# Patient Record
Sex: FEMALE | Race: WHITE | NOT HISPANIC OR LATINO | Employment: OTHER | URBAN - METROPOLITAN AREA
[De-identification: names, ages, dates, MRNs, and addresses within clinical notes are randomized per-mention and may not be internally consistent; named-entity substitution may affect disease eponyms.]

---

## 2017-01-11 ENCOUNTER — PATIENT MESSAGE (OUTPATIENT)
Dept: MEDICAL GROUP | Facility: CLINIC | Age: 62
End: 2017-01-11

## 2017-01-12 ENCOUNTER — PATIENT MESSAGE (OUTPATIENT)
Dept: MEDICAL GROUP | Facility: CLINIC | Age: 62
End: 2017-01-12

## 2017-01-12 DIAGNOSIS — R79.89 ELEVATED TSH: ICD-10-CM

## 2017-01-12 DIAGNOSIS — E78.5 DYSLIPIDEMIA: ICD-10-CM

## 2017-01-12 RX ORDER — LEVOTHYROXINE SODIUM 0.05 MG/1
50 TABLET ORAL
Qty: 90 TAB | Refills: 3 | Status: SHIPPED | OUTPATIENT
Start: 2017-01-12 | End: 2018-03-05 | Stop reason: SDUPTHER

## 2017-01-12 RX ORDER — ATORVASTATIN CALCIUM 20 MG/1
20 TABLET, FILM COATED ORAL
Qty: 90 TAB | Refills: 3 | Status: SHIPPED | OUTPATIENT
Start: 2017-01-12 | End: 2017-12-18 | Stop reason: SDUPTHER

## 2017-01-12 NOTE — TELEPHONE ENCOUNTER
From: Gerri Castelan  To: Natacha Murillo M.D.  Sent: 1/11/2017 6:53 PM PST  Subject: Prescription Question    Hi. I sent a request for a prescription change and refill on October 4th, but haven't heard anything back yet. [facility] and [facility] are requesting that I order refills online instead of picking up at local pharmacies. I have plenty of Levothyroxine, but am running low on atorvastatin. Also, I need an authorization from Dr. Murillo on the atorvastatin.    Thank you.

## 2017-01-13 NOTE — TELEPHONE ENCOUNTER
From: Gerri Castelan  To: Natacha Murillo M.D.  Sent: 1/12/2017 4:50 PM PST  Subject: RE:prescription request    Hi. It is [facility]. They prefer that I use mail-order. Their phone number is [phone]. The contract ID is [id number]. RXBIN: [id number], RXPCN: NetCard, RXGrp: EMELINA.     Would you change my local pharmacy to [facility] [address] because [facility] pharmacy is now [facility] and [facility] uses [facility].     Yes, I need atorvastatin and levothyroxine. I'm buying an over the counter drug (Zantac) for the gerd.     Thanks so much. Both of our prescription plans changed this month.          Gerri  ----- Message -----  From: Natacha Murillo M.D.  Sent: 1/12/2017 8:48 AM PST  To: Gerri Castelan  Subject: prescription request    I did not receive that request. The last one I have for atorvastatin was back in September. I do see in October 14 renewal at [facility] locally. The only pharmacy I have on file for you is local. Wet pharmacy do you wish to use? And I gather I need to send in a prescription on both the atorvastatin and the levothyroxine. Do I also need to send in the ranitidine?

## 2017-01-19 ENCOUNTER — OFFICE VISIT (OUTPATIENT)
Dept: OTHER | Facility: IMAGING CENTER | Age: 62
End: 2017-01-19
Payer: COMMERCIAL

## 2017-01-19 DIAGNOSIS — G43.709 CHRONIC MIGRAINE WITHOUT AURA WITHOUT STATUS MIGRAINOSUS, NOT INTRACTABLE: ICD-10-CM

## 2017-01-19 PROCEDURE — 97813 ACUP 1/> W/ESTIM 1ST 15 MIN: CPT | Performed by: FAMILY MEDICINE

## 2017-01-19 PROCEDURE — 97811 ACUP 1/> W/O ESTIM EA ADD 15: CPT | Performed by: FAMILY MEDICINE

## 2017-01-19 PROCEDURE — 99213 OFFICE O/P EST LOW 20 MIN: CPT | Mod: 25 | Performed by: FAMILY MEDICINE

## 2017-01-19 NOTE — PROGRESS NOTES
[facility]upuncture Progress Note  [address] [facility] [address]  Dept: [phone]      Patient Name: Gerri Castelan   MRN: [id number]  YOB: 1955  PCP: Natacha Murillo M.D.  Date of Service: 1/19/2017  1:29 PM    CC Right shoulder pain, Loose BM with urgency   HPI Patient is a 59 yo  female with chronic loss stool after her GB removal.  She has been also having right shoulder tightness after her procedure of melanoma removal.  She would like her GERD treated as well.  Since last tx she has been feeling no right shoulder tightness.  Her left periorbital headache still occurs a few times with lesser intensity after about 3 weeks of no headache at all.  Her frontal sinus cruz shave some pressure since last night and got a little headache this morning.  Her hypothyroidism is stable but she is still mildly tired.   ROS Birthplace: Not asked.  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   • Dyslipidemia 10/2010   • Carcinoma of breast (CMS-HCC) 1/2011     diagnosed at 55, no chemo required due to oncotype   • gallbladder polyp 1/2011   • S/P bilateral mastectomy 3/2011   • Family history of early CAD 10/17/2016     Past Surgical History   Procedure Laterality Date   • Tubal ligation  1987   • Colonoscopy  2/2005     normal, due 2015   • Other orthopedic surgery  1997     knee surgery ACL repair   • Other  1983     malignant melanoma skin cancer upper back   • Lumpectomy  1/19/2011     Performed by HANNAH LEVIN at SURGERY [facility] DAY [facility] ORS   • Node biopsy sentinel  1/19/2011     Performed by [facility]   • Breast biopsy  2/24/2011     Performed by HANNAH LEVIN at SURGERY [facility] DAY [facility] ORS   • Mastectomy  3/29/2011     Performed by HANNAH LEVIN at SUR[facility]ERY [facility]   • Breast reconstruction  3/29/2011     Performed by PATEL DALTON at SURGERY [facility]   • Tissue expander place/remove  3/29/2011     Performed by PATEL DALTON at SURGERY [facility]   • Breast reconstruction  7/15/2011     Performed by PATEL DALTON at SURGERY [facility]   • Tissue expander place/remove  7/15/2011     Performed by PATEL DALTON at SURGERY [facility]   • Breast implant revision  7/15/2011     Performed by PATEL DALTON at SURGERY [facility]   • Capsulectomy  7/15/2011     Performed by PATEL DALTON at SURGERY [facility]   • Tissue transfer/rearrange  7/15/2011     Performed by PATEL DALTON at [facility]   • Nipple reconstruction  12/1/2011     Performed by PATEL DALTON at [facility]   • Flap graft  12/1/2011     Performed by PATEL DALTON at [facility]   • Tonya by laparoscopy  12/6/2012     Performed by Yessica Ocampo M.D. at SURGERY [facility]      SH Social History     Social History   • Marital Status:      Spouse Name: N/A   • Number of Children: N/A   • Years of Education: N/A     Social History Main Topics   • Smoking status: Never Smoker    • Smokeless tobacco: Never Used   • Alcohol Use: 0.6 oz/week     1 Standard drinks or equivalent per week      Comment: rarely   • Drug Use: No   • Sexual Activity:     Partners: Male     Other Topics Concern   • Not on file     Social History Narrative      MEDS Current Outpatient Prescriptions on File Prior to Visit   Medication Sig Dispense Refill   • levothyroxine (SYNTHROID) 50 MCG Tab Take 1 Tab by mouth every morning before breakfast. 90 Tab 3   • atorvastatin (LIPITOR) 20 MG Tab Take 1 Tab by mouth every bedtime. 90 Tab 3   • ranitidine (ZANTAC) 150 MG Tab Take 150 mg by mouth 2 times a day.     • aspirin EC (ECOTRIN) 81 MG Tablet Delayed Response Take 1 Tab by mouth every day. 30 Tab 0   • Probiotic Product  (PROBIOTIC DAILY PO) Take 1 Cap by mouth every day.       No current facility-administered medications on file prior to visit.      ALLERGIES Allergies   Allergen Reactions   • Arimidex [Anastrozole] Swelling      PE Marguerite Exam: Stomach Qi: (+) pecking radial pulse  (+) Oketsu, (+) Immune,   (L) Adrenal - B/LKid16 St9 DaiMai ASIS Kid2         Assessment Eastern Liver/blood stagnation, Stomach qi imbalance, Immune imbalance, Adrenal exhaustion.    Western Encounter Diagnoses   Name Primary?   • Chronic migraine without aura without status migrainosus, not intractable                   Plan Set 1: Left (Lv4, Lu5), B/L LI10-11 area  Set 2: R Kd3, 7, 9, 10, 27  Set 3: L (LI 4 --> LI 10 TW 5 --> TW 9, SI 7 --> SI 9, SP 6 +++> SP 9, LR 3.3 +++> LR8), R (BOSSMAN 7--> BOSSMAN 5, PC 5--> PC 9, ST 39 --> ST 36, GB 34, Ht 5)  Set 4: R Er pung 1 syd, L Er pung 2 syd, Upper feeling area 2/5     Chema Hopkins D.O.    >More than 15 minutes were spent discussing with the patient about her condition which did not include procedure time.

## 2017-01-19 NOTE — MR AVS SNAPSHOT
Gerri Castelan   2017 1:30 PM   Office Visit   MRN: [id number]    Department:  Acupuncture Med   Dept Phone:  [phone]    Description:  Female : 1955   Provider:  Chema Hopkins D.O.           Allergies as of 2017     Allergen Noted Reactions    Arimidex [Anastrozole] 2013   Swelling      You were diagnosed with     Chronic migraine without aura without status migrainosus, not intractable   [[id number]]         Vital Signs     Last Menstrual Period Smoking Status                10/30/2004 Never Smoker           Basic Information     Date Of Birth Sex Race Ethnicity Preferred Language    1955 Female White Non- English      Problem List              ICD-10-CM Priority Class Noted - Resolved    GERD (gastroesophageal reflux disease) K21.9   2009 - Present    Family history of breast cancer in mother Z80.3   2010 - Present    Personal history of breast cancer Z85.3   Unknown - Present    Melanoma (CMS-HCC) C43.9   2011 - Present    Chronic migraine without aura without status migrainosus, not intractable G43.709   Unknown - Present    Elevated TSH R94.6   3/22/2016 - Present    Chronic right shoulder pain M25.511, G89.29   2016 - Present    Frequent loose stools R19.7   2016 - Present    Dyslipidemia E78.5   2016 - Present    Hypothyroid E03.9   2016 - Present    History of TIA (transient ischemic attack) Z86.73   10/3/2016 - Present    Family history of early CAD Z82.49   10/17/2016 - Present    Abnormal echocardiogram R93.1   2016 - Present    Elevated fasting glucose R73.01   2016 - Present      Health Maintenance        Date Due Completion Dates    PAP SMEAR 3/22/2017 3/22/2016, 2015, 2013, 2012 (Prv Comp), 2012, 2011, 2010    Override on 2012: Previously completed    IMM DTaP/Tdap/Td Vaccine (2 - Td) 2021    COLONOSCOPY 2025            Current Immunizations     Influenza TIV (IM) 11/5/2013    Influenza Vaccine Quad Inj (Pf) 10/15/2015    Influenza Vaccine Quad Inj (Preserved) 10/20/2016    SHINGLES VACCINE 10/15/2015    Tdap Vaccine 8/2/2011      Below and/or attached are the medications your provider expects you to take. Review all of your home medications and newly ordered medications with your provider and/or pharmacist. Follow medication instructions as directed by your provider and/or pharmacist. Please keep your medication list with you and share with your provider. Update the information when medications are discontinued, doses are changed, or new medications (including over-the-counter products) are added; and carry medication information at all times in the event of emergency situations     Allergies:  ARIMIDEX - Swelling               Medications  Valid as of: January 19, 2017 -  2:35 PM    Generic Name Brand Name Tablet Size Instructions for use    Aspirin (Tablet Delayed Response) ECOTRIN 81 MG Take 1 Tab by mouth every day.        Atorvastatin Calcium (Tab) LIPITOR 20 MG Take 1 Tab by mouth every bedtime.        Levothyroxine Sodium (Tab) SYNTHROID 50 MCG Take 1 Tab by mouth every morning before breakfast.        Probiotic Product   Take 1 Cap by mouth every day.        RaNITidine HCl (Tab) ZANTAC 150 MG Take 150 mg by mouth 2 times a day.        .                 Medicines prescribed today were sent to:     [id number] - [place] - [address]    [address] [address]    Phone: [phone] Fax: [phone]    Open 24 Hours?: No    [facility] - [place], [place] - [address]    [address] [address]    Phone: [phone] Fax: [phone]    Open 24 Hours?: No    [id number] - [place] - [address] [facility] [address]    [address] [address]    Phone: [phone] Fax: [phone]    Open 24 Hours?: No      Medication refill instructions:        If your prescription bottle indicates you have medication refills left, it is not necessary to call your provider’s office. Please contact your pharmacy and they will refill your medication.    If your prescription bottle indicates you do not have any refills left, you may request refills at any time through one of the following ways: The online [employer] system (except Urgent Care), by calling your provider’s office, or by asking your pharmacy to contact your provider’s office with a refill request. Medication refills are processed only during regular business hours and may not be available until the next business day. Your provider may request additional information or to have a follow-up visit with you prior to refilling your medication.   *Please Note: Medication refills are assigned a new Rx number when refilled electronically. Your pharmacy may indicate that no refills were authorized even though a new prescription for the same medication is available at the pharmacy. Please request the medicine by name with the pharmacy before contacting your provider for a refill.        Instructions    Have encouraged the patient to rest after the acupuncture session today - taking naps or going to sleep early as necessary.  Increase intake of water and refrain from strenuous activities.  Patient may expect to feel transient worsening of symptoms, but this should resolve to benefit in the next day or two after treatment.    The side effects of Acupuncture needle insertion include: minor bruising, bleeding, or pain at the site of needle insertion.  If more worrisome symptoms, such as continued bleeding, severe bruising, or continue pain or altered sensation persist, please contact [facility] office @ [phone]            [employer] Access Code: Activation code not generated  Current [employer] Status: Active

## 2017-01-19 NOTE — PATIENT INSTRUCTIONS
Have encouraged the patient to rest after the acupuncture session today - taking naps or going to sleep early as necessary.  Increase intake of water and refrain from strenuous activities.  Patient may expect to feel transient worsening of symptoms, but this should resolve to benefit in the next day or two after treatment.    The side effects of Acupuncture needle insertion include: minor bruising, bleeding, or pain at the site of needle insertion.  If more worrisome symptoms, such as continued bleeding, severe bruising, or continue pain or altered sensation persist, please contact [facility] office @ [phone]

## 2017-02-14 ENCOUNTER — OFFICE VISIT (OUTPATIENT)
Dept: OTHER | Facility: IMAGING CENTER | Age: 62
End: 2017-02-14
Payer: COMMERCIAL

## 2017-02-14 DIAGNOSIS — G89.29 CHRONIC RIGHT SHOULDER PAIN: ICD-10-CM

## 2017-02-14 DIAGNOSIS — M25.511 CHRONIC RIGHT SHOULDER PAIN: ICD-10-CM

## 2017-02-14 DIAGNOSIS — G43.709 CHRONIC MIGRAINE WITHOUT AURA WITHOUT STATUS MIGRAINOSUS, NOT INTRACTABLE: ICD-10-CM

## 2017-02-14 PROCEDURE — 99213 OFFICE O/P EST LOW 20 MIN: CPT | Mod: 25 | Performed by: FAMILY MEDICINE

## 2017-02-14 PROCEDURE — 97813 ACUP 1/> W/ESTIM 1ST 15 MIN: CPT | Performed by: FAMILY MEDICINE

## 2017-02-14 PROCEDURE — 97811 ACUP 1/> W/O ESTIM EA ADD 15: CPT | Performed by: FAMILY MEDICINE

## 2017-02-14 NOTE — PATIENT INSTRUCTIONS
Have encouraged the patient to rest after the acupuncture session today - taking naps or going to sleep early as necessary.  Increase intake of water and refrain from strenuous activities.  Patient may expect to feel transient worsening of symptoms, but this should resolve to benefit in the next day or two after treatment.    The side effects of Acupuncture needle insertion include: minor bruising, bleeding, or pain at the site of needle insertion.  If more worrisome symptoms, such as continued bleeding, severe bruising, or continue pain or altered sensation persist, please contact [facility] office @ [phone]

## 2017-02-14 NOTE — PROGRESS NOTES
[facility] Acupuncture Progress Note  [address] [facility] [address]  Dept: [phone]      Patient Name: Gerri Castelan   MRN: [id number]  YOB: 1955  PCP: Natacha Murillo M.D.  Date of Service: 2/14/2017  1:59 PM    CC Right shoulder pain, Loose BM with urgency   HPI Patient is a 61 yo  female with chronic loss stool after her GB removal.  She has been also having right shoulder tightness after her procedure of melanoma removal.  She would like her GERD treated as well.  Since last tx she has been feeling no right shoulder tightness.  Her left periorbital headache still occurs a few times with lesser intensity about 3 weeks ago.  Her frontal sinus no pressure is presented.  Her hypothyroidism is stable but she is still mildly tired.   ROS Birthplace: Not asked.  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   • Dyslipidemia 10/2010   • Carcinoma of breast (CMS-HCC) 1/2011     diagnosed at 55, no chemo required due to oncotype   • gallbladder polyp 1/2011   • S/P bilateral mastectomy 3/2011   • Family history of early CAD 10/17/2016     Past Surgical History   Procedure Laterality Date   • Tubal ligation  1987   • Colonoscopy  2/2005     normal, due 2015   • Other orthopedic surgery  1997     knee surgery ACL repair   • Other  1983     malignant melanoma skin cancer upper back   • Lumpectomy  1/19/2011     Performed by HANNAH LEVIN at SURGERY SAME DAY [facility] ORS   • Node biopsy sentinel  1/19/2011     Performed by HANNAH LEVIN at SURGERY SAME DAY [facility] OR[facility]   • Breast biopsy  2/24/2011     Performed by HANNAH LEVIN at SURGERY SAME DAY [facility] ORS   • Mastectomy  3/29/2011     Performed by HANNAH LEVIN at SURGERY [facility]   • Breast reconstruction  3/29/2011     Performed by PATEL DALTON  SINA at SURGERY [facility]   • Tissue expander place/remove  3/29/2011     Performed by PATEL DALTON at SURGERY [facility]   • Breast reconstruction  7/15/2011     Performed by PATEL DALTON at SURGERY [facility]   • Tissue expander place/remove  7/15/2011     Performed by PATEL DALTON at [facility]   • Breast implant revision  7/15/2011     Performed by PATEL DALTON at [facility]   • Capsulectomy  7/15/2011     Performed by PATEL DALTON at [facility]   • Tissue transfer/rearrange  7/15/2011     Performed by PATEL DALTON at [facility]   • Nipple reconstruction  12/1/2011     Performed by PATEL DALTON at [facility]   • Flap graft  12/1/2011     Performed by PATEL DALTON at [facility]   • Tonya by laparoscopy  12/6/2012     Performed by Yessica Ocampo M.D. at SURGERY [facility]      SH Social History     Social History   • Marital Status:      Spouse Name: N/A   • Number of Children: N/A   • Years of Education: N/A     Social History Main Topics   • Smoking status: Never Smoker    • Smokeless tobacco: Never Used   • Alcohol Use: 0.6 oz/week     1 Standard drinks or equivalent per week      Comment: rarely   • Drug Use: No   • Sexual Activity:     Partners: Male     Other Topics Concern   • Not on file     Social History Narrative      MEDS Current Outpatient Prescriptions on File Prior to Visit   Medication Sig Dispense Refill   • levothyroxine (SYNTHROID) 50 MCG Tab Take 1 Tab by mouth every morning before breakfast. 90 Tab 3   • atorvastatin (LIPITOR) 20 MG Tab Take 1 Tab by mouth every bedtime. 90 Tab 3   • ranitidine (ZANTAC) 150 MG Tab Take 150 mg by mouth 2 times a day.     • aspirin EC (ECOTRIN) 81 MG Tablet Delayed Response Take 1 Tab by mouth every day. 30 Tab 0   • Probiotic Product (PROBIOTIC DAILY PO) Take 1 Cap by mouth every day.       No current  facility-administered medications on file prior to visit.      ALLERGIES Allergies   Allergen Reactions   • Arimidex [Anastrozole] Swelling      PE Marguerite Exam: Stomach Qi: (+) pecking radial pulse  (+) Oketsu, (+) Immune,   (L) Adrenal - B/LKid16 St9 DaiMai ASIS Kid2         Assessment Eastern Liver/blood stagnation, Stomach qi imbalance, Immune imbalance, Adrenal exhaustion.    Western Encounter Diagnoses   Name Primary?   • Chronic migraine without aura without status migrainosus, not intractable    • Chronic right shoulder pain                   Plan Set 1: Left (Lv4, Lu5), B/L LI10-11 area  Set 2: R Kd3, 7, 9, 10, 27  Set 3: L (LI 4 --> LI 10 TW 5, SI 7 --> SI 9, SP 6 +++> SP 9, LR 3.3 +++> LR8), R (BOSSMAN 7--> BOSSMAN 5, PC 5, ST 39 --> ST 36, GB 34, Ht 5)  Set 4: R Er pung 1 syd, L Er pung 2 syd, Upper feeling area 2/5   3 times more and then graduate    Chema Hopkins D.O.    >More than 15 minutes were spent discussing with the patient about her condition which did not include procedure time.

## 2017-02-14 NOTE — MR AVS SNAPSHOT
Gerri Castelan   2017 2:00 PM   Office Visit   MRN: [id number]    Department:  Acupuncture Med   Dept Phone:  [phone]    Description:  Female : 1955   Provider:  Chema Hopkins D.O.           Allergies as of 2017     Allergen Noted Reactions    Arimidex [Anastrozole] 2013   Swelling      You were diagnosed with     Chronic migraine without aura without status migrainosus, not intractable   [014955]       Chronic right shoulder pain   [914820]         Vital Signs     Last Menstrual Period Smoking Status                10/30/2004 Never Smoker           Basic Information     Date Of Birth Sex Race Ethnicity Preferred Language    1955 Female White Non- English      Your appointments     Mar 14, 2017  1:30 PM   ACUPUNCTURE 30 with [facility] (--)    [address]  [employer] [place] [address]   [phone]            2017  2:00 PM   [facility] (--)    [address]  [email or web address] [address]   [phone]              Problem List              ICD-10-CM Priority Class Noted - Resolved    GERD (gastroesophageal reflux disease) K21.9   2009 - Present    Family history of breast cancer in mother Z80.3   2010 - Present    Personal history of breast cancer Z85.3   Unknown - Present    Melanoma (CMS-HCC) C43.9   2011 - Present    Chronic migraine without aura without status migrainosus, not intractable G43.709   Unknown - Present    Elevated TSH R94.6   3/22/2016 - Present    Chronic right shoulder pain M25.511, G89.29   2016 - Present    Frequent loose stools R19.7   2016 - Present    Dyslipidemia E78.5   2016 - Present    Hypothyroid E03.9   2016 - Present    History of TIA (transient ischemic attack) Z86.73   10/3/2016 - Present    Family history of early CAD Z82.49   10/17/2016 -  Present    Abnormal echocardiogram R93.1   11/17/2016 - Present    Elevated fasting glucose R73.01   12/30/2016 - Present      Health Maintenance        Date Due Completion Dates    PAP SMEAR 3/22/2017 3/22/2016, 1/12/2015, 11/5/2013, 9/20/2012 (Prv Comp), 9/20/2012, 8/30/2011, 8/2/2010    Override on 9/20/2012: Previously completed    IMM DTaP/Tdap/Td Vaccine (2 - Td) 8/2/2021 8/2/2011    COLONOSCOPY 2/24/2025 2/24/2015            Current Immunizations     Influenza TIV (IM) 11/5/2013    Influenza Vaccine Quad Inj (Pf) 10/15/2015    Influenza Vaccine Quad Inj (Preserved) 10/20/2016    SHINGLES VACCINE 10/15/2015    Tdap Vaccine 8/2/2011      Below and/or attached are the medications your provider expects you to take. Review all of your home medications and newly ordered medications with your provider and/or pharmacist. Follow medication instructions as directed by your provider and/or pharmacist. Please keep your medication list with you and share with your provider. Update the information when medications are discontinued, doses are changed, or new medications (including over-the-counter products) are added; and carry medication information at all times in the event of emergency situations     Allergies:  ARIMIDEX - Swelling               Medications  Valid as of: February 14, 2017 -  2:53 PM    Generic Name Brand Name Tablet Size Instructions for use    Aspirin (Tablet Delayed Response) ECOTRIN 81 MG Take 1 Tab by mouth every day.        Atorvastatin Calcium (Tab) LIPITOR 20 MG Take 1 Tab by mouth every bedtime.        Levothyroxine Sodium (Tab) SYNTHROID 50 MCG Take 1 Tab by mouth every morning before breakfast.        Probiotic Product   Take 1 Cap by mouth every day.        RaNITidine HCl (Tab) ZANTAC 150 MG Take 150 mg by mouth 2 times a day.        .                 Medicines prescribed today were sent to:     [facility]/PHARMACY #[id number] - [place], [place] - [address]    [address] [place] [address]    Phone:  [phone] Fax: [phone]    Open 24 Hours?: No    [facility] - [place], [place] - [address]    [address] [facility] [address]    Phone: [phone] Fax: [phone]    Open 24 Hours?: No    [id number] - [place], [place] - [address] AT [facility] [address]    [address] [place] [address]    Phone: [phone] Fax: [phone]    Open 24 Hours?: No      Medication refill instructions:       If your prescription bottle indicates you have medication refills left, it is not necessary to call your provider’s office. Please contact your pharmacy and they will refill your medication.    If your prescription bottle indicates you do not have any refills left, you may request refills at any time through one of the following ways: The online [employer] system (except Urgent Care), by calling your provider’s office, or by asking your pharmacy to contact your provider’s office with a refill request. Medication refills are processed only during regular business hours and may not be available until the next business day. Your provider may request additional information or to have a follow-up visit with you prior to refilling your medication.   *Please Note: Medication refills are assigned a new Rx number when refilled electronically. Your pharmacy may indicate that no refills were authorized even though a new prescription for the same medication is available at the pharmacy. Please request the medicine by name with the pharmacy before contacting your provider for a refill.        Instructions    Have encouraged the patient to rest after the acupuncture session today - taking naps or going to sleep early as necessary.  Increase intake of water and refrain from strenuous activities.  Patient may expect to feel transient worsening of symptoms, but this should resolve to benefit in the next day or two after treatment.    The side effects of Acupuncture  needle insertion include: minor bruising, bleeding, or pain at the site of needle insertion.  If more worrisome symptoms, such as continued bleeding, severe bruising, or continue pain or altered sensation persist, please contact [facility] office @ [phone]            [employer] Access Code: Activation code not generated  Current [employer] Status: Active

## 2017-03-14 ENCOUNTER — OFFICE VISIT (OUTPATIENT)
Dept: OTHER | Facility: IMAGING CENTER | Age: 62
End: 2017-03-14
Payer: COMMERCIAL

## 2017-03-14 DIAGNOSIS — G43.709 CHRONIC MIGRAINE WITHOUT AURA WITHOUT STATUS MIGRAINOSUS, NOT INTRACTABLE: ICD-10-CM

## 2017-03-14 DIAGNOSIS — G89.29 CHRONIC RIGHT SHOULDER PAIN: ICD-10-CM

## 2017-03-14 DIAGNOSIS — M25.511 CHRONIC RIGHT SHOULDER PAIN: ICD-10-CM

## 2017-03-14 PROCEDURE — 99213 OFFICE O/P EST LOW 20 MIN: CPT | Mod: 25 | Performed by: FAMILY MEDICINE

## 2017-03-14 PROCEDURE — 97811 ACUP 1/> W/O ESTIM EA ADD 15: CPT | Performed by: FAMILY MEDICINE

## 2017-03-14 PROCEDURE — 97813 ACUP 1/> W/ESTIM 1ST 15 MIN: CPT | Performed by: FAMILY MEDICINE

## 2017-03-14 NOTE — MR AVS SNAPSHOT
Gerri Castelan   3/14/2017 1:30 PM   Office Visit   MRN: [id number]    Department:  Acupuncture Med   Dept Phone:  [phone]    Description:  Female : 1955   Provider:  Chema Hopkins D.O.           Allergies as of 3/14/2017     Allergen Noted Reactions    Arimidex [Anastrozole] 2013   Swelling      You were diagnosed with     Chronic migraine without aura without status migrainosus, not intractable   [[id number]]         Vital Signs     Last Menstrual Period Smoking Status                10/30/2004 Never Smoker           Basic Information     Date Of Birth Sex Race Ethnicity Preferred Language    1955 Female White Non- English      Your appointments     2017  2:00 PM   ACUPUNCTURE 30 with [facility] (--)    [address]  [place] [address]   [phone]            May 09, 2017  2:00 PM   ACUPUNCTURE 30 with [facility] (--)    [address]  [place] [address]   [phone]              Problem List              ICD-10-CM Priority Class Noted - Resolved    GERD (gastroesophageal reflux disease) K21.9   2009 - Present    Family history of breast cancer in mother Z80.3   2010 - Present    Personal history of breast cancer Z85.3   Unknown - Present    Melanoma (CMS-HCC) C43.9   2011 - Present    Chronic migraine without aura without status migrainosus, not intractable G43.709   Unknown - Present    Elevated TSH R94.6   3/22/2016 - Present    Chronic right shoulder pain M25.511, G89.29   2016 - Present    Frequent loose stools R19.7   2016 - Present    Dyslipidemia E78.5   2016 - Present    Hypothyroid E03.9   2016 - Present    History of TIA (transient ischemic attack) Z86.73   10/3/2016 - Present    Family history of early CAD Z82.49   10/17/2016 - Present    Abnormal echocardiogram R93.1    11/17/2016 - Present    Elevated fasting glucose R73.01   12/30/2016 - Present      Health Maintenance        Date Due Completion Dates    PAP SMEAR 3/22/2017 3/22/2016, 1/12/2015, 11/5/2013, 9/20/2012 (Prv Comp), 9/20/2012, 8/30/2011, 8/2/2010    Override on 9/20/2012: Previously completed    IMM DTaP/Tdap/Td Vaccine (2 - Td) 8/2/2021 8/2/2011    COLONOSCOPY 2/24/2025 2/24/2015            Current Immunizations     Influenza TIV (IM) 11/5/2013    Influenza Vaccine Quad Inj (Pf) 10/15/2015    Influenza Vaccine Quad Inj (Preserved) 10/20/2016    SHINGLES VACCINE 10/15/2015    Tdap Vaccine 8/2/2011      Below and/or attached are the medications your provider expects you to take. Review all of your home medications and newly ordered medications with your provider and/or pharmacist. Follow medication instructions as directed by your provider and/or pharmacist. Please keep your medication list with you and share with your provider. Update the information when medications are discontinued, doses are changed, or new medications (including over-the-counter products) are added; and carry medication information at all times in the event of emergency situations     Allergies:  ARIMIDEX - Swelling               Medications  Valid as of: March 14, 2017 -  2:52 PM    Generic Name Brand Name Tablet Size Instructions for use    Aspirin (Tablet Delayed Response) ECOTRIN 81 MG Take 1 Tab by mouth every day.        Atorvastatin Calcium (Tab) LIPITOR 20 MG Take 1 Tab by mouth every bedtime.        Levothyroxine Sodium (Tab) SYNTHROID 50 MCG Take 1 Tab by mouth every morning before breakfast.        Probiotic Product   Take 1 Cap by mouth every day.        RaNITidine HCl (Tab) ZANTAC 150 MG Take 150 mg by mouth 2 times a day.        .                 Medicines prescribed today were sent to:     [facility]/PHARMACY #[id number] - [place] - [address]    [address] [place] [address]    Phone: [phone] Fax: [phone]    Open 24 Hours?:  No    [facility] - [place], [place] - [address]    [address] [facility] [address] [facility] [address]    Phone: [phone] Fax: [phone]    Open 24 Hours?: No    [facility] - [place], [place] - [address] [facility] [address] [facility] [address]    [address] [place] [address]    Phone: [phone] Fax: [phone]    Open 24 Hours?: No      Medication refill instructions:       If your prescription bottle indicates you have medication refills left, it is not necessary to call your provider’s office. Please contact your pharmacy and they will refill your medication.    If your prescription bottle indicates you do not have any refills left, you may request refills at any time through one of the following ways: The online [employer] system (except Urgent Care), by calling your provider’s office, or by asking your pharmacy to contact your provider’s office with a refill request. Medication refills are processed only during regular business hours and may not be available until the next business day. Your provider may request additional information or to have a follow-up visit with you prior to refilling your medication.   *Please Note: Medication refills are assigned a new Rx number when refilled electronically. Your pharmacy may indicate that no refills were authorized even though a new prescription for the same medication is available at the pharmacy. Please request the medicine by name with the pharmacy before contacting your provider for a refill.           [employer] Access Code: Activation code not generated  Current [employer] Status: Active

## 2017-03-14 NOTE — PROGRESS NOTES
[facility] Acupuncture Progress Note  [address] [facility] [address] [address]  Dept: [phone]      Patient Name: Gerri Castelan   MRN: [id number]  YOB: 1955  PCP: Natacha Murillo M.D.  Date of Service: 3/14/2017  1:46 PM    CC Right shoulder pain, Loose BM with urgency   HPI Patient is a 59 yo  female with chronic loss stool after her GB removal.  She has been also having right shoulder tightness after her procedure of melanoma removal.  She would like her GERD treated as well.  Since last tx she has been feeling no right shoulder tightness.  Her left periorbital headache still occurs a few times with lesser intensity about 2 weeks ago.  Her frontal sinus has a lot of pressure at this time.  Her hypothyroidism is stable but she is still mildly tired.   ROS Birthplace: Not asked.  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   • Dyslipidemia 10/2010   • Carcinoma of breast (CMS-HCC) 1/2011     diagnosed at 55, no chemo required due to oncotype   • gallbladder polyp 1/2011   • S/P bilateral mastectomy 3/2011   • Family history of early CAD 10/17/2016     Past Surgical History   Procedure Laterality Date   • Tubal ligation  1987   • Colonoscopy  2/2005     normal, due 2015   • Other orthopedic surgery  1997     knee surgery ACL repair   • Other  1983     malignant melanoma skin cancer upper back   • Lumpectomy  1/19/2011     Performed by HANNAH LEVIN at SURGERY SAME DAY [facility]   • Node biopsy sentinel  1/19/2011     Performed by HANNAH LEVIN at SURGERY SAME DAY [facility]   • Breast biopsy  2/24/2011     Performed by HANNAH LEVIN at SURGERY SAME DAY [facility]   • Mastectomy  3/29/2011     Performed by HANNAH LEVIN at SURGERY [facility] ORS   • Breast reconstruction  3/29/2011     Performed by  PATEL DALTON at SURGERY [facility]   • Tissue expander place/remove  3/29/2011     Performed by PATEL DALTON at SURGERY [facility]   • Breast reconstruction  7/15/2011     Performed by PATEL DALTON at [facility]   • Tissue expander place/remove  7/15/2011     Performed by PATEL DALTON at [facility]   • Breast implant revision  7/15/2011     Performed by PATEL DALTON at [facility]   • Capsulectomy  7/15/2011     Performed by PATEL DALTON at [facility]   • Tissue transfer/rearrange  7/15/2011     Performed by PATEL DALTON at [facility]   • Nipple reconstruction  12/1/2011     Performed by PATEL DALTON at [facility]   • Flap graft  12/1/2011     Performed by PATEL DALTON at [facility]   • Tonya by laparoscopy  12/6/2012     Performed by Yessica Ocampo M.D. at SURGERY [facility]      SH Social History     Social History   • Marital Status:      Spouse Name: N/A   • Number of Children: N/A   • Years of Education: N/A     Social History Main Topics   • Smoking status: Never Smoker    • Smokeless tobacco: Never Used   • Alcohol Use: 0.6 oz/week     1 Standard drinks or equivalent per week      Comment: rarely   • Drug Use: No   • Sexual Activity:     Partners: Male     Other Topics Concern   • Not on file     Social History Narrative      MEDS Current Outpatient Prescriptions on File Prior to Visit   Medication Sig Dispense Refill   • levothyroxine (SYNTHROID) 50 MCG Tab Take 1 Tab by mouth every morning before breakfast. 90 Tab 3   • atorvastatin (LIPITOR) 20 MG Tab Take 1 Tab by mouth every bedtime. 90 Tab 3   • ranitidine (ZANTAC) 150 MG Tab Take 150 mg by mouth 2 times a day.     • aspirin EC (ECOTRIN) 81 MG Tablet Delayed Response Take 1 Tab by mouth every day. 30 Tab 0   • Probiotic Product (PROBIOTIC DAILY PO) Take 1 Cap by mouth every day.       No  current facility-administered medications on file prior to visit.      ALLERGIES Allergies   Allergen Reactions   • Arimidex [Anastrozole] Swelling      PE Marguerite Exam: Stomach Qi: (+) pecking radial pulse  (+) Oketsu, (+) Immune,   (L) Adrenal - B/LKid16 St9 DaiMai ASIS Kid2         Assessment Eastern Liver/blood stagnation, Stomach qi imbalance, Immune imbalance, Adrenal exhaustion.    Western Encounter Diagnoses   Name Primary?   • Chronic migraine without aura without status migrainosus, not intractable    • Chronic right shoulder pain                   Plan Set 1: Left (Lv4, Lu5), B/L LI10-11 area  Set 2: R Kd3, 7, 9, 10, 27  Set 3: L (LI 4 --> LI 10 TW 5, SI 7 --> SI 9, SP 6 +++> SP 9, LR 3.3 +++> LR8), R (BOSSMAN 7--> BOSSMAN 5, PC 5, ST 39 --> ST 36, GB 34, Ht 5)  Set 4: R Er pung 1 syd, L Er pung 2 syd, Upper feeling area 2/5   2 times more and then graduate    Chema Hopkins D.O.    >More than 15 minutes were spent discussing with the patient about her condition which did not include procedure time.

## 2017-03-14 NOTE — PATIENT INSTRUCTIONS
Have encouraged the patient to rest after the acupuncture session today - taking naps or going to sleep early as necessary.  Increase intake of water and refrain from strenuous activities.  Patient may expect to feel transient worsening of symptoms, but this should resolve to benefit in the next day or two after treatment.    The side effects of Acupuncture needle insertion include: minor bruising, bleeding, or pain at the site of needle insertion.  If more worrisome symptoms, such as continued bleeding, severe bruising, or continue pain or altered sensation persist, please contact [facility] office @ [phone]

## 2017-04-03 ENCOUNTER — PATIENT MESSAGE (OUTPATIENT)
Dept: MEDICAL GROUP | Facility: CLINIC | Age: 62
End: 2017-04-03

## 2017-04-05 ENCOUNTER — HOSPITAL ENCOUNTER (OUTPATIENT)
Dept: LAB | Facility: MEDICAL CENTER | Age: 62
End: 2017-04-05
Attending: FAMILY MEDICINE
Payer: COMMERCIAL

## 2017-04-05 DIAGNOSIS — R73.01 ELEVATED FASTING GLUCOSE: ICD-10-CM

## 2017-04-05 LAB
ANION GAP SERPL CALC-SCNC: 8 MMOL/L (ref 0–11.9)
BUN SERPL-MCNC: 21 MG/DL (ref 8–22)
CALCIUM SERPL-MCNC: 9.4 MG/DL (ref 8.5–10.5)
CHLORIDE SERPL-SCNC: 108 MMOL/L (ref 96–112)
CO2 SERPL-SCNC: 23 MMOL/L (ref 20–33)
CREAT SERPL-MCNC: 0.76 MG/DL (ref 0.5–1.4)
EST. AVERAGE GLUCOSE BLD GHB EST-MCNC: 111 MG/DL
GFR SERPL CREATININE-BSD FRML MDRD: >60 ML/MIN/1.73 M 2
GLUCOSE SERPL-MCNC: 98 MG/DL (ref 65–99)
HBA1C MFR BLD: 5.5 % (ref 0–5.6)
POTASSIUM SERPL-SCNC: 4 MMOL/L (ref 3.6–5.5)
SODIUM SERPL-SCNC: 139 MMOL/L (ref 135–145)

## 2017-04-05 PROCEDURE — 36415 COLL VENOUS BLD VENIPUNCTURE: CPT

## 2017-04-05 PROCEDURE — 80048 BASIC METABOLIC PNL TOTAL CA: CPT

## 2017-04-05 PROCEDURE — 83036 HEMOGLOBIN GLYCOSYLATED A1C: CPT

## 2017-04-07 ENCOUNTER — TELEPHONE (OUTPATIENT)
Dept: MEDICAL GROUP | Facility: CLINIC | Age: 62
End: 2017-04-07

## 2017-04-07 NOTE — TELEPHONE ENCOUNTER
----- Message from Natacha Murillo M.D. sent at 4/6/2017  6:26 PM PDT -----  Your blood salts, blood sugar and kidney function are normal.. Your blood sugar control is normal.

## 2017-04-11 ENCOUNTER — OFFICE VISIT (OUTPATIENT)
Dept: OTHER | Facility: IMAGING CENTER | Age: 62
End: 2017-04-11
Payer: COMMERCIAL

## 2017-04-11 DIAGNOSIS — M25.511 CHRONIC RIGHT SHOULDER PAIN: ICD-10-CM

## 2017-04-11 DIAGNOSIS — G89.29 CHRONIC RIGHT SHOULDER PAIN: ICD-10-CM

## 2017-04-11 DIAGNOSIS — G43.709 CHRONIC MIGRAINE WITHOUT AURA WITHOUT STATUS MIGRAINOSUS, NOT INTRACTABLE: ICD-10-CM

## 2017-04-11 PROCEDURE — 97813 ACUP 1/> W/ESTIM 1ST 15 MIN: CPT | Performed by: FAMILY MEDICINE

## 2017-04-11 PROCEDURE — 99213 OFFICE O/P EST LOW 20 MIN: CPT | Mod: 25 | Performed by: FAMILY MEDICINE

## 2017-04-11 PROCEDURE — 97811 ACUP 1/> W/O ESTIM EA ADD 15: CPT | Performed by: FAMILY MEDICINE

## 2017-04-11 NOTE — PATIENT INSTRUCTIONS
Have encouraged the patient to rest after the acupuncture session today - taking naps or going to sleep early as necessary.  Increase intake of water and refrain from strenuous activities.  Patient may expect to feel transient worsening of symptoms, but this should resolve to benefit in the next day or two after treatment.    The side effects of Acupuncture needle insertion include: minor bruising, bleeding, or pain at the site of needle insertion.  If more worrisome symptoms, such as continued bleeding, severe bruising, or continue pain or altered sensation persist, please contact [facility] office @ [phone]

## 2017-04-11 NOTE — MR AVS SNAPSHOT
Gerri Castelan   2017 2:00 PM   Office Visit   MRN: [id number]    Department:  Acupuncture Med   Dept Phone:  [phone]    Description:  Female : 1955   Provider:  Chema [place] D.O.           Allergies as of 2017     Allergen Noted Reactions    Arimidex [Anastrozole] 2013   Swelling      You were diagnosed with     Chronic migraine without aura without status migrainosus, not intractable   [544789]       Chronic right shoulder pain   [466300]         Vital Signs     Last Menstrual Period Smoking Status                10/30/2004 Never Smoker           Basic Information     Date Of Birth Sex Race Ethnicity Preferred Language    1955 Female White Non- English      Your appointments     2017 11:00 AM   ANNUAL EXAM PREVENTATIVE with Natacha Murillo M.D.   [address]  [place] [address]   [phone]            May 09, 2017  2:00 PM   [facility] (--[facility]    [address]  [place] [address]   [phone]            2017  2:00 PM   ACUPUNCTURE 30 with Chema [place] D.O.   [facility]uncture [facility] (--)    [address]  [place] [address]   [phone]              Problem List              ICD-10-CM Priority Class Noted - Resolved    GERD (gastroesophageal reflux disease) K21.9   2009 - Present    Family history of breast cancer in mother Z80.3   2010 - Present    Personal history of breast cancer Z85.3   Unknown - Present    Melanoma (CMS-HCC) C43.9   2011 - Present    Chronic migraine without aura without status migrainosus, not intractable G43.709   Unknown - Present    Elevated TSH R94.6   3/22/2016 - Present    Chronic right shoulder pain M25.511, G89.29   2016 - Present    Frequent loose stools R19.7   2016 - Present    Dyslipidemia E78.5   2016  - Present    Hypothyroid E03.9   9/26/2016 - Present    History of TIA (transient ischemic attack) Z86.73   10/3/2016 - Present    Family history of early CAD Z82.49   10/17/2016 - Present    Abnormal echocardiogram R93.1   11/17/2016 - Present    Elevated fasting glucose R73.01   12/30/2016 - Present      Health Maintenance        Date Due Completion Dates    PAP SMEAR 3/22/2017 3/22/2016, 1/12/2015, 11/5/2013, 9/20/2012 (Prv Comp), 9/20/2012, 8/30/2011, 8/2/2010    Override on 9/20/2012: Previously completed    IMM DTaP/Tdap/Td Vaccine (2 - Td) 8/2/2021 8/2/2011    COLONOSCOPY 2/24/2025 2/24/2015            Current Immunizations     Influenza TIV (IM) 11/5/2013    Influenza Vaccine Quad Inj (Pf) 10/15/2015    Influenza Vaccine Quad Inj (Preserved) 10/20/2016    SHINGLES VACCINE 10/15/2015    Tdap Vaccine 8/2/2011      Below and/or attached are the medications your provider expects you to take. Review all of your home medications and newly ordered medications with your provider and/or pharmacist. Follow medication instructions as directed by your provider and/or pharmacist. Please keep your medication list with you and share with your provider. Update the information when medications are discontinued, doses are changed, or new medications (including over-the-counter products) are added; and carry medication information at all times in the event of emergency situations     Allergies:  ARIMIDEX - Swelling               Medications  Valid as of: April 11, 2017 -  3:10 PM    Generic Name Brand Name Tablet Size Instructions for use    Aspirin (Tablet Delayed Response) ECOTRIN 81 MG Take 1 Tab by mouth every day.        Atorvastatin Calcium (Tab) LIPITOR 20 MG Take 1 Tab by mouth every bedtime.        Levothyroxine Sodium (Tab) SYNTHROID 50 MCG Take 1 Tab by mouth every morning before breakfast.        Probiotic Product   Take 1 Cap by mouth every day.        RaNITidine HCl (Tab) ZANTAC 150 MG Take 150 mg by mouth 2  times a day.        .                 Medicines prescribed today were sent to:     [id number] - [place], [place] - [address]    [address] [place] [address]    Phone: [phone] Fax: [phone]    Open 24 Hours?: No    [facility] - [place], [place] - [address]    [address] [facility] [address]    Phone: [phone] Fax: [phone]    Open 24 Hours?: No    [id number] - [place], [place] - [address] [facility] [address]    [address] [place] [address]    Phone: [phone] Fax: [phone]    Open 24 Hours?: No      Medication refill instructions:       If your prescription bottle indicates you have medication refills left, it is not necessary to call your provider’s office. Please contact your pharmacy and they will refill your medication.    If your prescription bottle indicates you do not have any refills left, you may request refills at any time through one of the following ways: The online [employer] system (except Urgent Care), by calling your provider’s office, or by asking your pharmacy to contact your provider’s office with a refill request. Medication refills are processed only during regular business hours and may not be available until the next business day. Your provider may request additional information or to have a follow-up visit with you prior to refilling your medication.   *Please Note: Medication refills are assigned a new Rx number when refilled electronically. Your pharmacy may indicate that no refills were authorized even though a new prescription for the same medication is available at the pharmacy. Please request the medicine by name with the pharmacy before contacting your provider for a refill.        Instructions    Have encouraged the patient to rest after the acupuncture session today - taking naps or going to sleep early as necessary.  Increase intake of water and refrain from strenuous  activities.  Patient may expect to feel transient worsening of symptoms, but this should resolve to benefit in the next day or two after treatment.    The side effects of Acupuncture needle insertion include: minor bruising, bleeding, or pain at the site of needle insertion.  If more worrisome symptoms, such as continued bleeding, severe bruising, or continue pain or altered sensation persist, please contact [facility] office @ [phone]            [email or web address] Access Code: Activation code not generated  Current [email or web address] Status: Active

## 2017-04-11 NOTE — PROGRESS NOTES
[facility] Acupuncture Progress Note  [address] [facility] [address]  Dept: [phone]      Patient Name: Gerri Castelan   MRN: [id number]  YOB: 1955  PCP: Natacha Murillo M.D.  Date of Service: 4/11/2017  2:12 PM    CC Right shoulder pain, Loose BM with urgency   HPI Patient is a 59 yo  female with chronic loss stool after her GB removal.  She has been also having right shoulder tightness after her procedure of melanoma removal.  She would like her GERD treated as well.  Since last tx she has been feeling mild right shoulder tightness.  Her left periorbital headache still occurs a few times with lesser intensity a few days ago but not before that.  Her frontal sinus has lessor pressure compared to the last time.  Her hypothyroidism is stable but she is still mildly tired.   ROS Birthplace: Not asked.  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   • Dyslipidemia 10/2010   • Carcinoma of breast (CMS-HCC) 1/2011     diagnosed at 55, no chemo required due to oncotype   • gallbladder polyp 1/2011   • S/P bilateral mastectomy 3/2011   • Family history of early CAD 10/17/2016     Past Surgical History   Procedure Laterality Date   • Tubal ligation  1987   • Colonoscopy  2/2005     normal, due 2015   • Other orthopedic surgery  1997     knee surgery ACL repair   • Other  1983     malignant melanoma skin cancer upper back   • Lumpectomy  1/19/2011     Performed by HANNAH LEVIN at SURGERY SAME DAY [facility] ORS   • Node biopsy sentinel  1/19/2011     Performed by HANNAH LEVIN at SURGERY SAME DAY [facility] ORS   • Breast biopsy  2/24/2011     Performed by HANNAH LEVIN at SURGERY SAME DAY [facility] ORS   • Mastectomy  3/29/2011     Performed by HANNAH LEVIN at SURGERY [facility] ORS   • Breast reconstruction   3/29/2011     Performed by PATEL DALTON at SURGERY [facility]   • Tissue expander place/remove  3/29/2011     Performed by PATEL DALTON at SURGERY [facility]   • Breast reconstruction  7/15/2011     Performed by PATEL DALTON at [facility]   • Tissue expander place/remove  7/15/2011     Performed by PATEL DALTON at [facility]   • Breast implant revision  7/15/2011     Performed by PATEL DALTON at [facility]   • Capsulectomy  7/15/2011     Performed by PATEL DALTON at [facility]   • Tissue transfer/rearrange  7/15/2011     Performed by PATEL DALTON at [facility]   • Nipple reconstruction  12/1/2011     Performed by PATEL DALTON at [facility]   • Flap graft  12/1/2011     Performed by PATEL DALTON at [facility]   • Tonya by laparoscopy  12/6/2012     Performed by Yessica Ocampo M.D. at SURGERY [facility]      SH Social History     Social History   • Marital Status:      Spouse Name: N/A   • Number of Children: N/A   • Years of Education: N/A     Social History Main Topics   • Smoking status: Never Smoker    • Smokeless tobacco: Never Used   • Alcohol Use: 0.6 oz/week     1 Standard drinks or equivalent per week      Comment: rarely   • Drug Use: No   • Sexual Activity:     Partners: Male     Other Topics Concern   • Not on file     Social History Narrative      MEDS Current Outpatient Prescriptions on File Prior to Visit   Medication Sig Dispense Refill   • levothyroxine (SYNTHROID) 50 MCG Tab Take 1 Tab by mouth every morning before breakfast. 90 Tab 3   • atorvastatin (LIPITOR) 20 MG Tab Take 1 Tab by mouth every bedtime. 90 Tab 3   • ranitidine (ZANTAC) 150 MG Tab Take 150 mg by mouth 2 times a day.     • aspirin EC (ECOTRIN) 81 MG Tablet Delayed Response Take 1 Tab by mouth every day. 30 Tab 0   • Probiotic Product (PROBIOTIC DAILY PO) Take 1 Cap by mouth  every day.       No current facility-administered medications on file prior to visit.      ALLERGIES Allergies   Allergen Reactions   • Arimidex [Anastrozole] Swelling      PE Marguerite Exam: Stomach Qi: (+) pecking radial pulse  (+) Oketsu, (+) Immune,   (L) Adrenal - B/LKid16 St9 DaiMai ASIS Kid2         Assessment Eastern Liver/blood stagnation, Stomach qi imbalance, Immune imbalance, Adrenal exhaustion.    Western Encounter Diagnoses   Name Primary?   • Chronic migraine without aura without status migrainosus, not intractable    • Chronic right shoulder pain                   Plan Set 1: Left (Lv4, Lu5), B/L LI10-11 area  Set 2: R Kd3, 7, 9, 10, 27  Set 3: L (LI 4 --> LI 10 TW 5, SI 7 --> SI 9, SP 6 ++> SP 9, LR 3.3, KD 7 ++> KD 10), R (BOSSMAN 7--> BOSSMAN 5, PC 5, ST 39 --> ST 36, GB 34, BL 59 --> BL 40, Ht 5)  Set 4: R Er pung 1 syd, L Er pung 2 syd, Upper feeling area 2/5   2 times treatment and then graduate    Chema [place] D.O.    >More than 15 minutes were spent discussing with the patient about her condition which did not include procedure time. Total acupuncture treatment time = 45 minutes

## 2017-04-13 ENCOUNTER — OFFICE VISIT (OUTPATIENT)
Dept: MEDICAL GROUP | Facility: CLINIC | Age: 62
End: 2017-04-13
Payer: COMMERCIAL

## 2017-04-13 ENCOUNTER — HOSPITAL ENCOUNTER (OUTPATIENT)
Facility: MEDICAL CENTER | Age: 62
End: 2017-04-13
Attending: FAMILY MEDICINE
Payer: COMMERCIAL

## 2017-04-13 VITALS
SYSTOLIC BLOOD PRESSURE: 120 MMHG | HEART RATE: 82 BPM | BODY MASS INDEX: 27.16 KG/M2 | DIASTOLIC BLOOD PRESSURE: 64 MMHG | OXYGEN SATURATION: 95 % | TEMPERATURE: 97.3 F | HEIGHT: 65 IN | WEIGHT: 163 LBS

## 2017-04-13 DIAGNOSIS — Z01.419 WELL WOMAN EXAM WITH ROUTINE GYNECOLOGICAL EXAM: ICD-10-CM

## 2017-04-13 PROCEDURE — 88175 CYTOPATH C/V AUTO FLUID REDO: CPT

## 2017-04-13 PROCEDURE — 87624 HPV HI-RISK TYP POOLED RSLT: CPT

## 2017-04-13 PROCEDURE — 99000 SPECIMEN HANDLING OFFICE-LAB: CPT | Performed by: FAMILY MEDICINE

## 2017-04-13 PROCEDURE — 99396 PREV VISIT EST AGE 40-64: CPT | Performed by: FAMILY MEDICINE

## 2017-04-13 ASSESSMENT — ENCOUNTER SYMPTOMS
DOUBLE VISION: 0
NAUSEA: 0
BLURRED VISION: 0
VOMITING: 0
NERVOUS/ANXIOUS: 0
DEPRESSION: 0
SENSORY CHANGE: 0
WEIGHT LOSS: 0
HEADACHES: 0
ORTHOPNEA: 0
SPUTUM PRODUCTION: 0
DIARRHEA: 0
HEARTBURN: 0
SHORTNESS OF BREATH: 0
SORE THROAT: 0
FEVER: 0
ABDOMINAL PAIN: 0
CHILLS: 0
PALPITATIONS: 0
SPEECH CHANGE: 0
DIZZINESS: 0
BLOOD IN STOOL: 0
COUGH: 0
MYALGIAS: 0

## 2017-04-13 NOTE — PROGRESS NOTES
Subjective:      Gerri Castelan is a 61 y.o. female who presents with Gynecologic Exam        She is here for her well woman examination with pap    Gynecologic Exam  Pertinent negatives include no abdominal pain, chills, diarrhea, dysuria, fever, frequency, headaches, joint pain, nausea, rash, sore throat, urgency or vomiting.    has a past medical history of Migraine; ESOPHAGITIS; GERD (gastroesophageal reflux disease); Malignant melanoma (CMS-HCC) (12/1983); Dyslipidemia (10/2010); Carcinoma of breast (CMS-HCC) (1/2011); gallbladder polyp (1/2011); S/P bilateral mastectomy (3/2011); and Family history of early CAD (10/17/2016).   has past surgical history that includes tubal ligation (1987); colonoscopy (2/2005); other orthopedic surgery (1997); other (1983); lumpectomy (1/19/2011); node biopsy sentinel (1/19/2011); breast biopsy (2/24/2011); mastectomy (3/29/2011); breast reconstruction (3/29/2011); tissue expander place/remove (3/29/2011); breast reconstruction (7/15/2011); tissue expander place/remove (7/15/2011); breast implant revision (7/15/2011); capsulectomy (7/15/2011); tissue transfer/rearrange (7/15/2011); nipple reconstruction (12/1/2011); flap graft (12/1/2011); and melanie by laparoscopy (12/6/2012).  family history includes Cancer (age of onset: 60) in her mother; Dementia in her mother; Diabetes (age of onset: 54) in her brother; Heart Attack in her brother, maternal grandfather, and sister; Heart Disease (age of onset: 52) in her sister; Hyperlipidemia in her mother.   reports that she has never smoked. She has never used smokeless tobacco. She reports that she drinks about 0.6 oz of alcohol per week. She reports that she does not use illicit drugs.      Current outpatient prescriptions:   •  levothyroxine (SYNTHROID) 50 MCG Tab, Take 1 Tab by mouth every morning before breakfast., Disp: 90 Tab, Rfl: 3  •  atorvastatin (LIPITOR) 20 MG Tab, Take 1 Tab by mouth every bedtime., Disp: 90 Tab,  "Rfl: 3  •  ranitidine (ZANTAC) 150 MG Tab, Take 150 mg by mouth 2 times a day., Disp: , Rfl:   •  aspirin EC (ECOTRIN) 81 MG Tablet Delayed Response, Take 1 Tab by mouth every day., Disp: 30 Tab, Rfl: 0  •  Probiotic Product (PROBIOTIC DAILY PO), Take 1 Cap by mouth every day., Disp: , Rfl:   is allergic to arimidex.      Review of Systems   Constitutional: Negative for fever, chills, weight loss and malaise/fatigue.   HENT: Negative for congestion, hearing loss and sore throat.    Eyes: Negative for blurred vision and double vision.   Respiratory: Negative for cough, sputum production and shortness of breath.    Cardiovascular: Negative for chest pain, palpitations, orthopnea and leg swelling.   Gastrointestinal: Negative for heartburn, nausea, vomiting, abdominal pain, diarrhea and blood in stool.   Genitourinary: Negative for dysuria, urgency and frequency.   Musculoskeletal: Negative for myalgias and joint pain.   Skin: Negative for rash.   Neurological: Negative for dizziness, sensory change, speech change and headaches.   Psychiatric/Behavioral: Negative for depression. The patient is not nervous/anxious.           Objective:     /64 mmHg  Pulse 82  Temp(Src) 36.3 °C (97.3 °F)  Ht 1.651 m (5' 5\")  Wt 73.936 kg (163 lb)  BMI 27.12 kg/m2  SpO2 95%  LMP 10/30/2004     Physical Exam   Constitutional: She is oriented to person, place, and time. She appears well-developed and well-nourished. No distress.   HENT:   Head: Normocephalic and atraumatic.   Mouth/Throat: Oropharynx is clear and moist.   Eyes: EOM are normal. Pupils are equal, round, and reactive to light. Left eye exhibits no discharge.   Neck: Normal range of motion. Neck supple. No JVD present. No thyromegaly present.   Cardiovascular: Normal rate, regular rhythm and normal heart sounds.    No murmur heard.  Pulmonary/Chest: Effort normal and breath sounds normal. No respiratory distress. She has no wheezes. She has no rales. She exhibits " no tenderness. Right breast exhibits no inverted nipple, no mass, no nipple discharge, no skin change and no tenderness. Left breast exhibits no inverted nipple, no mass, no nipple discharge, no skin change and no tenderness. Breasts are symmetrical.   Breasts are post mastectomy with well healed scar and fatty tissue replacement.   Abdominal: Soft. Bowel sounds are normal. She exhibits no distension and no mass. There is no tenderness.   Genitourinary: Vagina normal and uterus normal. No breast tenderness, discharge or bleeding. Cervix exhibits no discharge. Right adnexum displays no mass. Left adnexum displays no mass. No vaginal discharge found.   Pap smear (brush and spatula) taken and sent   Musculoskeletal: Normal range of motion. She exhibits no edema.   Lymphadenopathy:     She has no cervical adenopathy.   Neurological: She is alert and oriented to person, place, and time. Coordination normal.   Skin: Skin is warm and dry. No rash noted. No erythema.   Psychiatric: She has a normal mood and affect. Her behavior is normal.   Vitals reviewed.              Assessment/Plan:     1. Well woman exam with routine gynecological exam  She is generally well and medically stable  - THINPREP PAP WITH HPV; Future    Mammography cannot be performed due to breast reconstruction.  Colonoscopy due 2025.  Immunizations are UTD.

## 2017-04-13 NOTE — MR AVS SNAPSHOT
"Gerri Castelan   2017 11:00 AM   Office Visit   MRN: [id number]    Department:  [facility]   Dept Phone:  [phone]    Description:  Female : 1955   Provider:  Natacha Murillo M.D.           Reason for Visit     Gynecologic Exam           Allergies as of 2017     Allergen Noted Reactions    Arimidex [Anastrozole] 2013   Swelling      You were diagnosed with     Well woman exam with routine gynecological exam   [233152]         Vital Signs     Blood Pressure Pulse Temperature Height Weight Body Mass Index    120/64 mmHg 82 36.3 °C (97.3 °F) 1.651 m (5' 5\") 73.936 kg (163 lb) 27.12 kg/m2    Oxygen Saturation Last Menstrual Period Smoking Status             95% 10/30/2004 Never Smoker          Basic Information     Date Of Birth Sex Race Ethnicity Preferred Language    1955 Female White Non- English      Your appointments     May 09, 2017  2:00 PM   ACUPUNCTURE 30 with ANIA Contreras [facility]uncture [facility] Medicine (--)    [address]  [place] [address]   [phone]            2017  2:00 PM   ACUPUNCTURE 30 with ANIA Contreras [facility] (--)    [address]  [place] [address]   [phone]              Problem List              ICD-10-CM Priority Class Noted - Resolved    GERD (gastroesophageal reflux disease) K21.9   2009 - Present    Family history of breast cancer in mother Z80.3   2010 - Present    Personal history of breast cancer Z85.3   Unknown - Present    Melanoma (CMS-HCC) C43.9   2011 - Present    Chronic migraine without aura without status migrainosus, not intractable G43.709   Unknown - Present    Elevated TSH R94.6   3/22/2016 - Present    Chronic right shoulder pain M25.511, G89.29   2016 - Present    Frequent loose stools R19.7   2016 - Present    Dyslipidemia E78.5   2016 - Present    Hypothyroid E03.9   " 9/26/2016 - Present    History of TIA (transient ischemic attack) Z86.73   10/3/2016 - Present    Family history of early CAD Z82.49   10/17/2016 - Present    Abnormal echocardiogram R93.1   11/17/2016 - Present    Elevated fasting glucose R73.01   12/30/2016 - Present      Health Maintenance        Date Due Completion Dates    PAP SMEAR 3/22/2017 3/22/2016, 1/12/2015, 11/5/2013, 9/20/2012 (Prv Comp), 9/20/2012, 8/30/2011, 8/2/2010    Override on 9/20/2012: Previously completed    IMM DTaP/Tdap/Td Vaccine (2 - Td) 8/2/2021 8/2/2011    COLONOSCOPY 2/24/2025 2/24/2015            Current Immunizations     Influenza TIV (IM) 11/5/2013    Influenza Vaccine Quad Inj (Pf) 10/15/2015    Influenza Vaccine Quad Inj (Preserved) 10/20/2016    SHINGLES VACCINE 10/15/2015    Tdap Vaccine 8/2/2011      Below and/or attached are the medications your provider expects you to take. Review all of your home medications and newly ordered medications with your provider and/or pharmacist. Follow medication instructions as directed by your provider and/or pharmacist. Please keep your medication list with you and share with your provider. Update the information when medications are discontinued, doses are changed, or new medications (including over-the-counter products) are added; and carry medication information at all times in the event of emergency situations     Allergies:  ARIMIDEX - Swelling               Medications  Valid as of: April 13, 2017 - 12:40 PM    Generic Name Brand Name Tablet Size Instructions for use    Aspirin (Tablet Delayed Response) ECOTRIN 81 MG Take 1 Tab by mouth every day.        Atorvastatin Calcium (Tab) LIPITOR 20 MG Take 1 Tab by mouth every bedtime.        Levothyroxine Sodium (Tab) SYNTHROID 50 MCG Take 1 Tab by mouth every morning before breakfast.        Probiotic Product   Take 1 Cap by mouth every day.        RaNITidine HCl (Tab) ZANTAC 150 MG Take 150 mg by mouth 2 times a day.        .                    Medicines prescribed today were sent to:     [facility] - [place], [place] - [address]    [address] [facility] [address]    Phone: [phone] Fax: [phone]    Open 24 Hours?: No    [id number] - [place], [place] - [address] [facility] [address]    [address] [facility] [place] [address]    Phone: [phone] Fax: [phone]    Open 24 Hours?: No      Medication refill instructions:       If your prescription bottle indicates you have medication refills left, it is not necessary to call your provider’s office. Please contact your pharmacy and they will refill your medication.    If your prescription bottle indicates you do not have any refills left, you may request refills at any time through one of the following ways: The online [employer] system (except Urgent Care), by calling your provider’s office, or by asking your pharmacy to contact your provider’s office with a refill request. Medication refills are processed only during regular business hours and may not be available until the next business day. Your provider may request additional information or to have a follow-up visit with you prior to refilling your medication.   *Please Note: Medication refills are assigned a new Rx number when refilled electronically. Your pharmacy may indicate that no refills were authorized even though a new prescription for the same medication is available at the pharmacy. Please request the medicine by name with the pharmacy before contacting your provider for a refill.        Your To Do List     Future Labs/Procedures Complete By Expires    THINPREP PAP WITH HPV  As directed 4/13/2018         [employer] Access Code: Activation code not generated  Current [employer] Status: Active

## 2017-04-15 LAB
CYTOLOGY REG CYTOL: NORMAL
HPV HR 12 DNA CVX QL NAA+PROBE: NEGATIVE
HPV16 DNA SPEC QL NAA+PROBE: NEGATIVE
HPV18 DNA SPEC QL NAA+PROBE: NEGATIVE
SPECIMEN SOURCE: NORMAL

## 2017-05-18 ENCOUNTER — PATIENT MESSAGE (OUTPATIENT)
Dept: MEDICAL GROUP | Facility: CLINIC | Age: 62
End: 2017-05-18

## 2017-05-23 ENCOUNTER — OFFICE VISIT (OUTPATIENT)
Dept: OTHER | Facility: IMAGING CENTER | Age: 62
End: 2017-05-23
Payer: COMMERCIAL

## 2017-05-23 DIAGNOSIS — K59.1 FUNCTIONAL DIARRHEA: ICD-10-CM

## 2017-05-23 DIAGNOSIS — G43.709 CHRONIC MIGRAINE WITHOUT AURA WITHOUT STATUS MIGRAINOSUS, NOT INTRACTABLE: ICD-10-CM

## 2017-05-23 PROCEDURE — 99213 OFFICE O/P EST LOW 20 MIN: CPT | Mod: 25 | Performed by: FAMILY MEDICINE

## 2017-05-23 PROCEDURE — 97811 ACUP 1/> W/O ESTIM EA ADD 15: CPT | Performed by: FAMILY MEDICINE

## 2017-05-23 PROCEDURE — 97813 ACUP 1/> W/ESTIM 1ST 15 MIN: CPT | Performed by: FAMILY MEDICINE

## 2017-05-23 NOTE — PATIENT INSTRUCTIONS
Have encouraged the patient to rest after the acupuncture session today - taking naps or going to sleep early as necessary.  Increase intake of water and refrain from strenuous activities.  Patient may expect to feel transient worsening of symptoms, but this should resolve to benefit in the next day or two after treatment.    The side effects of Acupuncture needle insertion include: minor bruising, bleeding, or pain at the site of needle insertion.  If more worrisome symptoms, such as continued bleeding, severe bruising, or continue pain or altered sensation persist, please contact [facility] office @ [phone]

## 2017-05-23 NOTE — PROGRESS NOTES
[facility] Acupuncture Progress Note  [address] [place] [address]  Dept: [phone]      Patient Name: Gerri Castelan   MRN: [id number]  YOB: 1955  PCP: Natacha Murillo M.D.  Date of Service: 5/23/2017 10:43 AM    CC Migraine, Loose BM with urgency   HPI Patient is a 59 yo  female with chronic loss stool after her GB removal.  She has been also having right shoulder tightness after her procedure of melanoma removal.  She would like her GERD treated as well.  Since last tx she has been having no right shoulder tightness.  Her left periorbital headache still occurs a few times with lesser intensity.  Her frontal sinus has lessor pressure compared to the last time.   ROS Birthplace: Not asked.  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   • Dyslipidemia 10/2010   • Carcinoma of breast (CMS-HCC) 1/2011     diagnosed at 55, no chemo required due to oncotype   • gallbladder polyp 1/2011   • S/P bilateral mastectomy 3/2011     breast reconstruction and then revision   • Family history of early CAD 10/17/2016     Past Surgical History   Procedure Laterality Date   • Tubal ligation  1987   • Colonoscopy  2/2005     normal, due 2015   • Other orthopedic surgery  1997     knee surgery ACL repair   • Other  1983     malignant melanoma skin cancer upper back   • Lumpectomy  1/19/2011     Performed by HANNAH LEVIN at SURGERY SAME DAY [facility] ORS   • Node biopsy sentinel  1/19/2011     Performed by HANNAH LEVIN at SURGERY SAME DAY [facility] ORS   • Breast biopsy  2/24/2011     Performed by HANNAH LEVIN at SURGERY SAME DAY [facility] ORS   • Mastectomy  3/29/2011     Performed by HANNAH LEVIN at SURGERY [facility] ORS   • Breast reconstruction  3/29/2011     Performed by PATEL DALTON at SURGERY [facility]  ORS   • Tissue expander place/remove  3/29/2011     Performed by PATEL DALTON at [facility]   • Breast reconstruction  7/15/2011     Performed by PATEL DALTON at SURGERY [facility]   • Tissue expander place/remove  7/15/2011     Performed by PATEL DALTON at [facility] ORS   • Breast implant revision  7/15/2011     Performed by PATEL DALTON at [facility] ORS   • Capsulectomy  7/15/2011     Performed by PATEL DALTON at [facility] ORS   • Tissue transfer/rearrange  7/15/2011     Performed by PATEL DALTON at [facility]   • Nipple reconstruction  12/1/2011     Performed by PATEL DALTON at [facility]   • Flap graft  12/1/2011     Performed by PATEL DALTON at [facility]   • Tonya by laparoscopy  12/6/2012     Performed by Yessica Ocampo M.D. at SURGERY [facility]      SH Social History     Social History   • Marital Status:      Spouse Name: N/A   • Number of Children: N/A   • Years of Education: N/A     Social History Main Topics   • Smoking status: Never Smoker    • Smokeless tobacco: Never Used   • Alcohol Use: 0.6 oz/week     1 Standard drinks or equivalent per week      Comment: rarely   • Drug Use: No   • Sexual Activity:     Partners: Male     Other Topics Concern   • Not on file     Social History Narrative      MEDS Current Outpatient Prescriptions on File Prior to Visit   Medication Sig Dispense Refill   • levothyroxine (SYNTHROID) 50 MCG Tab Take 1 Tab by mouth every morning before breakfast. 90 Tab 3   • atorvastatin (LIPITOR) 20 MG Tab Take 1 Tab by mouth every bedtime. 90 Tab 3   • ranitidine (ZANTAC) 150 MG Tab Take 150 mg by mouth 2 times a day.     • aspirin EC (ECOTRIN) 81 MG Tablet Delayed Response Take 1 Tab by mouth every day. 30 Tab 0   • Probiotic Product (PROBIOTIC DAILY PO) Take 1 Cap by mouth every day.       No current facility-administered medications on  file prior to visit.      ALLERGIES Allergies   Allergen Reactions   • Arimidex [Anastrozole] Swelling      PE Marguerite Exam: Stomach Qi: (+) pecking radial pulse  (+) Oketsu, (+) Immune,   (L) Adrenal - B/LKid16 St9 DaiMai ASIS Kid2         Assessment Eastern Liver/blood stagnation, Stomach qi imbalance, Immune imbalance, Adrenal exhaustion.    Western Encounter Diagnoses   Name Primary?   • Chronic migraine without aura without status migrainosus, not intractable    • Functional diarrhea                   Plan Set 1: Left (Lv4, Lu5), B/L LI10-11 area  Set 2: R Kd3, 7, 9, 10, 27  Set 3: L (LI 4 --> LI 10 TW 5, SI 7 --> SI 9, SP 6 ++> SP 9, LR 3.3, KD 7 ++> KD 10), R (BOSSMAN 7--> BOSSMAN 5, PC 5, ST 39 --> ST 36, GB 34, BL 59 --> BL 40, Ht 5)  Set 4: R Er pung 1 syd, L Er pung 2 syd, Upper feeling area 2/5   Total face to face time was 20 minutes with more than 15 minutes were spent discussing with the patient about her condition which did not include procedure time. >50% of the face to face time was spent in counseling and coordination. Topics discussed included:   The elimination diet is not as useful here.  Discussed with her about mindfulness based stress management.  Increase her dietary source of fiber or use psyllium but avoid fiber supplement as inadequate water intake can exacerbate her situation. Use of prime tea with her diet choice.  Total acupuncture treatment time = 45 minutes    Chema Hopkins D.O.

## 2017-05-23 NOTE — MR AVS SNAPSHOT
Gerri Castelan   2017 10:30 AM   Office Visit   MRN: [id number]    Department:  Acupuncture Med   Dept Phone:  [phone]    Description:  Female : 1955   Provider:  Chema Hopkins D.O.           Allergies as of 2017     Allergen Noted Reactions    Arimidex [Anastrozole] 2013   Swelling      You were diagnosed with     Chronic migraine without aura without status migrainosus, not intractable   [889840]       Functional diarrhea   [564.5.ICD-9-CM]         Vital Signs     Last Menstrual Period Smoking Status                10/30/2004 Never Smoker           Basic Information     Date Of Birth Sex Race Ethnicity Preferred Language    1955 Female White Non- English      Your appointments     2017 11:00 AM   ACUPUNCTURE 30 with Chema Hopkins D.O.   [facility]upuncture and Integr[facility] (--)    [address] [facility].  [place] [address]   [phone]            2017  8:45 AM   Non Provider 1 with [address]  [place] [address] [phone]           You will be receiving a confirmation call a few days before your appointment from our automated call confirmation system.            2018  8:30 AM   Non Provider 1 with [address]  [place] [address] [phone]           You will be receiving a confirmation call a few days before your appointment from our automated call confirmation system.              Problem List              ICD-10-CM Priority Class Noted - Resolved    GERD (gastroesophageal reflux disease) K21.9   2009 - Present    Family history of breast cancer in mother Z80.3   2010 - Present    Personal history of breast cancer Z85.3   Unknown - Present    Melanoma (CMS-HCC) C43.9   2011 - Present    Chronic migraine without aura without status migrainosus, not intractable G43.709   Unknown  - Present    Elevated TSH R94.6   3/22/2016 - Present    Chronic right shoulder pain M25.511, G89.29   9/22/2016 - Present    Frequent loose stools R19.7   9/22/2016 - Present    Dyslipidemia E78.5   9/26/2016 - Present    Hypothyroid E03.9   9/26/2016 - Present    History of TIA (transient ischemic attack) Z86.73   10/3/2016 - Present    Family history of early CAD Z82.49   10/17/2016 - Present    Abnormal echocardiogram R93.1   11/17/2016 - Present    Elevated fasting glucose R73.01   12/30/2016 - Present      Health Maintenance        Date Due Completion Dates    PAP SMEAR 4/13/2018 4/13/2017, 3/22/2016, 1/12/2015, 11/5/2013, 9/20/2012 (Prv Comp), 9/20/2012, 8/30/2011, 8/2/2010    Override on 9/20/2012: Previously completed    IMM DTaP/Tdap/Td Vaccine (2 - Td) 8/2/2021 8/2/2011    COLONOSCOPY 2/24/2025 2/24/2015            Current Immunizations     Influenza TIV (IM) 11/5/2013    Influenza Vaccine Quad Inj (Pf) 10/15/2015    Influenza Vaccine Quad Inj (Preserved) 10/20/2016    SHINGLES VACCINE 10/15/2015    Tdap Vaccine 8/2/2011      Below and/or attached are the medications your provider expects you to take. Review all of your home medications and newly ordered medications with your provider and/or pharmacist. Follow medication instructions as directed by your provider and/or pharmacist. Please keep your medication list with you and share with your provider. Update the information when medications are discontinued, doses are changed, or new medications (including over-the-counter products) are added; and carry medication information at all times in the event of emergency situations     Allergies:  ARIMIDEX - Swelling               Medications  Valid as of: May 23, 2017 - 11:34 AM    Generic Name Brand Name Tablet Size Instructions for use    Aspirin (Tablet Delayed Response) ECOTRIN 81 MG Take 1 Tab by mouth every day.        Atorvastatin Calcium (Tab) LIPITOR 20 MG Take 1 Tab by mouth every bedtime.         Levothyroxine Sodium (Tab) SYNTHROID 50 MCG Take 1 Tab by mouth every morning before breakfast.        Probiotic Product   Take 1 Cap by mouth every day.        RaNITidine HCl (Tab) ZANTAC 150 MG Take 150 mg by mouth 2 times a day.        .                 Medicines prescribed today were sent to:     [facility] - [place], [place] - [address]    [address] [facility] [address]    Phone: [phone] Fax: [phone]    Open 24 Hours?: No    [facility] - [place], [place] - [address] [facility] [address]    [address] [place] [address]    Phone: [phone] Fax: [phone]    Open 24 Hours?: No      Medication refill instructions:       If your prescription bottle indicates you have medication refills left, it is not necessary to call your provider’s office. Please contact your pharmacy and they will refill your medication.    If your prescription bottle indicates you do not have any refills left, you may request refills at any time through one of the following ways: The online "[employer] system (except Urgent Care), by calling your provider’s office, or by asking your pharmacy to contact your provider’s office with a refill request. Medication refills are processed only during regular business hours and may not be available until the next business day. Your provider may request additional information or to have a follow-up visit with you prior to refilling your medication.   *Please Note: Medication refills are assigned a new Rx number when refilled electronically. Your pharmacy may indicate that no refills were authorized even though a new prescription for the same medication is available at the pharmacy. Please request the medicine by name with the pharmacy before contacting your provider for a refill.        Instructions    Have encouraged the patient to rest after the acupuncture session today - taking naps or going to sleep early as  necessary.  Increase intake of water and refrain from strenuous activities.  Patient may expect to feel transient worsening of symptoms, but this should resolve to benefit in the next day or two after treatment.    The side effects of Acupuncture needle insertion include: minor bruising, bleeding, or pain at the site of needle insertion.  If more worrisome symptoms, such as continued bleeding, severe bruising, or continue pain or altered sensation persist, please contact [facility] office @ [phone]            [employer] Access Code: Activation code not generated  Current [employer] Status: Active

## 2017-06-13 ENCOUNTER — OFFICE VISIT (OUTPATIENT)
Dept: OTHER | Facility: IMAGING CENTER | Age: 62
End: 2017-06-13
Payer: COMMERCIAL

## 2017-06-13 DIAGNOSIS — G43.709 CHRONIC MIGRAINE WITHOUT AURA WITHOUT STATUS MIGRAINOSUS, NOT INTRACTABLE: ICD-10-CM

## 2017-06-13 DIAGNOSIS — M25.511 CHRONIC RIGHT SHOULDER PAIN: ICD-10-CM

## 2017-06-13 DIAGNOSIS — G89.29 CHRONIC RIGHT SHOULDER PAIN: ICD-10-CM

## 2017-06-13 PROCEDURE — 97811 ACUP 1/> W/O ESTIM EA ADD 15: CPT | Performed by: FAMILY MEDICINE

## 2017-06-13 PROCEDURE — 99213 OFFICE O/P EST LOW 20 MIN: CPT | Mod: 25 | Performed by: FAMILY MEDICINE

## 2017-06-13 PROCEDURE — 97813 ACUP 1/> W/ESTIM 1ST 15 MIN: CPT | Performed by: FAMILY MEDICINE

## 2017-06-13 NOTE — PATIENT INSTRUCTIONS
Have encouraged the patient to rest after the acupuncture session today - taking naps or going to sleep early as necessary.  Increase intake of water and refrain from strenuous activities.  Patient may expect to feel transient worsening of symptoms, but this should resolve to benefit in the next day or two after treatment.    The side effects of Acupuncture needle insertion include: minor bruising, bleeding, or pain at the site of needle insertion.  If more worrisome symptoms, such as continued bleeding, severe bruising, or continue pain or altered sensation persist, please contact [facility] office @ [phone]

## 2017-06-13 NOTE — PROGRESS NOTES
[facility] Acupuncture Progress Note  [address] [place] [address]  Dept: [phone]      Patient Name: Gerri Castelan   MRN: [id number]  YOB: 1955  PCP: Natacha Murillo M.D.  Date of Service: 6/13/2017 11:06 AM    CC Migraine, Loose BM with urgency   HPI Patient is a 59 yo  female with chronic loss stool after her GB removal.  She has been also having right shoulder tightness after her procedure of melanoma removal.  She would like her GERD treated as well.  Since last tx she has been having no right shoulder tightness.  Her left periorbital headache is resolving.  Her frontal sinus has lessor pressure compared to the last time but unfortunately got likely norovirus from her granddaughter.  Will have vacation in a few weeks.   ROS Birthplace: Not asked.  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   • Dyslipidemia 10/2010   • Carcinoma of breast (CMS-HCC) 1/2011     diagnosed at 55, no chemo required due to oncotype   • gallbladder polyp 1/2011   • S/P bilateral mastectomy 3/2011     breast reconstruction and then revision   • Family history of early CAD 10/17/2016     Past Surgical History   Procedure Laterality Date   • Tubal ligation  1987   • Colonoscopy  2/2005     normal, due 2015   • Other orthopedic surgery  1997     knee surgery ACL repair   • Other  1983     malignant melanoma skin cancer upper back   • Lumpectomy  1/19/2011     Performed by HANNAH LEVIN at SURGERY SAME DAY [facility] ORS   • Node biopsy sentinel  1/19/2011     Performed by HANNAH LEVIN at SURGERY SAME DAY [facility] ORS   • Breast biopsy  2/24/2011     Performed by HANNAH LEVIN at SURGERY SAME DAY [facility] ORS   • Mastectomy  3/29/2011     Performed by HANNAH LEVIN at SURGERY [facility] ORS   • Breast reconstruction   3/29/2011     Performed by PATEL DALTON at SURGERY [facility]   • Tissue expander place/remove  3/29/2011     Performed by PATEL DALTON at SURGERY [facility]   • Breast reconstruction  7/15/2011     Performed by PATEL DALTON at [facility]   • Tissue expander place/remove  7/15/2011     Performed by PATEL DALTON at [facility]   • Breast implant revision  7/15/2011     Performed by PATEL DALTON at [facility]   • Capsulectomy  7/15/2011     Performed by PATEL DALTON at [facility]   • Tissue transfer/rearrange  7/15/2011     Performed by PATEL DALTON at [facility]   • Nipple reconstruction  12/1/2011     Performed by PATEL DALTON at [facility]   • Flap graft  12/1/2011     Performed by PATEL DALTON at [facility]   • Tonya by laparoscopy  12/6/2012     Performed by Yessica Ocampo M.D. at SURGERY [facility]      SH Social History     Social History   • Marital Status:      Spouse Name: N/A   • Number of Children: N/A   • Years of Education: N/A     Social History Main Topics   • Smoking status: Never Smoker    • Smokeless tobacco: Never Used   • Alcohol Use: 0.6 oz/week     1 Standard drinks or equivalent per week      Comment: rarely   • Drug Use: No   • Sexual Activity:     Partners: Male     Other Topics Concern   • Not on file     Social History Narrative      MEDS Current Outpatient Prescriptions on File Prior to Visit   Medication Sig Dispense Refill   • levothyroxine (SYNTHROID) 50 MCG Tab Take 1 Tab by mouth every morning before breakfast. 90 Tab 3   • atorvastatin (LIPITOR) 20 MG Tab Take 1 Tab by mouth every bedtime. 90 Tab 3   • ranitidine (ZANTAC) 150 MG Tab Take 150 mg by mouth 2 times a day.     • aspirin EC (ECOTRIN) 81 MG Tablet Delayed Response Take 1 Tab by mouth every day. 30 Tab 0   • Probiotic Product (PROBIOTIC DAILY PO) Take 1 Cap by mouth  every day.       No current facility-administered medications on file prior to visit.      ALLERGIES Allergies   Allergen Reactions   • Arimidex [Anastrozole] Swelling      PE Marguerite Exam: Stomach Qi: (+) pecking radial pulse  (+) Oketsu, (+) Immune,   (L) Adrenal - B/LKid16 St9 DaiMai ASIS Kid2         Assessment Eastern Liver/blood stagnation, Stomach qi imbalance, Immune imbalance, Adrenal exhaustion.    Western Encounter Diagnoses   Name Primary?   • Chronic migraine without aura without status migrainosus, not intractable    • Chronic right shoulder pain                   Plan Set 1: Left (Lv4, Lu5), B/L LI10-11 area  Set 2: R Kd3, 7, 9, 10, 27  Set 3: L (LI 4 --> LI 10 TW 5, SI 7 --> SI 9, SP 6 ++> SP 9, LR 3.3, KD 7 ++> KD 10), R (BOSSMAN 7--> BOSSMAN 5, PC 5, ST 39 --> ST 36, GB 34, BL 59 --> BL 40, Ht 5)  Set 4: R Er pung 1 syd, L Er pung 2 syd, Upper feeling area 2/5   Total face to face time was 20 minutes with more than 15 minutes were spent discussing with the patient about her condition which did not include procedure time. >50% of the face to face time was spent in counseling and coordination. Topics discussed included:   Continue mindfulness based stress management twice daily as she gets back to her routine.  Continue her increase her dietary source of fiber or use psyllium but avoid fiber supplement as inadequate water intake can exacerbate her situation.  Continue daily of prime tea with her diet choice.  Total acupuncture treatment time = 45 minutes    Chema Hopkins D.O.

## 2017-06-13 NOTE — MR AVS SNAPSHOT
Gerri Castelan   2017 11:00 AM   Office Visit   MRN: [id number]    Department:  Acupuncture Med   Dept Phone:  [phone]    Description:  Female : 1955   Provider:  Chema Hopkins D.O.           Allergies as of 2017     Allergen Noted Reactions    Arimidex [Anastrozole] 2013   Swelling      You were diagnosed with     Chronic migraine without aura without status migrainosus, not intractable   [152458]       Chronic right shoulder pain   [760224]         Vital Signs     Last Menstrual Period Smoking Status                10/30/2004 Never Smoker           Basic Information     Date Of Birth Sex Race Ethnicity Preferred Language    1955 Female White Non- English      Your appointments     2017 11:00 AM   Established Patient with Natacha Murillo M.D.   [address]  [place] [address]   [phone]           You will be receiving a confirmation call a few days before your appointment from our automated call confirmation system.            2017  8:45 AM   Non Provider 1 with JAM [address]  [place] [address]   [phone]           You will be receiving a confirmation call a few days before your appointment from our automated call confirmation system.            Aug 29, 2017 11:00 AM   ACUPUNCTURE 30 with Chema Hopkins D.O.   [facility] (--)    [address] [facility]  [place] [address]   [phone]            2018  8:30 AM   Non Provider 1 with JAM ANDREW   [address]  [place] [address]   [phone]           You will be receiving a confirmation call a few days before your appointment from our automated call confirmation system.              Problem List              ICD-10-CM Priority Class Noted - Resolved    GERD (gastroesophageal reflux  disease) K21.9   5/19/2009 - Present    Family history of breast cancer in mother Z80.3   12/1/2010 - Present    Personal history of breast cancer Z85.3   Unknown - Present    Melanoma (CMS-HCC) C43.9   2/2/2011 - Present    Chronic migraine without aura without status migrainosus, not intractable G43.709   Unknown - Present    Elevated TSH R94.6   3/22/2016 - Present    Chronic right shoulder pain M25.511, G89.29   9/22/2016 - Present    Frequent loose stools R19.7   9/22/2016 - Present    Dyslipidemia E78.5   9/26/2016 - Present    Hypothyroid E03.9   9/26/2016 - Present    History of TIA (transient ischemic attack) Z86.73   10/3/2016 - Present    Family history of early CAD Z82.49   10/17/2016 - Present    Abnormal echocardiogram R93.1   11/17/2016 - Present    Elevated fasting glucose R73.01   12/30/2016 - Present      Health Maintenance        Date Due Completion Dates    PAP SMEAR 4/13/2018 4/13/2017, 3/22/2016, 1/12/2015, 11/5/2013, 9/20/2012 (Prv Comp), 9/20/2012, 8/30/2011, 8/2/2010    Override on 9/20/2012: Previously completed    IMM DTaP/Tdap/Td Vaccine (2 - Td) 8/2/2021 8/2/2011    COLONOSCOPY 2/24/2025 2/24/2015            Current Immunizations     Influenza TIV (IM) 11/5/2013    Influenza Vaccine Quad Inj (Pf) 10/15/2015    Influenza Vaccine Quad Inj (Preserved) 10/20/2016    SHINGLES VACCINE 10/15/2015    Tdap Vaccine 8/2/2011      Below and/or attached are the medications your provider expects you to take. Review all of your home medications and newly ordered medications with your provider and/or pharmacist. Follow medication instructions as directed by your provider and/or pharmacist. Please keep your medication list with you and share with your provider. Update the information when medications are discontinued, doses are changed, or new medications (including over-the-counter products) are added; and carry medication information at all times in the event of emergency situations     Allergies:   ARIMIDEX - Swelling               Medications  Valid as of: June 13, 2017 - 12:04 PM    Generic Name Brand Name Tablet Size Instructions for use    Aspirin (Tablet Delayed Response) ECOTRIN 81 MG Take 1 Tab by mouth every day.        Atorvastatin Calcium (Tab) LIPITOR 20 MG Take 1 Tab by mouth every bedtime.        Levothyroxine Sodium (Tab) SYNTHROID 50 MCG Take 1 Tab by mouth every morning before breakfast.        Probiotic Product   Take 1 Cap by mouth every day.        RaNITidine HCl (Tab) ZANTAC 150 MG Take 150 mg by mouth 2 times a day.        .                 Medicines prescribed today were sent to:     [facility] - [place], [place] - [address]    [address] [facility] [address] [facility] [address]    Phone: [phone] Fax: [phone]    Open 24 Hours?: No    [facility] [address] [facility], [place] - [address] AT [facility]    [address] [place] [address]    Phone: [phone] Fax: [phone]    Open 24 Hours?: No      Medication refill instructions:       If your prescription bottle indicates you have medication refills left, it is not necessary to call your provider’s office. Please contact your pharmacy and they will refill your medication.    If your prescription bottle indicates you do not have any refills left, you may request refills at any time through one of the following ways: The online [employer] system (except Urgent Care), by calling your provider’s office, or by asking your pharmacy to contact your provider’s office with a refill request. Medication refills are processed only during regular business hours and may not be available until the next business day. Your provider may request additional information or to have a follow-up visit with you prior to refilling your medication.   *Please Note: Medication refills are assigned a new Rx number when refilled electronically. Your pharmacy may indicate that no refills were authorized even  though a new prescription for the same medication is available at the pharmacy. Please request the medicine by name with the pharmacy before contacting your provider for a refill.        Instructions    Have encouraged the patient to rest after the acupuncture session today - taking naps or going to sleep early as necessary.  Increase intake of water and refrain from strenuous activities.  Patient may expect to feel transient worsening of symptoms, but this should resolve to benefit in the next day or two after treatment.    The side effects of Acupuncture needle insertion include: minor bruising, bleeding, or pain at the site of needle insertion.  If more worrisome symptoms, such as continued bleeding, severe bruising, or continue pain or altered sensation persist, please contact [facility] office @ [phone]          Back& Access Code: Activation code not generated  Current Back& Status: Active

## 2017-06-15 ENCOUNTER — TELEPHONE (OUTPATIENT)
Dept: MEDICAL GROUP | Facility: CLINIC | Age: 62
End: 2017-06-15

## 2017-06-15 NOTE — TELEPHONE ENCOUNTER
ESTABLISHED PATIENT PRE-VISIT PLANNING     Note: Patient will not be contacted if there is no indication to call.     1.  Reviewed notes from the last few office visits within the medical group: Yes    2.  If any orders were placed at last visit or intended to be done for this visit (i.e. 6 mos follow-up), do we have Results/Consult Notes?        •  Labs - Labs ordered, completed and results are in chart.       •  Imaging - Imaging was not ordered at last office visit.       •  Referrals - No referrals were ordered at last office visit.    3. Is this appointment scheduled as a Hospital Follow-Up? No    4.  Immunizations were updated in Epic using WebIZ?: Epic matches WebIZ       •  Web Iz Recommendations: Td (adult), adsorbed Hep A, adult      5.  Patient is due for the following Health Maintenance Topics:   There are no preventive care reminders to display for this patient.      6.  Patient was NOT informed to arrive 15 min prior to their scheduled appointment and bring in their medication bottles.

## 2017-06-19 ENCOUNTER — HOSPITAL ENCOUNTER (OUTPATIENT)
Dept: LAB | Facility: MEDICAL CENTER | Age: 62
End: 2017-06-19
Attending: FAMILY MEDICINE
Payer: COMMERCIAL

## 2017-06-19 ENCOUNTER — OFFICE VISIT (OUTPATIENT)
Dept: MEDICAL GROUP | Facility: CLINIC | Age: 62
End: 2017-06-19
Payer: COMMERCIAL

## 2017-06-19 VITALS
RESPIRATION RATE: 16 BRPM | HEART RATE: 70 BPM | SYSTOLIC BLOOD PRESSURE: 120 MMHG | WEIGHT: 163 LBS | HEIGHT: 65 IN | OXYGEN SATURATION: 94 % | TEMPERATURE: 98.6 F | BODY MASS INDEX: 27.16 KG/M2 | DIASTOLIC BLOOD PRESSURE: 70 MMHG

## 2017-06-19 DIAGNOSIS — R19.7 DIARRHEA IN ADULT PATIENT: ICD-10-CM

## 2017-06-19 DIAGNOSIS — K21.9 GASTROESOPHAGEAL REFLUX DISEASE WITHOUT ESOPHAGITIS: ICD-10-CM

## 2017-06-19 PROCEDURE — 99213 OFFICE O/P EST LOW 20 MIN: CPT | Performed by: FAMILY MEDICINE

## 2017-06-19 RX ORDER — CHOLESTYRAMINE 4 G/9G
1 POWDER, FOR SUSPENSION ORAL DAILY
Qty: 90 EACH | Refills: 3 | Status: SHIPPED | OUTPATIENT
Start: 2017-06-19 | End: 2018-10-02 | Stop reason: SDUPTHER

## 2017-06-19 NOTE — PROGRESS NOTES
CC: diarrhea    HPI:   Gerri presents today with the following.    1. Diarrhea in adult patient  She has been using probiotics, metamucil.  Has reduced coffee.  Tried the cholestyramine, helps a lot, sometimes actually constipates her.  She is not using that regularly.  The metamucil has helped.  Denies fever or chills.  Denies blood in the stool.  Poop is sometimes grayish, rarely.  Normal colonoscopy in 2015.    2. Gastroesophageal reflux disease without esophagitis  She feels the current medication regimen of ranitidine 150 mg is controlling the gastroesophageal reflux symptoms fairly well. Denies dysphagia, acidity, abdominal pain or visible blood or mucus in the stool. She is getting some abdominal bloating and occasional reflux symptoms. Denies vomiting or hematemesis. Rarely on eating something in particular she will get significant acidity and is not sure how to combat that. Patient avoids nonsteroidal anti-inflammatory drugs. Avoids heavy meals or eating within 2 hours of bedtime.        Patient Active Problem List    Diagnosis Date Noted   • Elevated fasting glucose 12/30/2016   • Abnormal echocardiogram 11/17/2016   • Family history of early CAD 10/17/2016   • History of TIA (transient ischemic attack) 10/03/2016   • Dyslipidemia 09/26/2016   • Hypothyroid 09/26/2016   • Chronic right shoulder pain 09/22/2016   • Frequent loose stools 09/22/2016   • Elevated TSH 03/22/2016   • Chronic migraine without aura without status migrainosus, not intractable    • Melanoma (CMS-HCC) 02/02/2011   • Personal history of breast cancer    • Family history of breast cancer in mother 12/01/2010   • GERD (gastroesophageal reflux disease) 05/19/2009       Current Outpatient Prescriptions   Medication Sig Dispense Refill   • levothyroxine (SYNTHROID) 50 MCG Tab Take 1 Tab by mouth every morning before breakfast. 90 Tab 3   • atorvastatin (LIPITOR) 20 MG Tab Take 1 Tab by mouth every bedtime. 90 Tab 3   • ranitidine (ZANTAC)  "150 MG Tab Take 150 mg by mouth 2 times a day.     • aspirin EC (ECOTRIN) 81 MG Tablet Delayed Response Take 1 Tab by mouth every day. 30 Tab 0   • Probiotic Product (PROBIOTIC DAILY PO) Take 1 Cap by mouth every day.       No current facility-administered medications for this visit.         Allergies as of 06/19/2017 - [employer] as Reviewed 06/19/2017   Allergen Reaction Noted   • Arimidex [anastrozole] Swelling 09/09/2013        ROS: As per HPI.    /70 mmHg  Pulse 70  Temp(Src) 37 °C (98.6 °F)  Resp 16  Ht 1.651 m (5' 5\")  Wt 73.936 kg (163 lb)  BMI 27.12 kg/m2  SpO2 94%  LMP 10/30/2004    Physical Exam:  Gen:         Alert and oriented, No apparent distress.  Lucid and fluent.  Neck:       Supple, No Lymphadenopathy or Bruits.  Lungs:     Clear to auscultation bilaterally  CV:          Regular rate and rhythm. No murmurs, rubs or gallops.    Abd:         Soft, moderate bloating, nontender, bowel sounds are normal to auscultation. No hepatosplenomegaly or masses appreciated.   Ext:          No clubbing, cyanosis, edema.    Colonoscopy report is reviewed, normal in 2015      Assessment and Plan.   61 y.o. female with the following issues.    1. Diarrhea in adult patient  Her symptoms are really most consistent with lymphocytic colitis. She may have some irritable bowel as well. She had a normal colonoscopy less than 2 years ago. She has been exposed to a patient with C. difficile, her mother. She was very careful while she was there. However, testing would be prudent. Cholestyramine worked extremely well in the past, a little bit too well and discussed the ways to use this medication.  - CDIFF BY PCR; Future  - CULTURE STOOL; Future  - cholestyramine (QUESTRAN) 4 G packet; Take 4 g by mouth every day.  Dispense: 90 Each; Refill: 3    2. Gastroesophageal reflux disease without esophagitis  She will continue the ranitidine. It is working well low not ideally. Discussed using a PPI for rescue on occasion for " 3-5 days. Discussed that it appears to be extremely safe in this usage.

## 2017-06-19 NOTE — MR AVS SNAPSHOT
"Gerri Castelan   2017 11:00 AM   Office Visit   MRN: [id number]    Department:  [facility]   Dept Phone:  [phone]    Description:  Female : 1955   Provider:  Natacha Murillo M.D.           Reason for Visit     Diarrhea           Allergies as of 2017     Allergen Noted Reactions    Arimidex [Anastrozole] 2013   Swelling      You were diagnosed with     Diarrhea in adult patient   [629481]       Gastroesophageal reflux disease without esophagitis   [705489]         Vital Signs     Blood Pressure Pulse Temperature Respirations Height Weight    120/70 mmHg 70 37 °C (98.6 °F) 16 1.651 m (5' 5\") 73.936 kg (163 lb)    Body Mass Index Oxygen Saturation Last Menstrual Period Smoking Status          27.12 kg/m2 94% 10/30/2004 Never Smoker         Basic Information     Date Of Birth Sex Race Ethnicity Preferred Language    1955 Female White Non- English      Your appointments     2017  8:45 AM   Non Provider 1 with JAM [address] [place] [address]   [phone]           You will be receiving a confirmation call a few days before your appointment from our automated call confirmation system.            Aug 29, 2017 11:00 AM   ACUPUNCTURE 30 with Chema Hopkins D.O.   [facility] (--)    [address] [facility].  [place] [address]   [phone]            2018  8:30 AM   Non Provider 1 with JAM [address] [place] [address]   [phone]           You will be receiving a confirmation call a few days before your appointment from our automated call confirmation system.              Problem List              ICD-10-CM Priority Class Noted - Resolved    GERD (gastroesophageal reflux disease) K21.9   2009 - Present    Family history of breast cancer in mother Z80.3   2010 - Present   " Personal history of breast cancer Z85.3   Unknown - Present    Melanoma (CMS-HCC) C43.9   2/2/2011 - Present    Chronic migraine without aura without status migrainosus, not intractable G43.709   Unknown - Present    Elevated TSH R94.6   3/22/2016 - Present    Chronic right shoulder pain M25.511, G89.29   9/22/2016 - Present    Frequent loose stools R19.7   9/22/2016 - Present    Dyslipidemia E78.5   9/26/2016 - Present    Hypothyroid E03.9   9/26/2016 - Present    History of TIA (transient ischemic attack) Z86.73   10/3/2016 - Present    Family history of early CAD Z82.49   10/17/2016 - Present    Abnormal echocardiogram R93.1   11/17/2016 - Present    Elevated fasting glucose R73.01   12/30/2016 - Present      Health Maintenance        Date Due Completion Dates    PAP SMEAR 4/13/2018 4/13/2017, 3/22/2016, 1/12/2015, 11/5/2013, 9/20/2012 (Prv Comp), 9/20/2012, 8/30/2011, 8/2/2010    Override on 9/20/2012: Previously completed    IMM DTaP/Tdap/Td Vaccine (2 - Td) 8/2/2021 8/2/2011    COLONOSCOPY 2/24/2025 2/24/2015            Current Immunizations     Influenza TIV (IM) 11/5/2013    Influenza Vaccine Quad Inj (Pf) 10/15/2015    Influenza Vaccine Quad Inj (Preserved) 10/20/2016    SHINGLES VACCINE 10/15/2015    Tdap Vaccine 8/2/2011      Below and/or attached are the medications your provider expects you to take. Review all of your home medications and newly ordered medications with your provider and/or pharmacist. Follow medication instructions as directed by your provider and/or pharmacist. Please keep your medication list with you and share with your provider. Update the information when medications are discontinued, doses are changed, or new medications (including over-the-counter products) are added; and carry medication information at all times in the event of emergency situations     Allergies:  ARIMIDEX - Swelling               Medications  Valid as of: June 19, 2017 -  1:01 PM    Generic Name Brand Name  Tablet Size Instructions for use    Aspirin (Tablet Delayed Response) ECOTRIN 81 MG Take 1 Tab by mouth every day.        Atorvastatin Calcium (Tab) LIPITOR 20 MG Take 1 Tab by mouth every bedtime.        Cholestyramine (Pack) QUESTRAN 4 G Take 4 g by mouth every day.        Levothyroxine Sodium (Tab) SYNTHROID 50 MCG Take 1 Tab by mouth every morning before breakfast.        Probiotic Product   Take 1 Cap by mouth every day.        RaNITidine HCl (Tab) ZANTAC 150 MG Take 150 mg by mouth 2 times a day.        .                 Medicines prescribed today were sent to:     [facility] [place], [place] - [address] [facility] [address] [facility]    [address] [facility] [address]    Phone: [phone] Fax: [phone]    Open 24 Hours?: No    [facility] [address] [facility], [place] - [address] AT [facility]    [email or web address] [facility] [address]    Phone: [phone] Fax: [phone]    Open 24 Hours?: No      Medication refill instructions:       If your prescription bottle indicates you have medication refills left, it is not necessary to call your provider’s office. Please contact your pharmacy and they will refill your medication.    If your prescription bottle indicates you do not have any refills left, you may request refills at any time through one of the following ways: The online [employer] system (except Urgent Care), by calling your provider’s office, or by asking your pharmacy to contact your provider’s office with a refill request. Medication refills are processed only during regular business hours and may not be available until the next business day. Your provider may request additional information or to have a follow-up visit with you prior to refilling your medication.   *Please Note: Medication refills are assigned a new Rx number when refilled electronically. Your pharmacy may indicate that no refills were authorized even though a new prescription for the same  medication is available at the pharmacy. Please request the medicine by name with the pharmacy before contacting your provider for a refill.        Your To Do List     Future Labs/Procedures Complete By Expires    CDIFF BY PCR  As directed 6/20/2017    CULTURE STOOL  As directed 6/19/2018         MyChart Access Code: Activation code not generated  Current [employer] Status: Active

## 2017-06-21 ENCOUNTER — TELEPHONE (OUTPATIENT)
Dept: MEDICAL GROUP | Facility: CLINIC | Age: 62
End: 2017-06-21

## 2017-06-21 ENCOUNTER — HOSPITAL ENCOUNTER (OUTPATIENT)
Facility: MEDICAL CENTER | Age: 62
End: 2017-06-21
Attending: FAMILY MEDICINE
Payer: COMMERCIAL

## 2017-06-21 DIAGNOSIS — R19.7 DIARRHEA IN ADULT PATIENT: ICD-10-CM

## 2017-06-21 DIAGNOSIS — K52.9 CHRONIC DIARRHEA: ICD-10-CM

## 2017-06-21 LAB — C DIFF TOX GENS STL QL NAA+PROBE: NORMAL

## 2017-06-21 PROCEDURE — 87046 STOOL CULTR AEROBIC BACT EA: CPT

## 2017-06-21 PROCEDURE — 87493 C DIFF AMPLIFIED PROBE: CPT

## 2017-06-21 PROCEDURE — 87899 AGENT NOS ASSAY W/OPTIC: CPT

## 2017-06-21 PROCEDURE — 87045 FECES CULTURE AEROBIC BACT: CPT

## 2017-06-21 NOTE — TELEPHONE ENCOUNTER
1. Caller Name: bernard from lab                      Call Back Number: [phone]    2. Message: labs tech called and is requesting a new order for Cdiff be placed. Per tech this order  yesterday and will not allow her to release it. Please advise, thank you.    3. Patient approves office to leave a detailed voicemail/MyChart message: N/A

## 2017-06-22 LAB
E COLI SXT1+2 STL IA: NORMAL
SIGNIFICANT IND 70042: NORMAL
SITE SITE: NORMAL
SOURCE SOURCE: NORMAL

## 2017-06-24 LAB
BACTERIA STL CULT: NORMAL
E COLI SXT1+2 STL IA: NORMAL
SIGNIFICANT IND 70042: NORMAL
SITE SITE: NORMAL
SOURCE SOURCE: NORMAL

## 2017-07-03 ENCOUNTER — NON-PROVIDER VISIT (OUTPATIENT)
Dept: MEDICAL GROUP | Facility: CLINIC | Age: 62
End: 2017-07-03
Payer: COMMERCIAL

## 2017-07-03 DIAGNOSIS — Z23 NEED FOR HEPATITIS A IMMUNIZATION: ICD-10-CM

## 2017-07-03 PROCEDURE — 90632 HEPA VACCINE ADULT IM: CPT | Performed by: FAMILY MEDICINE

## 2017-07-03 PROCEDURE — 90471 IMMUNIZATION ADMIN: CPT | Performed by: FAMILY MEDICINE

## 2017-07-03 NOTE — MR AVS SNAPSHOT
Gerri Castelan   7/3/2017 8:45 AM   Non-Provider Visit   MRN: [id number]    Department:  [facility]   Dept Phone:  [phone]    Description:  Female : 1955   Provider:  JAM ANDREW           Reason for Visit     Immunizations hep a      Allergies as of 7/3/2017     Allergen Noted Reactions    Arimidex [Anastrozole] 2013   Swelling      You were diagnosed with     Need for hepatitis A immunization   [342152]         Vital Signs     Last Menstrual Period Smoking Status                10/30/2004 Never Smoker           Basic Information     Date Of Birth Sex Race Ethnicity Preferred Language    1955 Female White Non- English      Your appointments     2017  8:45 AM   Non Provider 1 with JAM ANDREW   [facility]    [address]  [place] [address]   [phone]           You will be receiving a confirmation call a few days before your appointment from our automated call confirmation system.            Aug 29, 2017 11:00 AM   ACUPUNCTURE 30 with Chema Hopkins D.O.   [facility] (--)    [address] [facility]  [place] [address] [place] [address]   [phone]            2018  8:30 AM   Non Provider 1 with JAM ANDREW   [facility]    [address]  [place] [address] [place] [address] [phone]           You will be receiving a confirmation call a few days before your appointment from our automated call confirmation system.              Problem List              ICD-10-CM Priority Class Noted - Resolved    GERD (gastroesophageal reflux disease) K21.9   2009 - Present    Family history of breast cancer in mother Z80.3   2010 - Present    Personal history of breast cancer Z85.3   Unknown - Present    Melanoma (CMS-HCC) C43.9   2011 - Present    Chronic migraine without aura without status migrainosus, not intractable G43.709   Unknown - Present    Elevated TSH R94.6    3/22/2016 - Present    Chronic right shoulder pain M25.511, G89.29   9/22/2016 - Present    Frequent loose stools R19.7   9/22/2016 - Present    Dyslipidemia E78.5   9/26/2016 - Present    Hypothyroid E03.9   9/26/2016 - Present    History of TIA (transient ischemic attack) Z86.73   10/3/2016 - Present    Family history of early CAD Z82.49   10/17/2016 - Present    Abnormal echocardiogram R93.1   11/17/2016 - Present    Elevated fasting glucose R73.01   12/30/2016 - Present      Health Maintenance        Date Due Completion Dates    IMM INFLUENZA (1) 9/1/2017 10/20/2016, 10/15/2015, 11/5/2013    PAP SMEAR 4/13/2018 4/13/2017, 3/22/2016, 1/12/2015, 11/5/2013, 9/20/2012 (Prv Comp), 9/20/2012, 8/30/2011, 8/2/2010    Override on 9/20/2012: Previously completed    IMM DTaP/Tdap/Td Vaccine (2 - Td) 8/2/2021 8/2/2011    COLONOSCOPY 2/24/2025 2/24/2015            Current Immunizations     Hepatitis A Vaccine, Adult 7/3/2017    Influenza TIV (IM) 11/5/2013    Influenza Vaccine Quad Inj (Pf) 10/15/2015    Influenza Vaccine Quad Inj (Preserved) 10/20/2016    SHINGLES VACCINE 10/15/2015    Tdap Vaccine 8/2/2011      Below and/or attached are the medications your provider expects you to take. Review all of your home medications and newly ordered medications with your provider and/or pharmacist. Follow medication instructions as directed by your provider and/or pharmacist. Please keep your medication list with you and share with your provider. Update the information when medications are discontinued, doses are changed, or new medications (including over-the-counter products) are added; and carry medication information at all times in the event of emergency situations     Allergies:  ARIMIDEX - Swelling               Medications  Valid as of: July 03, 2017 -  8:38 AM    Generic Name Brand Name Tablet Size Instructions for use    Aspirin (Tablet Delayed Response) ECOTRIN 81 MG Take 1 Tab by mouth every day.        Atorvastatin  Calcium (Tab) LIPITOR 20 MG Take 1 Tab by mouth every bedtime.        Cholestyramine (Pack) QUESTRAN 4 G Take 4 g by mouth every day.        Levothyroxine Sodium (Tab) SYNTHROID 50 MCG Take 1 Tab by mouth every morning before breakfast.        Probiotic Product   Take 1 Cap by mouth every day.        RaNITidine HCl (Tab) ZANTAC 150 MG Take 150 mg by mouth 2 times a day.        .                 Medicines prescribed today were sent to:     [facility] [place], [place] - [address] [facility]    [address] [address]    Phone: [phone] Fax: [phone]    Open 24 Hours?: No    [facility] [address]  [place], [place] - [address] [facility] [address] [facility]    [address] [place] [address]    Phone: [phone] Fax: [phone]    Open 24 Hours?: No      Medication refill instructions:       If your prescription bottle indicates you have medication refills left, it is not necessary to call your provider’s office. Please contact your pharmacy and they will refill your medication.    If your prescription bottle indicates you do not have any refills left, you may request refills at any time through one of the following ways: The online [employer] system (except Urgent Care), by calling your provider’s office, or by asking your pharmacy to contact your provider’s office with a refill request. Medication refills are processed only during regular business hours and may not be available until the next business day. Your provider may request additional information or to have a follow-up visit with you prior to refilling your medication.   *Please Note: Medication refills are assigned a new Rx number when refilled electronically. Your pharmacy may indicate that no refills were authorized even though a new prescription for the same medication is available at the pharmacy. Please request the medicine by name with the pharmacy before contacting your provider for a refill.            [employer] Access Code: Activation code not generated  Current [employer] Status: Active

## 2017-07-03 NOTE — PROGRESS NOTES
"Gerri Castelan is a 61 y.o. female here for a non-provider visit for:   HEPATITIS A 1 of 2    Reason for immunization: continue or complete series started at the office  Immunization records indicate need for vaccine: Yes, confirmed with Epic  Minimum interval has been met for this vaccine: Yes  ABN completed: Yes    Order and dose verified by: an  VIS Dated  7/20/16 was given to patient: Yes  All IAC Questionnaire questions were answered “No.\"    Patient tolerated injection and no adverse effects were observed or reported: Yes    Pt scheduled for next dose in series: Yes    "

## 2017-08-29 ENCOUNTER — OFFICE VISIT (OUTPATIENT)
Dept: OTHER | Facility: IMAGING CENTER | Age: 62
End: 2017-08-29
Payer: COMMERCIAL

## 2017-08-29 DIAGNOSIS — G89.29 CHRONIC RIGHT SHOULDER PAIN: ICD-10-CM

## 2017-08-29 DIAGNOSIS — M25.511 CHRONIC RIGHT SHOULDER PAIN: ICD-10-CM

## 2017-08-29 DIAGNOSIS — G43.709 CHRONIC MIGRAINE WITHOUT AURA WITHOUT STATUS MIGRAINOSUS, NOT INTRACTABLE: ICD-10-CM

## 2017-08-29 PROCEDURE — 97813 ACUP 1/> W/ESTIM 1ST 15 MIN: CPT | Performed by: FAMILY MEDICINE

## 2017-08-29 PROCEDURE — 97811 ACUP 1/> W/O ESTIM EA ADD 15: CPT | Performed by: FAMILY MEDICINE

## 2017-08-29 PROCEDURE — 99213 OFFICE O/P EST LOW 20 MIN: CPT | Mod: 25 | Performed by: FAMILY MEDICINE

## 2017-08-29 NOTE — PATIENT INSTRUCTIONS
Have encouraged the patient to rest after the acupuncture session today - taking naps or going to sleep early as necessary.  Increase intake of water and refrain from strenuous activities.  Patient may expect to feel transient worsening of symptoms, but this should resolve to benefit in the next day or two after treatment.    The side effects of Acupuncture needle insertion include: minor bruising, bleeding, or pain at the site of needle insertion.  If more worrisome symptoms, such as continued bleeding, severe bruising, or continue pain or altered sensation persist, please contact [facility] office @ [phone]

## 2017-08-29 NOTE — PROGRESS NOTES
[facility] Progress Note  [address] [place] [address]  Dept: [phone]      Patient Name: Gerri Castelan   MRN: [id number]  YOB: 1955  PCP: Natacha Murillo M.D.  Date of Service: 8/29/2017 11:26 AM    CC Migraine, Loose BM with urgency   HPI Patient is a 61 yo  female with chronic loss stool after her GB removal.  She has been also having right shoulder tightness after her procedure of melanoma removal.  She would like her GERD treated as well.  Since last tx she has been having no right shoulder tightness.  Her left periorbital headache is resolving.  Her frontal sinus has lessor pressure compared to the last time but unfortunately got likely norovirus from her granddaughter.  Will have vacation in a few weeks.   ROS Birthplace: Not asked.  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 at SURGERY [facility]   • NIPPLE RECONSTRUCTION  12/1/2011    Performed by PATEL DALTON at SURGERY [facility]   • FLAP GRAFT  12/1/2011    Performed by PATEL DALTON at [facility]   • BREAST RECONSTRUCTION  7/15/2011    Performed by PATEL DALTON at [facility]   • TISSUE EXPANDER PLACE/REMOVE  7/15/2011    Performed by PATEL DALTON at [facility]   • BREAST IMPLANT REVISION  7/15/2011    Performed by PATEL DALTON at [facility]   •  CAPSULECTOMY  7/15/2011    Performed by PATEL DALTON at SURGERY [facility]   • TISSUE TRANSFER/REARRANGE  7/15/2011    Performed by PATEL DALTON at SURGERY [facility]   • MASTECTOMY  3/29/2011    Performed by HANNAH LEVIN at SURGERY [facility]   • BREAST RECONSTRUCTION  3/29/2011    Performed by PATEL DALTON at SURGERY [facility]   • TISSUE EXPANDER PLACE/REMOVE  3/29/2011    Performed by PATEL DALTON at SURGERY [facility]   • BREAST BIOPSY  2/24/2011    Performed by HANNAH LEVIN at SURGERY [facility]   • LUMPECTOMY  1/19/2011    Performed by HANNAH LEVIN at SURGERY [facility]   • NODE BIOPSY SENTINEL  1/19/2011    Performed by HANNAH LEVIN at SURGERY [facility]   • COLONOSCOPY  2/2005    normal, due 2015   • OTHER ORTHOPEDIC SURGERY  1997    knee surgery ACL repair   • TUBAL LIGATION  1987   • OTHER  1983    malignant melanoma skin cancer upper back      SH Social History     Social History   • Marital status:      Spouse name: N/A   • Number of children: N/A   • Years of education: N/A     Social History Main Topics   • Smoking status: Never Smoker   • Smokeless tobacco: Never Used   • Alcohol use 0.6 oz/week     1 Standard drinks or equivalent per week      Comment: rarely   • Drug use: No   • Sexual activity: Yes     Partners: Male     Other Topics Concern   • Not on file     Social History Narrative   • No narrative on file      MEDS Current Outpatient Prescriptions on File Prior to Visit   Medication Sig Dispense Refill   • cholestyramine (QUESTRAN) 4 G packet Take 4 g by mouth every day. 90 Each 3   • levothyroxine (SYNTHROID) 50 MCG Tab Take 1 Tab by mouth every morning before breakfast. 90 Tab 3   • atorvastatin (LIPITOR) 20 MG Tab Take 1 Tab by mouth every bedtime. 90 Tab 3   • ranitidine (ZANTAC) 150 MG Tab Take 150 mg by mouth 2 times a day.     • aspirin EC (ECOTRIN) 81 MG Tablet Delayed Response Take 1 Tab by  mouth every day. 30 Tab 0   • Probiotic Product (PROBIOTIC DAILY PO) Take 1 Cap by mouth every day.       No current facility-administered medications on file prior to visit.       ALLERGIES Allergies   Allergen Reactions   • Arimidex [Anastrozole] Swelling      PE Marguerite Exam: Stomach Qi: (+) pecking radial pulse  (+) Oketsu, (+) Immune,   (L) Adrenal - B/LKid16 St9 DaiMai ASIS Kid2         Assessment Eastern Liver/blood stagnation, Stomach qi imbalance, Immune imbalance, Adrenal exhaustion.    Western Encounter Diagnoses   Name Primary?   • Chronic migraine without aura without status migrainosus, not intractable    • Chronic right shoulder pain                   Plan Set 1: Left (Lv4, Lu5), B/L LI10-11 area  Set 2: R Kd3, 7, 9, 10, 27  Set 3: L (LI 4 --> LI 10 TW 5, SI 7 --> SI 9, SP 6 ++> SP 9, LR 3.3, KD 7 ++> KD 10), R (BOSSMAN 7--> BOSSMAN 5, PC 5, ST 39 --> ST 36, GB 34, BL 59 --> BL 40, Ht 5)  Set 4: R Er pung 1 syd, L Er pung 2 syd, Upper feeling area 2/5   Total face to face time was 20 minutes with more than 15 minutes were spent discussing with the patient about her condition which did not include procedure time. >50% of the face to face time was spent in counseling and coordination. Topics discussed included:   Continue mindfulness based stress management twice daily as she gets back to her routine.  Continue her increase her dietary source of fiber or use psyllium but avoid fiber supplement as inadequate water intake can exacerbate her situation.  Continue daily of prime tea with her diet choice.  Total acupuncture treatment time = 45 minutes    Chema Hopkins D.O.

## 2017-09-26 ENCOUNTER — OFFICE VISIT (OUTPATIENT)
Dept: OTHER | Facility: IMAGING CENTER | Age: 62
End: 2017-09-26
Payer: COMMERCIAL

## 2017-09-26 DIAGNOSIS — G89.29 CHRONIC RIGHT SHOULDER PAIN: ICD-10-CM

## 2017-09-26 DIAGNOSIS — G43.709 CHRONIC MIGRAINE WITHOUT AURA WITHOUT STATUS MIGRAINOSUS, NOT INTRACTABLE: ICD-10-CM

## 2017-09-26 DIAGNOSIS — M25.511 CHRONIC RIGHT SHOULDER PAIN: ICD-10-CM

## 2017-09-26 PROCEDURE — 99213 OFFICE O/P EST LOW 20 MIN: CPT | Mod: 25 | Performed by: FAMILY MEDICINE

## 2017-09-26 PROCEDURE — 97811 ACUP 1/> W/O ESTIM EA ADD 15: CPT | Performed by: FAMILY MEDICINE

## 2017-09-26 PROCEDURE — 97813 ACUP 1/> W/ESTIM 1ST 15 MIN: CPT | Performed by: FAMILY MEDICINE

## 2017-09-26 NOTE — PATIENT INSTRUCTIONS
Have encouraged the patient to rest after the acupuncture session today - taking naps or going to sleep early as necessary.  Increase intake of water and refrain from strenuous activities.  Patient may expect to feel transient worsening of symptoms, but this should resolve to benefit in the next day or two after treatment.    The side effects of Acupuncture needle insertion include: minor bruising, bleeding, or pain at the site of needle insertion.  If more worrisome symptoms, such as continued bleeding, severe bruising, or continue pain or altered sensation persist, please contact [facility] office @ [phone]
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
## 2017-09-27 ENCOUNTER — HOSPITAL ENCOUNTER (OUTPATIENT)
Dept: RADIOLOGY | Facility: MEDICAL CENTER | Age: 62
End: 2017-09-27
Attending: CHIROPRACTOR
Payer: COMMERCIAL

## 2017-09-27 DIAGNOSIS — M99.02 THORACIC SEGMENT DYSFUNCTION: ICD-10-CM

## 2017-09-27 DIAGNOSIS — M99.03 SOMATIC DYSFUNCTION OF LUMBAR REGION: ICD-10-CM

## 2017-09-27 DIAGNOSIS — M99.01 CERVICAL SEGMENT DYSFUNCTION: ICD-10-CM

## 2017-09-27 DIAGNOSIS — M99.04 SOMATIC DYSFUNCTION OF SACRAL REGION: ICD-10-CM

## 2017-09-27 PROCEDURE — 72100 X-RAY EXAM L-S SPINE 2/3 VWS: CPT

## 2017-09-27 PROCEDURE — 72052 X-RAY EXAM NECK SPINE 6/>VWS: CPT

## 2017-09-27 PROCEDURE — 72070 X-RAY EXAM THORAC SPINE 2VWS: CPT

## 2017-09-27 PROCEDURE — 72170 X-RAY EXAM OF PELVIS: CPT

## 2017-10-13 ENCOUNTER — NON-PROVIDER VISIT (OUTPATIENT)
Dept: MEDICAL GROUP | Facility: CLINIC | Age: 62
End: 2017-10-13
Payer: COMMERCIAL

## 2017-10-13 DIAGNOSIS — Z23 NEED FOR IMMUNIZATION AGAINST INFLUENZA: ICD-10-CM

## 2017-10-13 PROCEDURE — 90471 IMMUNIZATION ADMIN: CPT | Performed by: FAMILY MEDICINE

## 2017-10-13 PROCEDURE — 90686 IIV4 VACC NO PRSV 0.5 ML IM: CPT | Performed by: FAMILY MEDICINE

## 2017-10-13 NOTE — PROGRESS NOTES
"Gerri Castelan is a 61 y.o. female here for a non-provider visit for:   FLU    Reason for immunization: Annual Flu Vaccine  Immunization records indicate need for vaccine: Yes, confirmed with Epic  Minimum interval has been met for this vaccine: Yes  ABN completed: Yes    Order and dose verified by: ANewfelt  VIS Dated   was given to patient: Yes  All IAC Questionnaire questions were answered \"No.\"    Patient tolerated injection and no adverse effects were observed or reported: Yes    Pt scheduled for next dose in series: Not Indicated  "

## 2017-12-13 ENCOUNTER — OFFICE VISIT (OUTPATIENT)
Dept: OTHER | Facility: IMAGING CENTER | Age: 62
End: 2017-12-13
Payer: COMMERCIAL

## 2017-12-13 DIAGNOSIS — G89.29 CHRONIC RIGHT SHOULDER PAIN: ICD-10-CM

## 2017-12-13 DIAGNOSIS — G43.709 CHRONIC MIGRAINE WITHOUT AURA WITHOUT STATUS MIGRAINOSUS, NOT INTRACTABLE: ICD-10-CM

## 2017-12-13 DIAGNOSIS — M25.511 CHRONIC RIGHT SHOULDER PAIN: ICD-10-CM

## 2017-12-13 PROCEDURE — 97811 ACUP 1/> W/O ESTIM EA ADD 15: CPT | Performed by: FAMILY MEDICINE

## 2017-12-13 PROCEDURE — 99999 PR NO CHARGE: CPT | Mod: 25 | Performed by: FAMILY MEDICINE

## 2017-12-13 PROCEDURE — 97813 ACUP 1/> W/ESTIM 1ST 15 MIN: CPT | Performed by: FAMILY MEDICINE

## 2017-12-13 NOTE — PATIENT INSTRUCTIONS
Have encouraged the patient to rest after the acupuncture session today - taking naps or going to sleep early as necessary.  Increase intake of water and refrain from strenuous activities.  Patient may expect to feel transient worsening of symptoms, but this should resolve to benefit in the next day or two after treatment.    The side effects of Acupuncture needle insertion include: minor bruising, bleeding, or pain at the site of needle insertion.  If more worrisome symptoms, such as continued bleeding, severe bruising, or continue pain or altered sensation persist, please contact [facility] office @ [phone]

## 2017-12-13 NOTE — PROGRESS NOTES
[facility]uncture Progress Note  [address] [place] [address]  Dept: [phone]      Patient Name: Gerri Castelan   MRN: [id number]  YOB: 1955  PCP: Natacha Murillo M.D.  Date of Service: 12/13/2017  2:32 PM    CC Migraine, Loose BM with urgency   HPI Patient is a 59 yo  female with chronic loss stool after her GB removal.  She has been also having right shoulder tightness after her procedure of melanoma removal.  She would like her GERD treated as well.  Since last tx she has been having no right shoulder tightness.  She had more left sided frontal headache in the last few weeks.  BM has been normal.   ROS Birthplace: Not asked.  Color:  Season:  -handed  Scars:   PMH Past Medical History:   Diagnosis Date   • Carcinoma of breast (CMS-HCC) 1/2011    diagnosed at 55, no chemo required due to oncotype   • Dyslipidemia 10/2010   • ESOPHAGITIS    • Family history of early CAD 10/17/2016   • gallbladder polyp 1/2011   • GERD (gastroesophageal reflux disease)    • Malignant melanoma (CMS-HCC) 12/1983    trunk   • Migraine    • S/P bilateral mastectomy 3/2011    breast reconstruction and then revision     Past Surgical History:   Procedure Laterality Date   • ANAIS BY LAPAROSCOPY  12/6/2012    Performed by Yessica Ocampo M.D. at SURGERY [facility]   • NIPPLE RECONSTRUCTION  12/1/2011    Performed by PATEL DALTON at [facility]   • FLAP GRAFT  12/1/2011    Performed by PATEL DALTON at [facility] ORS   • BREAST RECONSTRUCTION  7/15/2011    Performed by PATEL DALTON at [facility]   • TISSUE EXPANDER PLACE/REMOVE  7/15/2011    Performed by PATEL DALTON at [facility]   • BREAST IMPLANT REVISION  7/15/2011    Performed by PATEL DALTON at [facility]   • CAPSULECTOMY  7/15/2011    Performed by PATEL DALTON at [facility]   • TISSUE TRANSFER/REARRANGE   7/15/2011    Performed by PATEL DALTON at SURGERY [facility] ORS   • MASTECTOMY  3/29/2011    Performed by HANNAH LEVIN at SURGERY [facility] ORS   • BREAST RECONSTRUCTION  3/29/2011    Performed by PATEL DALTON at SURGERY [facility] ORS   • TISSUE EXPANDER PLACE/REMOVE  3/29/2011    Performed by PATEL DALTON at SURGERY [facility] ORS   • BREAST BIOPSY  2/24/2011    Performed by HANNAH LEVIN at SURGERY SAME DAY [facility] ORS   • LUMPECTOMY  1/19/2011    Performed by HANNAH LEVIN at SURGERY SAME DAY [facility] ORS   • NODE BIOPSY SENTINEL  1/19/2011    Performed by HANNAH LEVIN at SURGERY SAME DAY [facility] ORS   • COLONOSCOPY  2/2005    normal, due 2015   • OTHER ORTHOPEDIC SURGERY  1997    knee surgery ACL repair   • TUBAL LIGATION  1987   • OTHER  1983    malignant melanoma skin cancer upper back      SH Social History     Social History   • Marital status:      Spouse name: N/A   • Number of children: N/A   • Years of education: N/A     Social History Main Topics   • Smoking status: Never Smoker   • Smokeless tobacco: Never Used   • Alcohol use 0.6 oz/week     1 Standard drinks or equivalent per week      Comment: rarely   • Drug use: No   • Sexual activity: Yes     Partners: Male     Other Topics Concern   • Not on file     Social History Narrative   • No narrative on file      MEDS Current Outpatient Prescriptions on File Prior to Visit   Medication Sig Dispense Refill   • cholestyramine (QUESTRAN) 4 G packet Take 4 g by mouth every day. 90 Each 3   • levothyroxine (SYNTHROID) 50 MCG Tab Take 1 Tab by mouth every morning before breakfast. 90 Tab 3   • atorvastatin (LIPITOR) 20 MG Tab Take 1 Tab by mouth every bedtime. 90 Tab 3   • ranitidine (ZANTAC) 150 MG Tab Take 150 mg by mouth 2 times a day.     • aspirin EC (ECOTRIN) 81 MG Tablet Delayed Response Take 1 Tab by mouth every day. 30 Tab 0   • Probiotic Product (PROBIOTIC DAILY PO) Take 1 Cap by mouth every day.       No current  facility-administered medications on file prior to visit.       ALLERGIES Allergies   Allergen Reactions   • Arimidex [Anastrozole] Swelling      PE Marguerite Exam: Stomach Qi: (+) pecking radial pulse  (+) Oketsu, (+) Immune,   (L) Adrenal - B/LKid16 St9 DaiMai ASIS Kid2         Assessment Eastern Liver/blood stagnation, Stomach qi imbalance, Immune imbalance, Adrenal exhaustion.    Western Encounter Diagnoses   Name Primary?   • Chronic migraine without aura without status migrainosus, not intractable    • Chronic right shoulder pain                   Plan Set 1: Left (Lv4, Lu5), B/L LI10-11 area  Set 2: R Kd3, 7, 9, 10, 27  Set 3: L (LI 4 --> LI 10 TW 5, SI 7 --> SI 9, SP 6 ++> SP 9, LR 4.5 --> LR 8), R (BOSSMAN 7--> BOSSMAN 5, PC 5, ST 39 ++> ST 36, GB 39 --> GB 34, Ht 5)  Set 4: R Er pung 1 syd, L Er pung 2 syd, Upper feeling area 2/5   Total face to face time was 20 minutes with more than 15 minutes were spent discussing with the patient about her condition which did not include procedure time. >50% of the face to face time was spent in counseling and coordination. Topics discussed included:   Continue mindfulness based stress management twice daily as she gets back to her routine.  Continue her increase her dietary source of fiber or use psyllium but avoid fiber supplement as inadequate water intake can exacerbate her situation.  Continue daily of prime tea with her diet choice.  Total acupuncture treatment time = 45 minutes    Chema Hopkins D.O.

## 2017-12-18 DIAGNOSIS — E78.5 DYSLIPIDEMIA: ICD-10-CM

## 2017-12-18 RX ORDER — ATORVASTATIN CALCIUM 20 MG/1
20 TABLET, FILM COATED ORAL
Qty: 90 TAB | Refills: 0 | Status: SHIPPED | OUTPATIENT
Start: 2017-12-18 | End: 2018-03-05 | Stop reason: SDUPTHER

## 2018-01-08 ENCOUNTER — NON-PROVIDER VISIT (OUTPATIENT)
Dept: MEDICAL GROUP | Facility: CLINIC | Age: 63
End: 2018-01-08
Payer: COMMERCIAL

## 2018-01-08 DIAGNOSIS — Z23 NEED FOR HEPATITIS A IMMUNIZATION: ICD-10-CM

## 2018-01-08 PROCEDURE — 90471 IMMUNIZATION ADMIN: CPT | Performed by: FAMILY MEDICINE

## 2018-01-08 PROCEDURE — 90632 HEPA VACCINE ADULT IM: CPT | Performed by: FAMILY MEDICINE

## 2018-02-06 ENCOUNTER — OFFICE VISIT (OUTPATIENT)
Dept: MEDICAL GROUP | Facility: CLINIC | Age: 63
End: 2018-02-06
Payer: COMMERCIAL

## 2018-02-06 VITALS
WEIGHT: 169 LBS | HEIGHT: 65 IN | TEMPERATURE: 99 F | SYSTOLIC BLOOD PRESSURE: 104 MMHG | DIASTOLIC BLOOD PRESSURE: 66 MMHG | OXYGEN SATURATION: 93 % | RESPIRATION RATE: 16 BRPM | BODY MASS INDEX: 28.16 KG/M2 | HEART RATE: 69 BPM

## 2018-02-06 DIAGNOSIS — E03.9 IDIOPATHIC HYPOTHYROIDISM: ICD-10-CM

## 2018-02-06 DIAGNOSIS — K21.9 GASTROESOPHAGEAL REFLUX DISEASE WITHOUT ESOPHAGITIS: ICD-10-CM

## 2018-02-06 DIAGNOSIS — R73.01 ELEVATED FASTING GLUCOSE: ICD-10-CM

## 2018-02-06 DIAGNOSIS — E78.5 DYSLIPIDEMIA: ICD-10-CM

## 2018-02-06 DIAGNOSIS — S46.912A STRAIN OF LEFT SHOULDER, INITIAL ENCOUNTER: ICD-10-CM

## 2018-02-06 PROCEDURE — 99214 OFFICE O/P EST MOD 30 MIN: CPT | Performed by: FAMILY MEDICINE

## 2018-02-06 ASSESSMENT — PATIENT HEALTH QUESTIONNAIRE - PHQ9: CLINICAL INTERPRETATION OF PHQ2 SCORE: 0

## 2018-02-07 NOTE — PROGRESS NOTES
Chief Complaint   Patient presents with   • Shoulder Pain     left shoulder pain x 3 weeks   • Gastrophageal Reflux   • Hyperlipidemia   • Hypothyroidism       Subjective:     HPI:   Gerri Castelan presents today with the followin. Strain of left shoulder, initial encounter  She has been exercising at the gym a lot more. She is not aware of a particular injury but she was doing more with weights. Her left shoulder has been a little stiff and tender. Range of motion overhead and reaching is particularly painful. This is particularly true anteriorly. However, on exam there was no bicipital notch tenderness. Range of motion is limited overhead. She does feel this is finally improving in the last couple of days. She will let me know in a week or 2 if this is not better and we will proceed with PT and possibly imaging.     2. Gastroesophageal reflux disease without esophagitis  She feels the current medication regimen ranitidine is controlling the gastroesophageal reflux symptoms well. Denies dysphagia, reflux symptoms, acidity, abdominal pain or visible blood or mucus in the stool. Denies vomiting or hematemesis. Denies burping or abdominal bloating. Patient avoids nonsteroidal anti-inflammatory drugs. Avoids heavy meals or eating within 2 hours of bedtime. Lab orders discussed and placed.     3. Dyslipidemia  Patient denies chest pain, chest pressure, palpitations or exertional shortness of breath. Patient denies muscle aches or muscle weakness from the atorvastatin medication. Patient is a never smoker. Patient takes 81 mg aspirin daily. Patient has no history of myocardial infarction, stroke or PVD.  The problem is clinically stable.  Lab orders discussed and placed.    4. Idiopathic hypothyroidism  Patient reports good energy level on the medication. Patient denies insomnia, tremor or change in appetite.  Patient is taking the medication on an empty stomach in the morning and waiting at least 30  "minutes before eating.  Last TSH in September 2016 was at target. She is overdue for recheck, lab orders discussed and placed.    5. Elevated fasting glucose  At one point a year ago her glucose was mildly elevated. She does need a follow-up. Glycohemoglobin last year was normal. Her brother does have diabetes.      Patient Active Problem List    Diagnosis Date Noted   • Elevated fasting glucose 12/30/2016   • Abnormal echocardiogram 11/17/2016   • Family history of early CAD 10/17/2016   • History of TIA (transient ischemic attack) 10/03/2016   • Dyslipidemia 09/26/2016   • Idiopathic hypothyroidism 09/26/2016   • Chronic right shoulder pain 09/22/2016   • Frequent loose stools 09/22/2016   • Chronic migraine without aura without status migrainosus, not intractable    • Melanoma (CMS-HCC) 02/02/2011   • Personal history of breast cancer    • Family history of breast cancer in mother 12/01/2010   • GERD (gastroesophageal reflux disease) 05/19/2009       Current medicines (including changes today)  Current Outpatient Prescriptions   Medication Sig Dispense Refill   • atorvastatin (LIPITOR) 20 MG Tab Take 1 Tab by mouth every bedtime. 90 Tab 0   • cholestyramine (QUESTRAN) 4 G packet Take 4 g by mouth every day. 90 Each 3   • levothyroxine (SYNTHROID) 50 MCG Tab Take 1 Tab by mouth every morning before breakfast. 90 Tab 3   • ranitidine (ZANTAC) 150 MG Tab Take 150 mg by mouth 2 times a day.     • aspirin EC (ECOTRIN) 81 MG Tablet Delayed Response Take 1 Tab by mouth every day. 30 Tab 0   • Probiotic Product (PROBIOTIC DAILY PO) Take 1 Cap by mouth every day.       No current facility-administered medications for this visit.        Allergies   Allergen Reactions   • Arimidex [Anastrozole] Swelling       ROS: As per HPI       Objective:     Blood pressure 104/66, pulse 69, temperature 37.2 °C (99 °F), resp. rate 16, height 1.651 m (5' 5\"), weight 76.7 kg (169 lb), last menstrual period 10/30/2004, SpO2 93 %. Body mass " index is 28.12 kg/m².    Physical Exam:  Constitutional: Well-developed and well-nourished. Not diaphoretic. No distress. Lucid and fluent.  Skin: Skin is warm and dry. No rash noted.  Head: Atraumatic without lesions.  Eyes: Conjunctivae and extraocular motions are normal. Pupils are equal, round, and reactive to light. No scleral icterus.   Mouth/Throat: Tongue normal. Oropharynx is clear and moist. Posterior pharynx without erythema or exudates.  Neck: Supple, trachea midline. No thyromegaly present. No cervical or supraclavicular lymphadenopathy. No JVD or carotid bruits appreciated  Cardiovascular: Regular rate and rhythm.  Normal S1, S2 without murmur appreciated.  Chest: Effort normal. Clear to auscultation throughout. No adventitious sounds.   Abdomen: Soft, non tender, and without distention. Active bowel sounds in all four quadrants. No rebound, guarding, masses or hepatosplenomegaly.  Extremities: No cyanosis, clubbing, erythema, nor edema.  Left shoulder ROM is mildly limited, especially overhead.  Right shoulder good ROm.  Neurological: Alert and oriented x 3.   Psychiatric:  Behavior, mood, and affect are appropriate.       Assessment and Plan:     62 y.o. female with the following issues:    1. Strain of left shoulder, initial encounter     2. Gastroesophageal reflux disease without esophagitis  COMP METABOLIC PANEL    CBC WITH DIFFERENTIAL   3. Dyslipidemia  COMP METABOLIC PANEL    LIPID PROFILE   4. Idiopathic hypothyroidism  TSH   5. Elevated fasting glucose  HEMOGLOBIN A1C         Followup: Return in about 6 months (around 8/6/2018), or if symptoms worsen or fail to improve.

## 2018-02-09 ENCOUNTER — APPOINTMENT (OUTPATIENT)
Dept: OTHER | Facility: IMAGING CENTER | Age: 63
End: 2018-02-09

## 2018-02-12 ENCOUNTER — HOSPITAL ENCOUNTER (OUTPATIENT)
Dept: LAB | Facility: MEDICAL CENTER | Age: 63
End: 2018-02-12
Attending: FAMILY MEDICINE
Payer: COMMERCIAL

## 2018-02-12 DIAGNOSIS — E03.9 IDIOPATHIC HYPOTHYROIDISM: ICD-10-CM

## 2018-02-12 DIAGNOSIS — R73.01 ELEVATED FASTING GLUCOSE: ICD-10-CM

## 2018-02-12 DIAGNOSIS — E78.5 DYSLIPIDEMIA: ICD-10-CM

## 2018-02-12 DIAGNOSIS — K21.9 GASTROESOPHAGEAL REFLUX DISEASE WITHOUT ESOPHAGITIS: ICD-10-CM

## 2018-02-12 LAB
ALBUMIN SERPL BCP-MCNC: 4.2 G/DL (ref 3.2–4.9)
ALBUMIN/GLOB SERPL: 1.6 G/DL
ALP SERPL-CCNC: 66 U/L (ref 30–99)
ALT SERPL-CCNC: 18 U/L (ref 2–50)
ANION GAP SERPL CALC-SCNC: 7 MMOL/L (ref 0–11.9)
AST SERPL-CCNC: 14 U/L (ref 12–45)
BASOPHILS # BLD AUTO: 1.1 % (ref 0–1.8)
BASOPHILS # BLD: 0.06 K/UL (ref 0–0.12)
BILIRUB SERPL-MCNC: 0.6 MG/DL (ref 0.1–1.5)
BUN SERPL-MCNC: 21 MG/DL (ref 8–22)
CALCIUM SERPL-MCNC: 9.7 MG/DL (ref 8.5–10.5)
CHLORIDE SERPL-SCNC: 107 MMOL/L (ref 96–112)
CHOLEST SERPL-MCNC: 167 MG/DL (ref 100–199)
CO2 SERPL-SCNC: 27 MMOL/L (ref 20–33)
CREAT SERPL-MCNC: 0.8 MG/DL (ref 0.5–1.4)
EOSINOPHIL # BLD AUTO: 0.09 K/UL (ref 0–0.51)
EOSINOPHIL NFR BLD: 1.6 % (ref 0–6.9)
ERYTHROCYTE [DISTWIDTH] IN BLOOD BY AUTOMATED COUNT: 45.2 FL (ref 35.9–50)
EST. AVERAGE GLUCOSE BLD GHB EST-MCNC: 120 MG/DL
GLOBULIN SER CALC-MCNC: 2.6 G/DL (ref 1.9–3.5)
GLUCOSE SERPL-MCNC: 95 MG/DL (ref 65–99)
HBA1C MFR BLD: 5.8 % (ref 0–5.6)
HCT VFR BLD AUTO: 45 % (ref 37–47)
HDLC SERPL-MCNC: 53 MG/DL
HGB BLD-MCNC: 15 G/DL (ref 12–16)
IMM GRANULOCYTES # BLD AUTO: 0.01 K/UL (ref 0–0.11)
IMM GRANULOCYTES NFR BLD AUTO: 0.2 % (ref 0–0.9)
LDLC SERPL CALC-MCNC: 90 MG/DL
LYMPHOCYTES # BLD AUTO: 1.47 K/UL (ref 1–4.8)
LYMPHOCYTES NFR BLD: 26.6 % (ref 22–41)
MCH RBC QN AUTO: 31.6 PG (ref 27–33)
MCHC RBC AUTO-ENTMCNC: 33.3 G/DL (ref 33.6–35)
MCV RBC AUTO: 94.9 FL (ref 81.4–97.8)
MONOCYTES # BLD AUTO: 0.45 K/UL (ref 0–0.85)
MONOCYTES NFR BLD AUTO: 8.2 % (ref 0–13.4)
NEUTROPHILS # BLD AUTO: 3.44 K/UL (ref 2–7.15)
NEUTROPHILS NFR BLD: 62.3 % (ref 44–72)
NRBC # BLD AUTO: 0 K/UL
NRBC BLD-RTO: 0 /100 WBC
PLATELET # BLD AUTO: 173 K/UL (ref 164–446)
PMV BLD AUTO: 12.4 FL (ref 9–12.9)
POTASSIUM SERPL-SCNC: 4.5 MMOL/L (ref 3.6–5.5)
PROT SERPL-MCNC: 6.8 G/DL (ref 6–8.2)
RBC # BLD AUTO: 4.74 M/UL (ref 4.2–5.4)
SODIUM SERPL-SCNC: 141 MMOL/L (ref 135–145)
TRIGL SERPL-MCNC: 122 MG/DL (ref 0–149)
TSH SERPL DL<=0.005 MIU/L-ACNC: 1.46 UIU/ML (ref 0.38–5.33)
WBC # BLD AUTO: 5.5 K/UL (ref 4.8–10.8)

## 2018-02-12 PROCEDURE — 36415 COLL VENOUS BLD VENIPUNCTURE: CPT

## 2018-02-12 PROCEDURE — 80061 LIPID PANEL: CPT

## 2018-02-12 PROCEDURE — 83036 HEMOGLOBIN GLYCOSYLATED A1C: CPT

## 2018-02-12 PROCEDURE — 80053 COMPREHEN METABOLIC PANEL: CPT

## 2018-02-12 PROCEDURE — 85025 COMPLETE CBC W/AUTO DIFF WBC: CPT

## 2018-02-12 PROCEDURE — 84443 ASSAY THYROID STIM HORMONE: CPT

## 2018-03-05 ENCOUNTER — OFFICE VISIT (OUTPATIENT)
Dept: MEDICAL GROUP | Facility: CLINIC | Age: 63
End: 2018-03-05
Payer: COMMERCIAL

## 2018-03-05 VITALS
HEART RATE: 83 BPM | HEIGHT: 65 IN | BODY MASS INDEX: 28.82 KG/M2 | OXYGEN SATURATION: 94 % | RESPIRATION RATE: 16 BRPM | TEMPERATURE: 98.2 F | SYSTOLIC BLOOD PRESSURE: 108 MMHG | WEIGHT: 173 LBS | DIASTOLIC BLOOD PRESSURE: 60 MMHG

## 2018-03-05 DIAGNOSIS — E78.5 DYSLIPIDEMIA: ICD-10-CM

## 2018-03-05 DIAGNOSIS — J20.9 ACUTE BRONCHITIS, UNSPECIFIED ORGANISM: ICD-10-CM

## 2018-03-05 DIAGNOSIS — R79.89 ELEVATED TSH: ICD-10-CM

## 2018-03-05 PROCEDURE — 99213 OFFICE O/P EST LOW 20 MIN: CPT | Performed by: FAMILY MEDICINE

## 2018-03-05 RX ORDER — AZITHROMYCIN 250 MG/1
TABLET, FILM COATED ORAL
Qty: 6 TAB | Refills: 0 | Status: ON HOLD | OUTPATIENT
Start: 2018-03-05 | End: 2018-09-15

## 2018-03-05 RX ORDER — AZITHROMYCIN 250 MG/1
TABLET, FILM COATED ORAL
Qty: 6 TAB | Refills: 0 | Status: SHIPPED | OUTPATIENT
Start: 2018-03-05 | End: 2018-03-05 | Stop reason: SDUPTHER

## 2018-03-05 RX ORDER — LEVOTHYROXINE SODIUM 0.05 MG/1
50 TABLET ORAL
Qty: 90 TAB | Refills: 3 | Status: SHIPPED | OUTPATIENT
Start: 2018-03-05 | End: 2019-02-04 | Stop reason: SDUPTHER

## 2018-03-05 RX ORDER — ATORVASTATIN CALCIUM 20 MG/1
20 TABLET, FILM COATED ORAL
Qty: 90 TAB | Refills: 3 | Status: SHIPPED | OUTPATIENT
Start: 2018-03-05 | End: 2019-02-04 | Stop reason: SDUPTHER

## 2018-03-05 ASSESSMENT — PAIN SCALES - GENERAL: PAINLEVEL: 5=MODERATE PAIN

## 2018-03-05 NOTE — PROGRESS NOTES
Chief Complaint   Patient presents with   • Cough   • Hyperlipidemia   • Hypothyroidism       Subjective:     HPI:   Gerri Castelan presents today with the followin. Dyslipidemia  Patient denies chest pain, chest pressure, palpitations or exertional shortness of breath. Patient denies muscle aches or muscle weakness from the atorvastatin medication. Patient is a neversmoker. Patient takes 81 mg aspirin daily. Patient has no history of myocardial infarction, stroke or PVD.  The problem is clinically stable.    2. Elevated TSH  Patient reports good energy level on the medication. Patient denies insomnia, tremor or change in appetite.  Patient is taking the medication on an empty stomach in the morning and waiting at least 30 minutes before eating.  Last TSH in February was at target.    3. Acute bronchitis, unspecified organism  Persistent cough and congestion for 9 days.  She has heard a couple of wheezes.  Noise seemed to be in her back.  Discussed a z-hansel.  She is SOB with exertion.  Denies fever.  Having headaches and congestion.  Phlegm is clear to green.        Patient Active Problem List    Diagnosis Date Noted   • Elevated fasting glucose 2016   • Abnormal echocardiogram 2016   • Family history of early CAD 10/17/2016   • History of TIA (transient ischemic attack) 10/03/2016   • Dyslipidemia 2016   • Idiopathic hypothyroidism 2016   • Chronic right shoulder pain 2016   • Frequent loose stools 2016   • Chronic migraine without aura without status migrainosus, not intractable    • Melanoma (CMS-HCC) 2011   • Personal history of breast cancer    • Family history of breast cancer in mother 2010   • GERD (gastroesophageal reflux disease) 2009       Current medicines (including changes today)  Current Outpatient Prescriptions   Medication Sig Dispense Refill   • atorvastatin (LIPITOR) 20 MG Tab Take 1 Tab by mouth every bedtime. 90 Tab 3   •  "levothyroxine (SYNTHROID) 50 MCG Tab Take 1 Tab by mouth every morning before breakfast. 90 Tab 3   • azithromycin (ZITHROMAX) 250 MG Tab As directed on pack 6 Tab 0   • cholestyramine (QUESTRAN) 4 G packet Take 4 g by mouth every day. 90 Each 3   • ranitidine (ZANTAC) 150 MG Tab Take 150 mg by mouth 2 times a day.     • aspirin EC (ECOTRIN) 81 MG Tablet Delayed Response Take 1 Tab by mouth every day. 30 Tab 0   • Probiotic Product (PROBIOTIC DAILY PO) Take 1 Cap by mouth every day.       No current facility-administered medications for this visit.        Allergies   Allergen Reactions   • Arimidex [Anastrozole] Swelling       ROS: As per HPI       Objective:     Blood pressure 108/60, pulse 83, temperature 36.8 °C (98.2 °F), resp. rate 16, height 1.651 m (5' 5\"), weight 78.5 kg (173 lb), last menstrual period 10/30/2004, SpO2 94 %, not currently breastfeeding. Body mass index is 28.79 kg/m².    Physical Exam:  Constitutional: Well-developed and well-nourished. Not diaphoretic. No distress. Lucid and fluent.  Skin: Skin is warm and dry. No rash noted.  Head: Atraumatic without lesions.  Eyes: Conjunctivae and extraocular motions are normal. Pupils are equal, round, and reactive to light. No scleral icterus.   Mouth/Throat: Tongue normal. Oropharynx is clear and moist. Posterior pharynx without erythema or exudates.  Neck: Supple, trachea midline. No thyromegaly present. No cervical or supraclavicular lymphadenopathy. No JVD or carotid bruits appreciated  Cardiovascular: Regular rate and rhythm.  Normal S1, S2 without murmur appreciated.  Chest: Effort normal. Wet rhonchi all lung fields, no rales appreciated.  Extremities: No cyanosis, clubbing, erythema, nor edema.   Neurological: Alert and oriented x 3.   Psychiatric:  Behavior, mood, and affect are appropriate.    February labs are reviewed.  TSH is good on current dose.  CMP, lipids normal.  CBC normal.  Hba1c mildly elevated.       Assessment and Plan:     62 " y.o. female with the following issues:    1. Dyslipidemia  atorvastatin (LIPITOR) 20 MG Tab   2. Elevated TSH  levothyroxine (SYNTHROID) 50 MCG Tab   3. Acute bronchitis, unspecified organism  azithromycin (ZITHROMAX) 250 MG Tab    DISCONTINUED: azithromycin (ZITHROMAX) 250 MG Tab         Followup: Return in about 3 months (around 6/5/2018), or if symptoms worsen or fail to improve.

## 2018-03-22 ENCOUNTER — PATIENT MESSAGE (OUTPATIENT)
Dept: MEDICAL GROUP | Facility: CLINIC | Age: 63
End: 2018-03-22

## 2018-03-22 DIAGNOSIS — D22.10 NEVUS OF EYELID, RIGHT: ICD-10-CM

## 2018-04-27 DIAGNOSIS — R68.82 DIMINISHED LIBIDO: ICD-10-CM

## 2018-06-20 ENCOUNTER — PATIENT MESSAGE (OUTPATIENT)
Dept: MEDICAL GROUP | Facility: CLINIC | Age: 63
End: 2018-06-20

## 2018-09-12 ENCOUNTER — HOSPITAL ENCOUNTER (OUTPATIENT)
Facility: MEDICAL CENTER | Age: 63
End: 2018-09-15
Attending: EMERGENCY MEDICINE | Admitting: HOSPITALIST
Payer: COMMERCIAL

## 2018-09-12 ENCOUNTER — APPOINTMENT (OUTPATIENT)
Dept: RADIOLOGY | Facility: MEDICAL CENTER | Age: 63
End: 2018-09-12
Attending: EMERGENCY MEDICINE
Payer: COMMERCIAL

## 2018-09-12 DIAGNOSIS — R41.82 ALTERED MENTAL STATUS, UNSPECIFIED ALTERED MENTAL STATUS TYPE: ICD-10-CM

## 2018-09-12 LAB
ALBUMIN SERPL BCP-MCNC: 4.3 G/DL (ref 3.2–4.9)
ALBUMIN/GLOB SERPL: 1.7 G/DL
ALP SERPL-CCNC: 63 U/L (ref 30–99)
ALT SERPL-CCNC: 21 U/L (ref 2–50)
ANION GAP SERPL CALC-SCNC: 8 MMOL/L (ref 0–11.9)
APPEARANCE UR: CLEAR
AST SERPL-CCNC: 17 U/L (ref 12–45)
BACTERIA #/AREA URNS HPF: NEGATIVE /HPF
BASOPHILS # BLD AUTO: 0.5 % (ref 0–1.8)
BASOPHILS # BLD: 0.04 K/UL (ref 0–0.12)
BILIRUB SERPL-MCNC: 0.5 MG/DL (ref 0.1–1.5)
BILIRUB UR QL STRIP.AUTO: NEGATIVE
BUN SERPL-MCNC: 21 MG/DL (ref 8–22)
CALCIUM SERPL-MCNC: 9.4 MG/DL (ref 8.5–10.5)
CHLORIDE SERPL-SCNC: 109 MMOL/L (ref 96–112)
CO2 SERPL-SCNC: 22 MMOL/L (ref 20–33)
COLOR UR: YELLOW
CREAT SERPL-MCNC: 0.7 MG/DL (ref 0.5–1.4)
EKG IMPRESSION: NORMAL
EOSINOPHIL # BLD AUTO: 0.01 K/UL (ref 0–0.51)
EOSINOPHIL NFR BLD: 0.1 % (ref 0–6.9)
EPI CELLS #/AREA URNS HPF: ABNORMAL /HPF
ERYTHROCYTE [DISTWIDTH] IN BLOOD BY AUTOMATED COUNT: 42.8 FL (ref 35.9–50)
GLOBULIN SER CALC-MCNC: 2.5 G/DL (ref 1.9–3.5)
GLUCOSE SERPL-MCNC: 102 MG/DL (ref 65–99)
GLUCOSE UR STRIP.AUTO-MCNC: NEGATIVE MG/DL
HCT VFR BLD AUTO: 38.5 % (ref 37–47)
HGB BLD-MCNC: 13.2 G/DL (ref 12–16)
HYALINE CASTS #/AREA URNS LPF: ABNORMAL /LPF
IMM GRANULOCYTES # BLD AUTO: 0.03 K/UL (ref 0–0.11)
IMM GRANULOCYTES NFR BLD AUTO: 0.4 % (ref 0–0.9)
KETONES UR STRIP.AUTO-MCNC: NEGATIVE MG/DL
LEUKOCYTE ESTERASE UR QL STRIP.AUTO: ABNORMAL
LYMPHOCYTES # BLD AUTO: 0.95 K/UL (ref 1–4.8)
LYMPHOCYTES NFR BLD: 11.8 % (ref 22–41)
MCH RBC QN AUTO: 31.3 PG (ref 27–33)
MCHC RBC AUTO-ENTMCNC: 34.3 G/DL (ref 33.6–35)
MCV RBC AUTO: 91.2 FL (ref 81.4–97.8)
MICRO URNS: ABNORMAL
MONOCYTES # BLD AUTO: 0.34 K/UL (ref 0–0.85)
MONOCYTES NFR BLD AUTO: 4.2 % (ref 0–13.4)
NEUTROPHILS # BLD AUTO: 6.65 K/UL (ref 2–7.15)
NEUTROPHILS NFR BLD: 83 % (ref 44–72)
NITRITE UR QL STRIP.AUTO: NEGATIVE
NRBC # BLD AUTO: 0 K/UL
NRBC BLD-RTO: 0 /100 WBC
PH UR STRIP.AUTO: 5 [PH]
PLATELET # BLD AUTO: 171 K/UL (ref 164–446)
PMV BLD AUTO: 11.5 FL (ref 9–12.9)
POTASSIUM SERPL-SCNC: 3.8 MMOL/L (ref 3.6–5.5)
PROT SERPL-MCNC: 6.8 G/DL (ref 6–8.2)
PROT UR QL STRIP: NEGATIVE MG/DL
RBC # BLD AUTO: 4.22 M/UL (ref 4.2–5.4)
RBC # URNS HPF: ABNORMAL /HPF
RBC UR QL AUTO: ABNORMAL
SODIUM SERPL-SCNC: 139 MMOL/L (ref 135–145)
SP GR UR STRIP.AUTO: 1.02
TROPONIN I SERPL-MCNC: 0.03 NG/ML (ref 0–0.04)
UROBILINOGEN UR STRIP.AUTO-MCNC: 0.2 MG/DL
WBC # BLD AUTO: 8 K/UL (ref 4.8–10.8)
WBC #/AREA URNS HPF: ABNORMAL /HPF

## 2018-09-12 PROCEDURE — 81001 URINALYSIS AUTO W/SCOPE: CPT

## 2018-09-12 PROCEDURE — 99285 EMERGENCY DEPT VISIT HI MDM: CPT

## 2018-09-12 PROCEDURE — 84484 ASSAY OF TROPONIN QUANT: CPT

## 2018-09-12 PROCEDURE — 85025 COMPLETE CBC W/AUTO DIFF WBC: CPT

## 2018-09-12 PROCEDURE — 70450 CT HEAD/BRAIN W/O DYE: CPT

## 2018-09-12 PROCEDURE — G0378 HOSPITAL OBSERVATION PER HR: HCPCS

## 2018-09-12 PROCEDURE — 36415 COLL VENOUS BLD VENIPUNCTURE: CPT

## 2018-09-12 PROCEDURE — 80053 COMPREHEN METABOLIC PANEL: CPT

## 2018-09-12 PROCEDURE — 93005 ELECTROCARDIOGRAM TRACING: CPT | Performed by: EMERGENCY MEDICINE

## 2018-09-12 PROCEDURE — 99220 PR INITIAL OBSERVATION CARE,LEVL III: CPT | Performed by: HOSPITALIST

## 2018-09-13 ENCOUNTER — APPOINTMENT (OUTPATIENT)
Dept: RADIOLOGY | Facility: MEDICAL CENTER | Age: 63
End: 2018-09-13
Attending: HOSPITALIST
Payer: COMMERCIAL

## 2018-09-13 PROBLEM — E03.9 HYPOTHYROIDISM: Status: ACTIVE | Noted: 2018-09-13

## 2018-09-13 PROBLEM — R41.3 AMNESIA: Status: ACTIVE | Noted: 2018-09-13

## 2018-09-13 PROCEDURE — 70551 MRI BRAIN STEM W/O DYE: CPT

## 2018-09-13 PROCEDURE — A9270 NON-COVERED ITEM OR SERVICE: HCPCS | Performed by: HOSPITALIST

## 2018-09-13 PROCEDURE — 99225 PR SUBSEQUENT OBSERVATION CARE,LEVEL II: CPT | Performed by: INTERNAL MEDICINE

## 2018-09-13 PROCEDURE — 95819 EEG AWAKE AND ASLEEP: CPT

## 2018-09-13 PROCEDURE — G0378 HOSPITAL OBSERVATION PER HR: HCPCS

## 2018-09-13 PROCEDURE — 96374 THER/PROPH/DIAG INJ IV PUSH: CPT

## 2018-09-13 PROCEDURE — 700111 HCHG RX REV CODE 636 W/ 250 OVERRIDE (IP): Performed by: INTERNAL MEDICINE

## 2018-09-13 PROCEDURE — 700102 HCHG RX REV CODE 250 W/ 637 OVERRIDE(OP): Performed by: HOSPITALIST

## 2018-09-13 RX ORDER — ATORVASTATIN CALCIUM 20 MG/1
20 TABLET, FILM COATED ORAL
Status: DISCONTINUED | OUTPATIENT
Start: 2018-09-13 | End: 2018-09-15 | Stop reason: HOSPADM

## 2018-09-13 RX ORDER — BISACODYL 10 MG
10 SUPPOSITORY, RECTAL RECTAL
Status: DISCONTINUED | OUTPATIENT
Start: 2018-09-13 | End: 2018-09-15 | Stop reason: HOSPADM

## 2018-09-13 RX ORDER — RANITIDINE 150 MG/1
150 TABLET ORAL 2 TIMES DAILY
Status: DISCONTINUED | OUTPATIENT
Start: 2018-09-13 | End: 2018-09-13

## 2018-09-13 RX ORDER — LORAZEPAM 2 MG/ML
1 INJECTION INTRAMUSCULAR ONCE
Status: COMPLETED | OUTPATIENT
Start: 2018-09-13 | End: 2018-09-13

## 2018-09-13 RX ORDER — AMOXICILLIN 250 MG
2 CAPSULE ORAL 2 TIMES DAILY
Status: DISCONTINUED | OUTPATIENT
Start: 2018-09-13 | End: 2018-09-15 | Stop reason: HOSPADM

## 2018-09-13 RX ORDER — POLYETHYLENE GLYCOL 3350 17 G/17G
1 POWDER, FOR SOLUTION ORAL
Status: DISCONTINUED | OUTPATIENT
Start: 2018-09-13 | End: 2018-09-15 | Stop reason: HOSPADM

## 2018-09-13 RX ORDER — FAMOTIDINE 20 MG/1
20 TABLET, FILM COATED ORAL 2 TIMES DAILY
Status: DISCONTINUED | OUTPATIENT
Start: 2018-09-13 | End: 2018-09-13

## 2018-09-13 RX ORDER — LEVOTHYROXINE SODIUM 0.05 MG/1
50 TABLET ORAL
Status: DISCONTINUED | OUTPATIENT
Start: 2018-09-13 | End: 2018-09-15 | Stop reason: HOSPADM

## 2018-09-13 RX ORDER — FAMOTIDINE 20 MG/1
20 TABLET, FILM COATED ORAL 2 TIMES DAILY
Status: DISCONTINUED | OUTPATIENT
Start: 2018-09-13 | End: 2018-09-15 | Stop reason: HOSPADM

## 2018-09-13 RX ADMIN — FAMOTIDINE 20 MG: 20 TABLET ORAL at 17:12

## 2018-09-13 RX ADMIN — LORAZEPAM 1 MG: 2 INJECTION INTRAMUSCULAR; INTRAVENOUS at 16:20

## 2018-09-13 RX ADMIN — ATORVASTATIN CALCIUM 20 MG: 20 TABLET, FILM COATED ORAL at 02:02

## 2018-09-13 RX ADMIN — LEVOTHYROXINE SODIUM 50 MCG: 50 TABLET ORAL at 05:14

## 2018-09-13 RX ADMIN — ASPIRIN 81 MG: 81 TABLET, DELAYED RELEASE ORAL at 05:14

## 2018-09-13 RX ADMIN — ATORVASTATIN CALCIUM 20 MG: 20 TABLET, FILM COATED ORAL at 21:10

## 2018-09-13 RX ADMIN — FAMOTIDINE 20 MG: 20 TABLET ORAL at 02:03

## 2018-09-13 ASSESSMENT — COGNITIVE AND FUNCTIONAL STATUS - GENERAL
DAILY ACTIVITIY SCORE: 24
SUGGESTED CMS G CODE MODIFIER MOBILITY: CH
SUGGESTED CMS G CODE MODIFIER DAILY ACTIVITY: CH
MOBILITY SCORE: 24

## 2018-09-13 ASSESSMENT — LIFESTYLE VARIABLES
AVERAGE NUMBER OF DAYS PER WEEK YOU HAVE A DRINK CONTAINING ALCOHOL: 0
ALCOHOL_USE: YES
EVER_SMOKED: NEVER
HAVE PEOPLE ANNOYED YOU BY CRITICIZING YOUR DRINKING: NO
HOW MANY TIMES IN THE PAST YEAR HAVE YOU HAD 5 OR MORE DRINKS IN A DAY: 0
ALCOHOL_USE: YES
EVER_SMOKED: NEVER
TOTAL SCORE: 0
TOTAL SCORE: 0
CONSUMPTION TOTAL: NEGATIVE
EVER FELT BAD OR GUILTY ABOUT YOUR DRINKING: NO
EVER HAD A DRINK FIRST THING IN THE MORNING TO STEADY YOUR NERVES TO GET RID OF A HANGOVER: NO
ON A TYPICAL DAY WHEN YOU DRINK ALCOHOL HOW MANY DRINKS DO YOU HAVE: 1
EVER FELT BAD OR GUILTY ABOUT YOUR DRINKING: NO
TOTAL SCORE: 0
HAVE PEOPLE ANNOYED YOU BY CRITICIZING YOUR DRINKING: NO
TOTAL SCORE: 0
TOTAL SCORE: 0
CONSUMPTION TOTAL: INCOMPLETE
HAVE YOU EVER FELT YOU SHOULD CUT DOWN ON YOUR DRINKING: NO
HOW MANY TIMES IN THE PAST YEAR HAVE YOU HAD 5 OR MORE DRINKS IN A DAY: 0
HAVE YOU EVER FELT YOU SHOULD CUT DOWN ON YOUR DRINKING: NO
ON A TYPICAL DAY WHEN YOU DRINK ALCOHOL HOW MANY DRINKS DO YOU HAVE: 1
TOTAL SCORE: 0
EVER_SMOKED: NEVER
EVER HAD A DRINK FIRST THING IN THE MORNING TO STEADY YOUR NERVES TO GET RID OF A HANGOVER: NO

## 2018-09-13 ASSESSMENT — ENCOUNTER SYMPTOMS
MEMORY LOSS: 1
NECK PAIN: 0
FEVER: 0
DEPRESSION: 0
DIARRHEA: 0
FOCAL WEAKNESS: 0
SORE THROAT: 0
DIZZINESS: 0
VOMITING: 0
ABDOMINAL PAIN: 0
BLURRED VISION: 0
HEADACHES: 0
WHEEZING: 0
TINGLING: 0
NAUSEA: 0
PHOTOPHOBIA: 0
COUGH: 0
SHORTNESS OF BREATH: 0
PALPITATIONS: 0
LOSS OF CONSCIOUSNESS: 0
CHILLS: 0
MYALGIAS: 0

## 2018-09-13 ASSESSMENT — COPD QUESTIONNAIRES
HAVE YOU SMOKED AT LEAST 100 CIGARETTES IN YOUR ENTIRE LIFE: NO/DON'T KNOW
COPD SCREENING SCORE: 2
DO YOU EVER COUGH UP ANY MUCUS OR PHLEGM?: NO/ONLY WITH OCCASIONAL COLDS OR INFECTIONS
IN THE PAST 12 MONTHS DO YOU DO LESS THAN YOU USED TO BECAUSE OF YOUR BREATHING PROBLEMS: DISAGREE/UNSURE
DURING THE PAST 4 WEEKS HOW MUCH DID YOU FEEL SHORT OF BREATH: NONE/LITTLE OF THE TIME

## 2018-09-13 ASSESSMENT — PATIENT HEALTH QUESTIONNAIRE - PHQ9
2. FEELING DOWN, DEPRESSED, IRRITABLE, OR HOPELESS: NOT AT ALL
SUM OF ALL RESPONSES TO PHQ9 QUESTIONS 1 AND 2: 0
1. LITTLE INTEREST OR PLEASURE IN DOING THINGS: NOT AT ALL

## 2018-09-13 ASSESSMENT — PAIN SCALES - GENERAL
PAINLEVEL_OUTOF10: 0

## 2018-09-13 NOTE — ED PROVIDER NOTES
ED Provider Note    Scribed for Della Cartwright M.D. by Laila Garcia. 9/12/2018, 9:29 PM.    Primary care provider: Davidson Murillo M.D.  Means of arrival: ambulance  History obtained from: patient's family  History limited by: patient's altered level of consciousness.     CHIEF COMPLAINT  Chief Complaint   Patient presents with   • ALOC     disoriented to event, time       HPI  Gerri Castelan is a 62 y.o. Female with a history of TIA who presents to the Emergency Department for evaluation of altered level of consciousness onset 6:30 PM today. Per son, patient called him because she was unable to remember where her  was.  reports that she was unable to recall who the president was or any of the events she participated in during the day. She is able to remember past information but not recent information. Patient's  explains that she has had recent stressors such as her mother passing away. She has had similar symptoms in the past that only lasted for two hours. She denies any associated headache, weakness, chest pain, vision changes, or difficulty walking.     Further HPI limited secondary to patient's altered level of consciousness.    REVIEW OF SYSTEMS  Pertinent positives include memory loss. Pertinent negatives include no headache, weakness, chest pain, or vision changes.      Further ROS limited secondary to patient's altered level of consciousness.     PAST MEDICAL HISTORY   has a past medical history of Cancer (HCC); Carcinoma of breast (HCC) (1/2011); Disorder of thyroid; Dyslipidemia (10/2010); ESOPHAGITIS; Family history of early CAD (10/17/2016); gallbladder polyp (1/2011); GERD (gastroesophageal reflux disease); Gynecological disorder; Malignant melanoma (HCC) (12/1983); Migraine; S/P bilateral mastectomy (3/2011); and TIA (transient ischemic attack) (2016).    SURGICAL HISTORY   has a past surgical history that includes tubal ligation (1987); colonoscopy (2/2005); other  orthopedic surgery (1997); other (1983); lumpectomy (1/19/2011); node biopsy sentinel (1/19/2011); breast biopsy (2/24/2011); mastectomy (3/29/2011); breast reconstruction (3/29/2011); tissue expander place/remove (3/29/2011); breast reconstruction (7/15/2011); tissue expander place/remove (7/15/2011); breast implant revision (7/15/2011); capsulectomy (7/15/2011); tissue transfer/rearrange (7/15/2011); nipple reconstruction (12/1/2011); flap graft (12/1/2011); and melanie by laparoscopy (12/6/2012).    SOCIAL HISTORY  Social History   Substance Use Topics   • Smoking status: Never Smoker   • Smokeless tobacco: Never Used   • Alcohol use 0.6 oz/week     1 Standard drinks or equivalent per week      Comment: rarely      History   Drug Use No       FAMILY HISTORY  Family History   Problem Relation Age of Onset   • Cancer Mother 60        breast   • Hyperlipidemia Mother    • Dementia Mother    • Heart Attack Brother         MI at age 50   • Heart Attack Sister         MI at age 52   • Heart Attack Maternal Grandfather         MI ta ge 65   • Heart Disease Sister 52        Down syndrome with heart murmur   • Diabetes Brother 54       CURRENT MEDICATIONS    Current Facility-Administered Medications:   •  levothyroxine (SYNTHROID) tablet 50 mcg, 50 mcg, Oral, QAM AC, Apryl Fernandez M.D.  •  atorvastatin (LIPITOR) tablet 20 mg, 20 mg, Oral, QHS, Apryl Fernandez M.D.  •  aspirin EC (ECOTRIN) tablet 81 mg, 81 mg, Oral, DAILY, Apryl Fernandez M.D.  •  senna-docusate (PERICOLACE or SENOKOT S) 8.6-50 MG per tablet 2 Tab, 2 Tab, Oral, BID **AND** polyethylene glycol/lytes (MIRALAX) PACKET 1 Packet, 1 Packet, Oral, QDAY PRN **AND** magnesium hydroxide (MILK OF MAGNESIA) suspension 30 mL, 30 mL, Oral, QDAY PRN **AND** bisacodyl (DULCOLAX) suppository 10 mg, 10 mg, Rectal, QDAY PRN, Apryl T. Fernandez, M.D.  •  famotidine (PEPCID) tablet 20 mg, 20 mg, Oral, BID, Apryl Fernandez M.D.    ALLERGIES  Allergies   Allergen Reactions   •  "Arimidex [Anastrozole] Swelling       PHYSICAL EXAM  VITAL SIGNS: /75   Pulse 84   Temp 37.1 °C (98.7 °F)   Resp 12   Ht 1.626 m (5' 4\")   Wt 73.5 kg (162 lb)   LMP 10/30/2004   SpO2 95%   BMI 27.81 kg/m²   Constitutional: Alert in no apparent distress.  HENT: No signs of trauma, Bilateral external ears normal, Nose normal.   Eyes: Pupils are equal and reactive, Conjunctiva normal, Non-icteric.   Neck: Normal range of motion, No tenderness, Supple, No stridor.   Cardiovascular: Regular rate and rhythm, no murmurs.   Thorax & Lungs: Normal breath sounds, No respiratory distress, No wheezing, No chest tenderness.   Abdomen: Bowel sounds normal, Soft, No tenderness, No masses, No peritoneal signs.  Skin: Warm, Dry, No erythema, No rash.   Musculoskeletal:  No major deformities noted.   Neurologic: Alert, moving all extremities without difficulty, no focal deficits. Cranial nerves in tact, finger to nose normal, heel to shin normal.    LABS  Labs Reviewed   CBC WITH DIFFERENTIAL - Abnormal; Notable for the following:        Result Value    Neutrophils-Polys 83.00 (*)     Lymphocytes 11.80 (*)     Lymphs (Absolute) 0.95 (*)     All other components within normal limits   COMP METABOLIC PANEL - Abnormal; Notable for the following:     Glucose 102 (*)     All other components within normal limits   URINALYSIS,CULTURE IF INDICATED - Abnormal; Notable for the following:     Leukocyte Esterase Moderate (*)     Occult Blood Trace (*)     All other components within normal limits   URINE MICROSCOPIC (W/UA) - Abnormal; Notable for the following:     WBC 5-10 (*)     All other components within normal limits   TROPONIN   ESTIMATED GFR   TSH   AMMONIA     All labs reviewed by me.    EKG  12 Lead EKG interpreted by me to show:  Normal sinus rhythm  Rate 86  Axis: Normal  Intervals: Normal  Inverted T waves in V3-V6  Normal ST segments  My impression of this EKG: Non specific T wave inversions, no acute ischemia. T " wave inversions unchanged from previous EKG in 2016.    RADIOLOGY  CT-HEAD W/O   Final Result      Normal CT scan of the head without contrast.               INTERPRETING LOCATION:  [address] [facility] [place], [address]      MR-BRAIN-W/O    (Results Pending)     The radiologist's interpretation of all radiological studies have been reviewed by me.    COURSE & MEDICAL DECISION MAKING  Pertinent Labs & Imaging studies reviewed. (See chart for details)    Differential diagnoses include but are not limited to: TIA, transient global amnesia, complicated migraine.    9:29 PM - Patient seen and examined at bedside. Ordered EKG, CBC, CMP, troponin, urinalysis, CT-head, estimated GFR to evaluate her symptoms.     9:39 PM Past medical records reviewed which reveal that she was seen in September of 2016 with similar symptoms. At this time she was confused and repeating words. She had an unremarkable workup at this time.     11:05 PM Spoke with Dr. Fernandez (hospitalist) who agrees to admit the patient. Patient's care was transferred at this time.     11:24 PM Patient reevaluated at bedside. Discussed diagnostic results. Explained that she will be admitted at this time to monitor her symptoms. Patient understands and agrees to be admitted.      Decision Making:  This is a 62 y.o. year old female who presents with acute memory loss.  She does not have any focal neurologic deficits otherwise.  Given this I do not think she was having an acute stroke.  This could be TIA versus TGA.  Head CT was unremarkable.  Labs are otherwise unremarkable.  EKG is unremarkable.  Her symptoms have persisted and she will be admitted for TIA type workup    DISPOSITION:  Patient will be admitted to Dr Fernandez (hospitalist) in stable condition.    FINAL IMPRESSION  1. Altered mental status, unspecified altered mental status type               This dictation has been created using voice recognition software and/or scribes. The accuracy of the dictation is limited  by the abilities of the software and the expertise of the scribes. I expect there may be some errors of grammar and possibly content. I made every attempt to manually correct the errors within my dictation. However, errors related to voice recognition software and/or scribes may still exist and should be interpreted within the appropriate context.     I, Laila Garcia (Scribe), am scribing for, and in the presence of, Della Cartwright M.D..    Electronically signed by: Laila Garcia (Scribe), 9/12/2018    IDella M.D. personally performed the services described in this documentation, as scribed by Laila Garcia in my presence, and it is both accurate and complete. C    The note accurately reflects work and decisions made by me.  Della Cartwright  9/13/2018  1:40 AM

## 2018-09-13 NOTE — PROGRESS NOTES
Pt rec'd from ED via stretcher. Walked from stretcher to bathroom to bed. Bed in low/locked position, bed alarm on and call bell in reach. Pt instructed to call for assistance. History obtained.

## 2018-09-13 NOTE — ED NOTES
Pt denies needs at this time.  reports no improvement on memory, continued repetitive questioning, confusion. Pt reports experiencing high levels of stress recently.

## 2018-09-13 NOTE — PROCEDURES
ROUTINE ELECTROENCEPHALOGRAM REPORT        Referring MD: Dr. Fernandez.      DOS: 9/13/2018 (total recording for 26 minutes).      INDICATION:  Gerri Castelan 62 y.o. female presenting with memory loss.      CURRENT ANTIEPILEPTIC REGIMEN: None.      TECHNIQUE: 30 channel routine electroencephalogram (EEG) was performed in accordance with the international 10-20 system. The study was reviewed in bipolar and referential montages. The recording examined the patient during wakeful and drowsy/sleep state(s).      DESCRIPTION OF THE RECORD:  During the wakefulness, the background showed a symmetrical 10 Hz alpha activity posteriorly with amplitude of 70 mV.  There was reactivity to eye closure/opening.  A normal anterior-posterior gradient was noted with faster beta frequencies seen anteriorly.  During drowsiness, theta/delta frequencies were seen.     During the sleep state, background shows diffuse high-amplitude 4-5 Hz delta activity.  Symmetrical high-amplitude sleep spindles and vertex sharps were seen in the leads over the central regions.      ACTIVATION PROCEDURES:   Intermittent Photic stimulation was performed in a stepwise fashion from 1 to 30 Hz and elicited a normal response (photic driving), most noticeable in the posterior leads.        ICTAL AND/OR INTERICTAL FINDINGS:   No focal or generalized epileptiform activity noted. No regional slowing was seen during this routine study.  No clinical events or seizures were reported or recorded during the study.      EKG: sampling of the EKG recording demonstrated sinus rhythm.        INTERPRETATION:  This is a normal routine EEG recording in the awake, drowsy, and sleep state(s).  Clinical correlation is recommended.     Note: A normal EEG does not rule out epilepsy.  If the clinical suspicion remains high for seizures, a prolonged recording to capture clinical or subclinical events may be helpful.           Giovanni Saha MD   Epilepsy and  Neurodiagnostics.   Clinical  of Neurology [facility].   Diplomate in Neurology, Epilepsy, and Electrodiagnostic Medicine.   Office: [phone]  Fax: [phone]    ALKA FRIEDMAN    DD:  09/13/2018 15:42:20  DT:  09/13/2018 16:18:22    D#:  [id number]  Job#:  [id number]

## 2018-09-13 NOTE — H&P
Hospital Medicine History & Physical Note    Date of Service  9/12/2018    Primary Care Physician  Davidson Murillo M.D.    Consultants  None    Code Status  Full    Chief Complaint  Chief Complaint   Patient presents with   • ALOC     disoriented to event, time       History of Presenting Illness  62 y.o. female who presented on 9/12/2018 with memory loss.  The patient has nearly no memory of the events of today and history is obtained in discussion with the patient's  who is also at bedside.  He states that around 10 AM, he began to have a flurry of phone calls from his wife which she does not recall doing.  According to her Fitbit, around that time, she was tachycardic but again she does not recall this.  Her last memory is of potentially painting in her house earlier today although there are no witnesses to confirm this.  Otherwise, her most recent memory appears to be from yesterday.  Her  notes that she has been more repetitive of late and that there have been social stressors in her life including the death of a family member approximately 2 weeks ago.  She otherwise denies any fevers, chills, nausea, vomiting, headache, chest pain, shortness of breath, diarrhea or dysuria.  She reports that her mother was diagnosed with Alzheimer's and began having symptoms in her 70s.      Review of Systems  Review of Systems   Constitutional: Negative for chills and fever.   HENT: Negative for congestion and sore throat.    Eyes: Negative for photophobia.   Respiratory: Negative for cough, shortness of breath and wheezing.    Cardiovascular: Negative for chest pain and palpitations.   Gastrointestinal: Negative for abdominal pain, diarrhea, nausea and vomiting.   Genitourinary: Negative for dysuria.   Musculoskeletal: Negative for myalgias.   Skin: Negative.    Neurological: Negative for dizziness, tingling, focal weakness and headaches.        Memory loss for most of today   Psychiatric/Behavioral: Negative  for depression and suicidal ideas.       Past Medical History  Past Medical History:   Diagnosis Date   • Family history of early CAD 10/17/2016   • S/P bilateral mastectomy 3/2011    breast reconstruction and then revision   • Carcinoma of breast (HCC) 1/2011    diagnosed at 55, no chemo required due to oncotype   • gallbladder polyp 1/2011   • Dyslipidemia 10/2010   • Malignant melanoma (HCC) 12/1983    trunk   • ESOPHAGITIS    • GERD (gastroesophageal reflux disease)    • Migraine        Surgical History  Past Surgical History:   Procedure Laterality Date   • ANAIS BY LAPAROSCOPY  12/6/2012    Performed by Yessica Levin M.D. at SURGERY [facility]   • NIPPLE RECONSTRUCTION  12/1/2011    Performed by PATEL DALTON at [facility]   • FLAP GRAFT  12/1/2011    Performed by PATEL DALTON at [facility]   • BREAST RECONSTRUCTION  7/15/2011    Performed by PATEL DALTON at [facility]   • TISSUE EXPANDER PLACE/REMOVE  7/15/2011    Performed by PATEL DALTON at [facility]   • BREAST IMPLANT REVISION  7/15/2011    Performed by APTEL DALTON at [facility]   • CAPSULECTOMY  7/15/2011    Performed by PATEL DALTON at [facility]   • TISSUE TRANSFER/REARRANGE  7/15/2011    Performed by PATEL DALTON at [facility]   • MASTECTOMY  3/29/2011    Performed by YESSICA LEVIN at SURGERY [facility]   • BREAST RECONSTRUCTION  3/29/2011    Performed by PATEL DALTON at SURGERY [facility]   • TISSUE EXPANDER PLACE/REMOVE  3/29/2011    Performed by PATEL DALTON at SURGERY [facility]   • BREAST BIOPSY  2/24/2011    Performed by YESSICA LEVIN at SURGERY [facility] DAY [facility]   • LUMPECTOMY  1/19/2011    Performed by YESSICA LEVIN at SURGERY SAME DAY [facility]   • NODE BIOPSY SENTINEL  1/19/2011    Performed by YESSICA LEVIN at SURGERY SAME DAY [facility]   • COLONOSCOPY  2/2005     normal, due    • OTHER ORTHOPEDIC SURGERY      knee surgery ACL repair   • TUBAL LIGATION     • OTHER      malignant melanoma skin cancer upper back       Family History  Family History   Problem Relation Age of Onset   • Cancer Mother 60        breast   • Hyperlipidemia Mother    • Dementia Mother    • Heart Attack Brother         MI at age 50   • Heart Attack Sister         MI at age 52   • Heart Attack Maternal Grandfather         MI ta ge 65   • Heart Disease Sister 52        Down syndrome with heart murmur   • Diabetes Brother 54       Social History  Social History   Substance Use Topics   • Smoking status: Never Smoker   • Smokeless tobacco: Never Used   • Alcohol use 0.6 oz/week     1 Standard drinks or equivalent per week      Comment: rarely       Allergies  Allergies   Allergen Reactions   • Arimidex [Anastrozole] Swelling       Medications  No current facility-administered medications on file prior to encounter.      Current Outpatient Prescriptions on File Prior to Encounter   Medication Sig Dispense Refill   • atorvastatin (LIPITOR) 20 MG Tab Take 1 Tab by mouth every bedtime. 90 Tab 3   • levothyroxine (SYNTHROID) 50 MCG Tab Take 1 Tab by mouth every morning before breakfast. 90 Tab 3   • azithromycin (ZITHROMAX) 250 MG Tab As directed on pack 6 Tab 0   • cholestyramine (QUESTRAN) 4 G packet Take 4 g by mouth every day. 90 Each 3   • ranitidine (ZANTAC) 150 MG Tab Take 150 mg by mouth 2 times a day.     • aspirin EC (ECOTRIN) 81 MG Tablet Delayed Response Take 1 Tab by mouth every day. 30 Tab 0   • Probiotic Product (PROBIOTIC DAILY PO) Take 1 Cap by mouth every day.         Physical Exam  Hemodynamics  Temp (24hrs), Av.1 °C (98.7 °F), Min:37.1 °C (98.7 °F), Max:37.1 °C (98.7 °F)   Temperature: 37.1 °C (98.7 °F)  Pulse  Av  Min: 84  Max: 89 Heart Rate (Monitored): 88  Blood Pressure: 125/75, NIBP: 120/74      Respiratory      Respiration: 12, Pulse Oximetry: 93 %              Physical Exam   Constitutional: She is oriented to person, place, and time. No distress.   HENT:   Head: Normocephalic and atraumatic.   Right Ear: External ear normal.   Left Ear: External ear normal.   Eyes: EOM are normal. Right eye exhibits no discharge. Left eye exhibits no discharge.   Neck: Neck supple. No JVD present.   Cardiovascular: Normal rate, regular rhythm and normal heart sounds.    Pulmonary/Chest: Effort normal and breath sounds normal. No respiratory distress. She exhibits no tenderness.   Abdominal: Soft. Bowel sounds are normal. She exhibits no distension. There is no tenderness.   Musculoskeletal: Normal range of motion. She exhibits no edema.   Neurological: She is alert and oriented to person, place, and time. No cranial nerve deficit.   Skin: Skin is warm and dry. She is not diaphoretic. No erythema.   Psychiatric: She has a normal mood and affect. Her behavior is normal. She exhibits abnormal recent memory.   Nursing note and vitals reviewed.    Capillary refill less than 3 seconds, distal pulses intact    Laboratory:  Recent Labs      09/12/18   2214   WBC  8.0   RBC  4.22   HEMOGLOBIN  13.2   HEMATOCRIT  38.5   MCV  91.2   MCH  31.3   MCHC  34.3   RDW  42.8   PLATELETCT  171   MPV  11.5     Recent Labs      09/12/18   2214   SODIUM  139   POTASSIUM  3.8   CHLORIDE  109   CO2  22   GLUCOSE  102*   BUN  21   CREATININE  0.70   CALCIUM  9.4     Recent Labs      09/12/18   2214   ALTSGPT  21   ASTSGOT  17   ALKPHOSPHAT  63   TBILIRUBIN  0.5   GLUCOSE  102*                 Lab Results   Component Value Date    TROPONINI 0.03 09/12/2018       Imaging  Ct-head W/o    Result Date: 9/12/2018  HISTORY/REASON FOR EXAM:  Altered Mental Status. TECHNIQUE/EXAM DESCRIPTION: CT scan of the head without contrast, 9/12/2018 9:58 PM. Contiguous 5 mm axial sections were obtained from the skull base through the vertex. Up to date radiation dose reduction adjustments have been utilized to meet ALARA  standards for radiation dose reduction. COMPARISON:  9/25/2016 FINDINGS:   The ventricular system and cortical sulci are normal.  There is no midline shift or other mass effect.  There is no acute intra-axial abnormality or extra-axial fluid collection.  There is no intracranial hemorrhage.  The calvaria are intact.  The visualized paranasal sinuses show no unusual opacity.     Normal CT scan of the head without contrast. INTERPRETING LOCATION:  [facility] [facility], [place], [address]        Assessment/Plan:  Anticipate that patient will need less than 2 midnights for management of the discussed medical issues.    * Amnesia   Assessment & Plan    Etiology is unclear, she has had no recent known trauma, current CT of the head is negative for acute injury.  She reports a similar episode approximately 2 years ago and was worked up with no notable findings.  Additionally, she states that she was followed by a neurologist for several visits afterwards and was then released from their care.  She currently has no focal deficits.  She continues to have loss of memory from the events of today.  She denies any family history of CVA or seizure disorder although she does have a history of Alzheimer's in her mother.  I will check an MRI, EEG, and I have requested a speech language consultation for cognitive evaluation.  I will check a TSH.  Pending her clinical status in the morning, we may also consider an inpatient neurology consultation.        Hypothyroidism   Assessment & Plan    Check TSH, continue home Synthroid.        Dyslipidemia- (present on admission)   Assessment & Plan    Continue home Lipitor and aspirin.            Prophylaxis: Sequential compression devices for DVT prophylaxis, no PPI indicated, bowel protocol as needed

## 2018-09-13 NOTE — EEG PROGRESS NOTE
ROUTINE ELECTROENCEPHALOGRAM REPORT      Referring MD: Dr. Fernandez.     DOS: 9/13/2018 (total recording for 26 minutes).     INDICATION:  Gerri Castelan 62 y.o. female presenting with memory loss.     CURRENT ANTIEPILEPTIC REGIMEN: None.     TECHNIQUE: 30 channel routine electroencephalogram (EEG) was performed in accordance with the international 10-20 system. The study was reviewed in bipolar and referential montages. The recording examined the patient during wakeful and drowsy/sleep state(s).     DESCRIPTION OF THE RECORD:  During the wakefulness, the background showed a symmetrical 10 Hz alpha activity posteriorly with amplitude of 70 mV.  There was reactivity to eye closure/opening.  A normal anterior-posterior gradient was noted with faster beta frequencies seen anteriorly.  During drowsiness, theta/delta frequencies were seen.    During the sleep state, background shows diffuse high-amplitude 4-5 Hz delta activity.  Symmetrical high-amplitude sleep spindles and vertex sharps were seen in the leads over the central regions.     ACTIVATION PROCEDURES:   Intermittent Photic stimulation was performed in a stepwise fashion from 1 to 30 Hz and elicited a normal response (photic driving), most noticeable in the posterior leads.      ICTAL AND/OR INTERICTAL FINDINGS:   No focal or generalized epileptiform activity noted. No regional slowing was seen during this routine study.  No clinical events or seizures were reported or recorded during the study.     EKG: sampling of the EKG recording demonstrated sinus rhythm.      INTERPRETATION:  This is a normal routine EEG recording in the awake, drowsy, and sleep state(s).  Clinical correlation is recommended.    Note: A normal EEG does not rule out epilepsy.  If the clinical suspicion remains high for seizures, a prolonged recording to capture clinical or subclinical events may be helpful.        Giovanni Saha MD   Epilepsy and Neurodiagnostics.    Clinical  of Neurology [facility].   Diplomate in Neurology, Epilepsy, and Electrodiagnostic Medicine.   Office: [phone]  Fax: [phone]

## 2018-09-13 NOTE — ASSESSMENT & PLAN NOTE
-transient, could be an element of transient global amnesia versus conversion disorder versus TIA versus early onset MCI versus dementia  -MRI brain and carotid duplex are normal except for enlarged pituitary  -similar presentation 2 years ago with negative w/u  -continue above medications  -Neurology consulted  -EEG normal  -no focal neuro deficits noted on PE today  -monitor on tele for occult arrhythmias, none observed

## 2018-09-13 NOTE — DISCHARGE PLANNING
Anticipated Discharge Disposition:   home    Action:   Met with pt.   Assessment completed.    Discussed with IDT :  MRI and EEG pending.  Possible for psych issues due to recent deaths in family    Barriers to Discharge:   Medical clearance    Plan:   Follow  RN kindly notify CM if there are any discharge concerns.     Addendum:  Talked to pt and  about grief relief. Gave them information about the grief support meetings and gave them the packet of [facility] Difficult times information.
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
## 2018-09-13 NOTE — PROGRESS NOTES
Hospital Medicine Daily Progress Note    Date of Service  9/13/2018    Chief Complaint  62 y.o. female admitted 9/12/2018 with short term retrograde amnesia    Hospital Course  See below    Interval Problem Update  Amnesia-feels that she can form new short term memories better now, but has no obvious recollection of activities yesterday. This happened 2 years ago and was told she had a TIA and MRI brain was negative at that time. She denies any other neurological symptoms at this time. She does, however, endorse large amount of stress at home including death of her mom and near death of her nephew, which have affected her greatly. She does see a regular therapist for this issue.     Consultants/Specialty  Neurology    Disposition  Neuro    Review of Systems  Review of Systems   Constitutional: Negative for chills and fever.   HENT: Negative for hearing loss.    Eyes: Negative for blurred vision.   Respiratory: Negative for shortness of breath.    Cardiovascular: Negative for chest pain, palpitations and leg swelling.   Gastrointestinal: Negative for abdominal pain, nausea and vomiting.   Genitourinary: Negative for dysuria and urgency.   Musculoskeletal: Negative for myalgias and neck pain.   Neurological: Negative for dizziness, tingling, focal weakness, loss of consciousness and headaches.   Endo/Heme/Allergies: Negative.    Psychiatric/Behavioral: Positive for memory loss. Negative for depression.   All other systems reviewed and are negative.       Physical Exam  Blood Pressure: 114/66   Temperature: 36.8 °C (98.2 °F)   Pulse: 78   Respiration: 17   Pulse Oximetry: 95 %     Physical Exam   Constitutional: She is oriented to person, place, and time. She appears well-developed and well-nourished. No distress.   HENT:   Head: Normocephalic and atraumatic.   Mouth/Throat: No oropharyngeal exudate.   Eyes: Pupils are equal, round, and reactive to light. No scleral icterus.   Neck: Normal range of motion. Neck supple.  No thyromegaly present.   Cardiovascular: Normal rate, regular rhythm, normal heart sounds and intact distal pulses.    No murmur heard.  Pulmonary/Chest: Effort normal and breath sounds normal. No respiratory distress. She has no wheezes.   Abdominal: Soft. Bowel sounds are normal. She exhibits no distension. There is no tenderness.   Musculoskeletal: Normal range of motion. She exhibits no edema or tenderness.   Neurological: She is alert and oriented to person, place, and time. No cranial nerve deficit.   Skin: Skin is warm and dry. No rash noted.   Psychiatric: She has a normal mood and affect.   Nursing note and vitals reviewed.      Fluids  No intake or output data in the 24 hours ending 09/13/18 1156    Laboratory  Recent Labs      09/12/18   2214   WBC  8.0   RBC  4.22   HEMOGLOBIN  13.2   HEMATOCRIT  38.5   MCV  91.2   MCH  31.3   MCHC  34.3   RDW  42.8   PLATELETCT  171   MPV  11.5     Recent Labs      09/12/18   2214   SODIUM  139   POTASSIUM  3.8   CHLORIDE  109   CO2  22   GLUCOSE  102*   BUN  21   CREATININE  0.70   CALCIUM  9.4                   Imaging  CT-HEAD W/O   Final Result      Normal CT scan of the head without contrast.               INTERPRETING LOCATION:  [address] [place] [address], [address]      MR-BRAIN-W/O    (Results Pending)   CAROTID DUPLEX    (Results Pending)        Assessment/Plan  * Amnesia- (present on admission)   Assessment & Plan    -transient, could be an element of transient global amnesia versus conversion disorder versus TIA  -MRI brain and carotid duplex pending  -similar presentation 2 years ago with negative w/u  -continue above medications  -Neurology consulted  -EEG pending  -no focal neuro deficits noted on PE today  -monitor on tele for occult arrhythmias        Hypothyroidism- (present on admission)   Assessment & Plan    - continue home Synthroid, TSH is WNL        Dyslipidemia- (present on admission)   Assessment & Plan    Continue home Lipitor and aspirin, seems to be  tolerating well

## 2018-09-13 NOTE — ED TRIAGE NOTES
"Chief Complaint   Patient presents with   • ALOC     disoriented to event, time     Blood pressure 125/75, pulse 84, temperature 37.1 °C (98.7 °F), resp. rate 12, height 1.626 m (5' 4\"), weight 73.5 kg (162 lb), last menstrual period 10/30/2004, SpO2 95 %.    Pt bib EMS AxOx2. Pt is alert, talking, confused to event, time, repetitive questioning. Denies trama, has equal strength bilaterally, ambulates with a steady gait. Pt was at home painting, , son report receiving multiple phone calls at 1830. Family reports the pt was confused, she was not making sense. Pt has equal strength bilaterally, no presence of facial droop, no visual deficits. Hx TIA  "

## 2018-09-14 ENCOUNTER — APPOINTMENT (OUTPATIENT)
Dept: RADIOLOGY | Facility: MEDICAL CENTER | Age: 63
End: 2018-09-14
Attending: INTERNAL MEDICINE
Payer: COMMERCIAL

## 2018-09-14 PROBLEM — D35.2 PITUITARY MACROADENOMA (HCC): Status: ACTIVE | Noted: 2018-09-14

## 2018-09-14 LAB
LH SERPL-ACNC: 4 IU/L
TREPONEMA PALLIDUM IGG+IGM AB [PRESENCE] IN SERUM OR PLASMA BY IMMUNOASSAY: NON REACTIVE
TSH SERPL DL<=0.005 MIU/L-ACNC: 1.6 UIU/ML (ref 0.38–5.33)
VIT B12 SERPL-MCNC: 276 PG/ML (ref 211–911)

## 2018-09-14 PROCEDURE — 84443 ASSAY THYROID STIM HORMONE: CPT

## 2018-09-14 PROCEDURE — 93880 EXTRACRANIAL BILAT STUDY: CPT

## 2018-09-14 PROCEDURE — G9167 ATTEN D/C STATUS: HCPCS | Mod: CH

## 2018-09-14 PROCEDURE — 82024 ASSAY OF ACTH: CPT

## 2018-09-14 PROCEDURE — 86780 TREPONEMA PALLIDUM: CPT

## 2018-09-14 PROCEDURE — 70553 MRI BRAIN STEM W/O & W/DYE: CPT

## 2018-09-14 PROCEDURE — 83002 ASSAY OF GONADOTROPIN (LH): CPT

## 2018-09-14 PROCEDURE — 36415 COLL VENOUS BLD VENIPUNCTURE: CPT

## 2018-09-14 PROCEDURE — A9270 NON-COVERED ITEM OR SERVICE: HCPCS | Performed by: HOSPITALIST

## 2018-09-14 PROCEDURE — 700117 HCHG RX CONTRAST REV CODE 255: Performed by: INTERNAL MEDICINE

## 2018-09-14 PROCEDURE — A9585 GADOBUTROL INJECTION: HCPCS | Performed by: INTERNAL MEDICINE

## 2018-09-14 PROCEDURE — 92523 SPEECH SOUND LANG COMPREHEN: CPT

## 2018-09-14 PROCEDURE — 82607 VITAMIN B-12: CPT

## 2018-09-14 PROCEDURE — 700102 HCHG RX REV CODE 250 W/ 637 OVERRIDE(OP): Performed by: HOSPITALIST

## 2018-09-14 PROCEDURE — 83003 ASSAY GROWTH HORMONE (HGH): CPT

## 2018-09-14 PROCEDURE — G9165 ATTEN CURRENT STATUS: HCPCS | Mod: CH

## 2018-09-14 PROCEDURE — 99225 PR SUBSEQUENT OBSERVATION CARE,LEVEL II: CPT | Performed by: INTERNAL MEDICINE

## 2018-09-14 PROCEDURE — G0378 HOSPITAL OBSERVATION PER HR: HCPCS

## 2018-09-14 PROCEDURE — G9166 ATTEN GOAL STATUS: HCPCS | Mod: CH

## 2018-09-14 RX ORDER — GADOBUTROL 604.72 MG/ML
7 INJECTION INTRAVENOUS ONCE
Status: COMPLETED | OUTPATIENT
Start: 2018-09-14 | End: 2018-09-14

## 2018-09-14 RX ADMIN — FAMOTIDINE 20 MG: 20 TABLET ORAL at 06:01

## 2018-09-14 RX ADMIN — ATORVASTATIN CALCIUM 20 MG: 20 TABLET, FILM COATED ORAL at 21:26

## 2018-09-14 RX ADMIN — FAMOTIDINE 20 MG: 20 TABLET ORAL at 18:45

## 2018-09-14 RX ADMIN — LEVOTHYROXINE SODIUM 50 MCG: 50 TABLET ORAL at 06:01

## 2018-09-14 RX ADMIN — ASPIRIN 81 MG: 81 TABLET, DELAYED RELEASE ORAL at 21:26

## 2018-09-14 RX ADMIN — GADOBUTROL 7 ML: 604.72 INJECTION INTRAVENOUS at 22:00

## 2018-09-14 ASSESSMENT — PAIN SCALES - GENERAL
PAINLEVEL_OUTOF10: 0

## 2018-09-14 ASSESSMENT — ENCOUNTER SYMPTOMS
FOCAL WEAKNESS: 0
DIARRHEA: 0
BLURRED VISION: 0
DEPRESSION: 0
MEMORY LOSS: 1
NECK PAIN: 0
TINGLING: 0
SHORTNESS OF BREATH: 0
FEVER: 0
LOSS OF CONSCIOUSNESS: 0
DIZZINESS: 0
HEADACHES: 0
MYALGIAS: 0

## 2018-09-14 NOTE — PROGRESS NOTES
Report received 0700. Assessment completed. Patient awake, alert and oriented. Pt reports no pain, 0/10. No reported discomforts at this time. Call light and personal belongings within reach

## 2018-09-14 NOTE — DISCHARGE PLANNING
Anticipated Discharge Disposition:   home    Action:   MD indicated dc tomorrow.   No dc concerns.     Barriers to Discharge:   Medical clearance    Plan:   Dc tomorrow.
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
## 2018-09-14 NOTE — ASSESSMENT & PLAN NOTE
-incidentally discovered, does not seem to have any overt symptoms  -MRI brain dedicated to PITUITARY  -check hormone levels, no vision issues  -likely will need long term follow up

## 2018-09-14 NOTE — THERAPY
"Speech Language Therapy Evaluation completed to address cognition  Functional Status:  Cognitive-linguistic evaluation completed on this date.  The patient presented with in functional limits for cognition.  She demonstrated adequate reasoning, problem solving, sequencing, and safety awareness skills.  In a STM task, the patient independently recalled 3/3 objects after 30 minutes.  Reading and writing were intact at the multi-sentence level. The patient reported that she is currently experiencing high levels of stress relating to her mother's recent death and her nephew's accident.  She has not had any further episodes of memory lapse.    Recommendations:  The patient presented WFL for cognition.  No further acute SLP needs at this time.    Plan of Care: Patient with no further skilled SLP needs in the acute care setting at this time  Post-Acute Therapy: Currently anticipate no further skilled therapy needs once patient is discharged from the inpatient setting.    See \"Rehab Therapy-Acute\" Patient Summary Report for complete documentation.   "

## 2018-09-14 NOTE — PROGRESS NOTES
Hospital Medicine Daily Progress Note    Date of Service  9/14/2018    Chief Complaint  62 y.o. female admitted 9/12/2018 with short term retrograde amnesia    Hospital Course  See below    Interval Problem Update  Amnesia-no new memory issues. MRI and EEG normal. Feels back to her baseline. MRI brain incidentally showed a pituitary macroadenoma.      Consultants/Specialty  Neurology    Disposition  Neuro    Review of Systems  Review of Systems   Constitutional: Negative for fever.   Eyes: Negative for blurred vision.   Respiratory: Negative for shortness of breath.    Cardiovascular: Negative for leg swelling.   Gastrointestinal: Negative for diarrhea.   Genitourinary: Negative for dysuria.   Musculoskeletal: Negative for myalgias and neck pain.   Skin: Negative for rash.   Neurological: Negative for dizziness, tingling, focal weakness, loss of consciousness and headaches.   Endo/Heme/Allergies: Negative.    Psychiatric/Behavioral: Positive for memory loss. Negative for depression.        No recent changes   All other systems reviewed and are negative.       Physical Exam  Blood Pressure: 114/66   Temperature: 36.8 °C (98.2 °F)   Pulse: 78   Respiration: 17   Pulse Oximetry: 95 %     Physical Exam   Constitutional: She is oriented to person, place, and time. She appears well-developed and well-nourished. No distress.   HENT:   Head: Normocephalic and atraumatic.   Mouth/Throat: No oropharyngeal exudate.   Eyes: Pupils are equal, round, and reactive to light. Right eye exhibits no discharge. Left eye exhibits no discharge.   Neck: Normal range of motion. Neck supple.   Cardiovascular: Normal rate, regular rhythm, normal heart sounds and intact distal pulses.    Pulmonary/Chest: Effort normal and breath sounds normal. No stridor. She exhibits no tenderness.   Abdominal: Soft. Bowel sounds are normal. There is no tenderness.   Musculoskeletal: Normal range of motion. She exhibits no edema or tenderness.    Neurological: She is alert and oriented to person, place, and time. No cranial nerve deficit.   Skin: Skin is warm and dry. No rash noted.   Psychiatric: She has a normal mood and affect.   Nursing note and vitals reviewed.      Fluids  No intake or output data in the 24 hours ending 09/14/18 1315    Laboratory  Recent Labs      09/12/18   2214   WBC  8.0   RBC  4.22   HEMOGLOBIN  13.2   HEMATOCRIT  38.5   MCV  91.2   MCH  31.3   MCHC  34.3   RDW  42.8   PLATELETCT  171   MPV  11.5     Recent Labs      09/12/18   2214   SODIUM  139   POTASSIUM  3.8   CHLORIDE  109   CO2  22   GLUCOSE  102*   BUN  21   CREATININE  0.70   CALCIUM  9.4                   Imaging  CAROTID DUPLEX   Final Result      MR-BRAIN-W/O   Final Result         1. Age-related cerebral atrophy.      2. Mild periventricular white matter changes consistent with chronic microvascular ischemic gliosis.      3. Enlarging pituitary macroadenoma with slight extension into the cavernous sinuses. Recommend MRI scan of the pituitary gland for further evaluation.      CT-HEAD W/O   Final Result      Normal CT scan of the head without contrast.               INTERPRETING LOCATION:  [address] [place] [address], [facility]      MR-BRAIN-WITH & W/O    (Results Pending)        Assessment/Plan  * Amnesia- (present on admission)   Assessment & Plan    -transient, could be an element of transient global amnesia versus conversion disorder versus TIA versus early onset MCI versus dementia  -MRI brain and carotid duplex are normal except for enlarged pituitary  -similar presentation 2 years ago with negative w/u  -continue above medications  -Neurology consulted  -EEG normal  -no focal neuro deficits noted on PE today  -monitor on tele for occult arrhythmias, none observed        Pituitary macroadenoma (HCC)- (present on admission)   Assessment & Plan    -incidentally discovered, does not seem to have any overt symptoms  -MRI brain dedicated to PITUITARY  -check hormone levels,  no vision issues  -likely will need long term follow up        Hypothyroidism- (present on admission)   Assessment & Plan    - continue home Synthroid, TSH is WNL        Dyslipidemia- (present on admission)   Assessment & Plan    Continue home Lipitor and aspirin, seems to be tolerating well

## 2018-09-15 ENCOUNTER — PATIENT OUTREACH (OUTPATIENT)
Dept: HEALTH INFORMATION MANAGEMENT | Facility: OTHER | Age: 63
End: 2018-09-15

## 2018-09-15 VITALS
WEIGHT: 162 LBS | RESPIRATION RATE: 18 BRPM | SYSTOLIC BLOOD PRESSURE: 120 MMHG | TEMPERATURE: 97.1 F | HEART RATE: 97 BPM | BODY MASS INDEX: 27.66 KG/M2 | OXYGEN SATURATION: 95 % | DIASTOLIC BLOOD PRESSURE: 75 MMHG | HEIGHT: 64 IN

## 2018-09-15 PROBLEM — R41.3 AMNESIA: Status: RESOLVED | Noted: 2018-09-13 | Resolved: 2018-09-15

## 2018-09-15 LAB — ACTH PLAS-MCNC: 15 PG/ML (ref 6–58)

## 2018-09-15 PROCEDURE — G0378 HOSPITAL OBSERVATION PER HR: HCPCS

## 2018-09-15 PROCEDURE — 700102 HCHG RX REV CODE 250 W/ 637 OVERRIDE(OP): Performed by: HOSPITALIST

## 2018-09-15 PROCEDURE — A9270 NON-COVERED ITEM OR SERVICE: HCPCS | Performed by: HOSPITALIST

## 2018-09-15 PROCEDURE — 99217 PR OBSERVATION CARE DISCHARGE: CPT | Performed by: INTERNAL MEDICINE

## 2018-09-15 RX ADMIN — LEVOTHYROXINE SODIUM 50 MCG: 50 TABLET ORAL at 06:17

## 2018-09-15 RX ADMIN — FAMOTIDINE 20 MG: 20 TABLET ORAL at 06:17

## 2018-09-15 ASSESSMENT — PAIN SCALES - GENERAL: PAINLEVEL_OUTOF10: 0

## 2018-09-15 NOTE — PROGRESS NOTES
Pt assessed, A&Ox4, ambulating, gait steady. No c/o pain/discomfort, denies any needs at this time. Off to MRI via wheelchair with hospital transport.

## 2018-09-15 NOTE — DISCHARGE INSTRUCTIONS
Discharge Instructions    Discharge patient Home   Diet low fat, low sugar, heart healthy with 9-13 servings of fruits and vegetables   Activities as tolerated   Follow ups with primary care physician (PCP) in 7-10 days, call for appointment   Meds per med rec sheet   No smoking, no alcohol, no caffeine   Wear seat belt in motorized vehicle   Take medications as perscribed   Keep appointments   If symptoms worsen call PCP, 911 or urgent care.    Discharged to home by car with relative. Discharged via wheelchair, hospital escort: Yes.  Special equipment needed: Not Applicable    Be sure to schedule a follow-up appointment with your primary care doctor or any specialists as instructed.     Discharge Plan:   Diet Plan: Discussed  Activity Level: Discussed  Confirmed Follow up Appointment: Patient to Call and Schedule Appointment  Medication Reconciliation Updated: Yes  Influenza Vaccine Indication: Patient Refuses    I understand that a diet low in cholesterol, fat, and sodium is recommended for good health. Unless I have been given specific instructions below for another diet, I accept this instruction as my diet prescription.   Other diet: as tolerated    Special Instructions: None    · Is patient discharged on Warfarin / Coumadin?   No     Depression / Suicide Risk    As [facility] are [facility], it is important to learn how to keep safe from harming yourself.    Recognize the warning signs:  · Abrupt changes in personality, positive or negative- including increase in energy   · Giving away possessions  · Change in eating patterns- significant weight changes-  positive or negative  · Change in sleeping patterns- unable to sleep or sleeping all the time   · Unwillingness or inability to communicate  · Depression  · Unusual sadness, discouragement and loneliness  · Talk of wanting to die  · Neglect of personal appearance   · Rebelliousness- reckless behavior  · Withdrawal from  people/activities they love  · Confusion- inability to concentrate     If you or a loved one observes any of these behaviors or has concerns about self-harm, here's what you can do:  · Talk about it- your feelings and reasons for harming yourself  · Remove any means that you might use to hurt yourself (examples: pills, rope, extension cords, firearm)  · Get professional help from the community (Mental Health, Substance Abuse, psychological counseling)  · Do not be alone:Call your Safe Contact- someone whom you trust who will be there for you.  · Call your local CRISIS HOTLINE 237-0666 or 672-257-3928  · Call your local Children's Mobile Crisis Response Team [place] (620) 361-8166 or www.[employer]  · Call the toll free National Suicide Prevention Hotlines   · National Suicide Prevention Lifeline 976-684-XALK (6274)  · National Hope Line Network 800-SUICIDE (573-8107)

## 2018-09-15 NOTE — PROGRESS NOTES
Discharge instructions and prescriptions provided and reviewed w/ patient.  Drains removed per active order. All questions answered. Pt refused to be escorted by wheelchair.  All belongings are with patient.

## 2018-09-15 NOTE — DISCHARGE SUMMARY
Discharge Summary    CHIEF COMPLAINT ON ADMISSION  Chief Complaint   Patient presents with   • ALOC     disoriented to event, time       Reason for Admission  EMS     Admission Date  9/12/2018    CODE STATUS  Prior    HPI & HOSPITAL COURSE  This is a 62 y.o. female here with above medical issues. She was transferred from outside hospital. She had transient global amnesia of events for many years and had almost an exact episode 2 years prior. She was admitted to the neurology floor and neurology was consulted. Her extensive work up was negative except for incidental finding of a pituitary macroadenoma with no encroachment on other important brain structures. She had no focal neurological deficits. Her basic endocrinology labs are normal and few other are pending. She will need serial annual head imaging to follow this. Etiology of her amnestic episodes is unknown, but could be an early sign of a dementia and she is recommended to get neuropsychological testing outpatient. She returned to her baseline. Additionally, patient has been under a lot of stress and already sees an outpatient therapist.        Therefore, she is discharged in fair and stable condition to home with close outpatient follow-up.        Discharge Date  9/15/2018    FOLLOW UP ITEMS POST DISCHARGE  F/U labs with PCP in 1-2 weeks    DISCHARGE DIAGNOSES  Principal Problem (Resolved):    Amnesia POA: Yes  Active Problems:    Dyslipidemia POA: Yes    Hypothyroidism POA: Yes    Pituitary macroadenoma (HCC) POA: Yes      FOLLOW UP  Future Appointments  Date Time Provider Department Center   9/21/2018 2:00 PM MIKE Neville [facility] EDWIN Murillo M.D.  [address]  [facility] [address]  [facility] [address]  [phone]    Schedule an appointment as soon as possible for a visit        MEDICATIONS ON DISCHARGE     Medication List      CONTINUE taking these medications      Instructions   aspirin EC 81 MG Tbec  Commonly known as:   ECOTRIN   Take 1 Tab by mouth every day.  Dose:  81 mg     atorvastatin 20 MG Tabs  Commonly known as:  LIPITOR   Take 1 Tab by mouth every bedtime.  Dose:  20 mg     cholestyramine 4 g packet  Commonly known as:  QUESTRAN   Take 4 g by mouth every day.  Dose:  1 Packet     levothyroxine 50 MCG Tabs  Commonly known as:  SYNTHROID   Take 1 Tab by mouth every morning before breakfast.  Dose:  50 mcg     PROBIOTIC DAILY PO   Take 1 Cap by mouth every day.  Dose:  1 Cap     raNITidine 150 MG Tabs  Commonly known as:  ZANTAC   Take 150 mg by mouth 2 times a day.  Dose:  150 mg        STOP taking these medications    azithromycin 250 MG Tabs  Commonly known as:  ZITHROMAX            Allergies  Allergies   Allergen Reactions   • Arimidex [Anastrozole] Swelling       DIET  Low fat low sugar heart healthy    ACTIVITY  As tolerated.  Weight bearing as tolerated    CONSULTATIONS  NEUROLOGY    PROCEDURES  MR-BRAIN-WITH & W/O   Final Result      1.  26 x 15 x 14 mm sellar mass extending into the cavernous sinus bilaterally.   2.  Mild atrophy   3.  Mild white matter changes      CAROTID DUPLEX   Final Result      MR-BRAIN-W/O   Final Result         1. Age-related cerebral atrophy.      2. Mild periventricular white matter changes consistent with chronic microvascular ischemic gliosis.      3. Enlarging pituitary macroadenoma with slight extension into the cavernous sinuses. Recommend MRI scan of the pituitary gland for further evaluation.      CT-HEAD W/O   Final Result      Normal CT scan of the head without contrast.               INTERPRETING LOCATION:  [address] [place] [address], [id number]            LABORATORY  Lab Results   Component Value Date    SODIUM 139 09/12/2018    POTASSIUM 3.8 09/12/2018    CHLORIDE 109 09/12/2018    CO2 22 09/12/2018    GLUCOSE 102 (H) 09/12/2018    BUN 21 09/12/2018    CREATININE 0.70 09/12/2018        Lab Results   Component Value Date    WBC 8.0 09/12/2018    WBC 5.4 04/26/2011    HEMOGLOBIN 13.2  09/12/2018    HEMATOCRIT 38.5 09/12/2018    PLATELETCT 171 09/12/2018        Total time of the discharge process exceeds 35 minutes.

## 2018-09-15 NOTE — CARE PLAN
Problem: Communication  Goal: The ability to communicate needs accurately and effectively will improve  Outcome: PROGRESSING AS EXPECTED  Pt is able to verbalize her needs/concerns. POC discussed, pt verbalized understanding.    Problem: Infection  Goal: Will remain free from infection  Outcome: PROGRESSING AS EXPECTED  No signs/symptoms of infection observed upon assessment.

## 2018-09-16 LAB — GHRH SERPL-MCNC: <0.05 NG/ML (ref 0.05–8)

## 2018-09-18 ENCOUNTER — OFFICE VISIT (OUTPATIENT)
Dept: MEDICAL GROUP | Facility: MEDICAL CENTER | Age: 63
End: 2018-09-18
Payer: COMMERCIAL

## 2018-09-18 VITALS
HEART RATE: 64 BPM | OXYGEN SATURATION: 98 % | RESPIRATION RATE: 12 BRPM | HEIGHT: 65 IN | DIASTOLIC BLOOD PRESSURE: 80 MMHG | TEMPERATURE: 98.1 F | BODY MASS INDEX: 27.82 KG/M2 | WEIGHT: 167 LBS | SYSTOLIC BLOOD PRESSURE: 110 MMHG

## 2018-09-18 DIAGNOSIS — F32.A DEPRESSION, UNSPECIFIED DEPRESSION TYPE: ICD-10-CM

## 2018-09-18 DIAGNOSIS — R41.3 TEMPORARY AMNESIA: ICD-10-CM

## 2018-09-18 DIAGNOSIS — D35.2 PITUITARY MACROADENOMA (HCC): ICD-10-CM

## 2018-09-18 DIAGNOSIS — B00.1 COLD SORE: ICD-10-CM

## 2018-09-18 PROCEDURE — 99213 OFFICE O/P EST LOW 20 MIN: CPT | Performed by: NURSE PRACTITIONER

## 2018-09-18 RX ORDER — VALACYCLOVIR HYDROCHLORIDE 1 G/1
1000 TABLET, FILM COATED ORAL 2 TIMES DAILY
Qty: 20 TAB | Refills: 3 | Status: SHIPPED | OUTPATIENT
Start: 2018-09-18 | End: 2019-11-25

## 2018-09-18 NOTE — PROGRESS NOTES
"CC: Follow up amnesia episode    Gerri Castelan is a 62 y.o. female here to establish care and to discuss the evaluation and management of:      Temporary amnesia  States went to the ER recently for having temporary amnesia.  Patient states that she was at home by herself and apparently did try to call her  17 times.  Patient states the last thing she remembers was painting and having lunch then next thing you know she was in the emergency room.  Does not recall any of the events that happened.  Denies ever feeling \"bad.\"  Denies any recollection of passing out.  She does make note that her mom did die a month ago, she did have Alzheimer's.  Patient states she is already going to counseling.  States that she almost lost her nephew around the same time that her mom  so she has been a little bit stressed about this.  She states that she is getting about 7-8 hours of sleep. No new medications, no excessive alcohol use.   She mentions that she is concerned about atorvastatin \"affecting her memory.\" Has been taking for a few years-does not recall when she started.  She also mentions that about 2 years ago  she was riding her bike and \"pushing herself very hard\" and states she never made it up the hill, did not remember anything else that happened besides being at the hospital. She states that she was checked out by cardiology and was discharged from services. Did not see neurology.     Pituitary macroadenoma (HCC)  Incidental finding on recent ER visit. Had MRI.      ROS:  Denies any Headache, Blurred Vision, Confusion Chest pain,  Shortness of breath,  Abdominal pain, Changes of bowel or bladder, Lower ext edema, Fevers, Nights sweats, Weight Changes, Focal weakness or numbness.  All other systems are negative. Memory loss      Current Outpatient Prescriptions:   •  valacyclovir (VALTREX) 1 GM Tab, Take 1 Tab by mouth 2 times a day., Disp: 20 Tab, Rfl: 3  •  atorvastatin (LIPITOR) 20 MG Tab, Take 1 " Tab by mouth every bedtime., Disp: 90 Tab, Rfl: 3  •  levothyroxine (SYNTHROID) 50 MCG Tab, Take 1 Tab by mouth every morning before breakfast., Disp: 90 Tab, Rfl: 3  •  cholestyramine (QUESTRAN) 4 G packet, Take 4 g by mouth every day., Disp: 90 Each, Rfl: 3  •  ranitidine (ZANTAC) 150 MG Tab, Take 150 mg by mouth 2 times a day., Disp: , Rfl:   •  aspirin EC (ECOTRIN) 81 MG Tablet Delayed Response, Take 1 Tab by mouth every day., Disp: 30 Tab, Rfl: 0  •  Probiotic Product (PROBIOTIC DAILY PO), Take 1 Cap by mouth every day., Disp: , Rfl:     Allergies   Allergen Reactions   • Arimidex [Anastrozole] Swelling       Past Medical History:   Diagnosis Date   • Cancer (HCC)    • Carcinoma of breast (HCC) 1/2011    diagnosed at 55, no chemo required due to oncotype   • Disorder of thyroid    • Dyslipidemia 10/2010   • ESOPHAGITIS    • Family history of early CAD 10/17/2016   • gallbladder polyp 1/2011   • GERD (gastroesophageal reflux disease)    • Gynecological disorder    • Malignant melanoma (HCC) 12/1983    trunk   • Migraine    • S/P bilateral mastectomy 3/2011    breast reconstruction and then revision   • TIA (transient ischemic attack) 2016     Past Surgical History:   Procedure Laterality Date   • ANAIS BY LAPAROSCOPY  12/6/2012    Performed by Yessica Ocampo M.D. at SURGERY [facility]   • NIPPLE RECONSTRUCTION  12/1/2011    Performed by PATEL DALTON at [facility]   • FLAP GRAFT  12/1/2011    Performed by PATEL DALTON at [facility]   • BREAST RECONSTRUCTION  7/15/2011    Performed by PATEL DALTON at [facility]   • TISSUE EXPANDER PLACE/REMOVE  7/15/2011    Performed by PATEL DALTON at [facility]   • BREAST IMPLANT REVISION  7/15/2011    Performed by PATEL DALTON at [facility]   • CAPSULECTOMY  7/15/2011    Performed by PATEL DALTON at [facility]   • TISSUE TRANSFER/REARRANGE  7/15/2011     "Performed by PATEL DALTON at SURGERY [facility]S   • MASTECTOMY  3/29/2011    Performed by HANNAH LEVIN at SURGERY [facility]   • BREAST RECONSTRUCTION  3/29/2011    Performed by PATEL DALTON at SURGERY [facility]   • TISSUE EXPANDER PLACE/REMOVE  3/29/2011    Performed by PATEL DALTON at SURGERY [facility]   • BREAST BIOPSY  2/24/2011    Performed by HANNAH LEVIN at SURGERY [facility] DAY [facility]S   • LUMPECTOMY  1/19/2011    Performed by HANNAH LEVIN at SURGERY [facility] DAY [facility]S   • NODE BIOPSY SENTINEL  1/19/2011    Performed by HANNAH LEVIN at SURGERY [facility] DAY [facility]   • COLONOSCOPY  2/2005    normal, due 2015   • OTHER ORTHOPEDIC SURGERY  1997    knee surgery ACL repair   • TUBAL LIGATION  1987   • OTHER  1983    malignant melanoma skin cancer upper back     Family History   Problem Relation Age of Onset   • Cancer Mother 60        breast   • Hyperlipidemia Mother    • Dementia Mother    • Heart Attack Brother         MI at age 50   • Heart Attack Sister         MI at age 52   • Heart Attack Maternal Grandfather         MI ta ge 65   • Heart Disease Sister 52        Down syndrome with heart murmur   • Diabetes Brother 54     Social History     Social History   • Marital status:      Spouse name: N/A   • Number of children: N/A   • Years of education: N/A     Occupational History   • Not on file.     Social History Main Topics   • Smoking status: Never Smoker   • Smokeless tobacco: Never Used   • Alcohol use 0.6 oz/week     1 Standard drinks or equivalent per week      Comment: rarely   • Drug use: No   • Sexual activity: Yes     Partners: Male     Other Topics Concern   • Not on file     Social History Narrative   • No narrative on file       Objective:     Vitals: /80   Pulse 64   Temp 36.7 °C (98.1 °F)   Resp 12   Ht 1.651 m (5' 5\")   Wt 75.8 kg (167 lb)   LMP 10/30/2004   SpO2 98%   BMI 27.79 kg/m²      General: Alert, pleasant, " "NAD  HEENT:  Normocephalic.   Heart:  Regular rate and rhythm.  S1 and S2 normal.  No murmurs appreciated.    Respiratory:  Normal respiratory effort.  Clear to auscultation bilaterally.    Skin:  Warm, dry, no rashes  Musculoskeletal:  Gait is normal.  Moves all extremities well.  Extremities:   No leg edema.  Neurological: No tremors, sensation grossly intact,  Psych:  Affect/mood is normal, judgement is good, memory is forgetful, grooming is appropriate.      Assessment and Plan.   62 y.o. female here to follow up from ER visit    1. Pituitary macroadenoma (HCC)  Incidental finding while inpatient for temporary amnesia. MRI 9/13/2018 \"Enlarging pituitary macroadenoma\" refer to neurology  - REFERRAL TO NEUROLOGY    2. Temporary amnesia  Resolved. Still confused by the entire event, hard for her to make sense of this.  Possibly due to increased amounts of stress, her mother did pass away within the last month.  Her nephew was also was severely injured.  Patient is already going to counseling.    Carotid duplex completed 9/2018 \"minimal bilateral carotid plaque, less than 50% luminal narrowing\"    3. Cold sore  - valacyclovir (VALTREX) 1 GM Tab; Take 1 Tab by mouth 2 times a day.  Dispense: 20 Tab; Refill: 3    4. Depression, unspecified depression type  - REFERRAL TO PSYCHOLOGY      Health Maintenance:     Return in about 4 weeks (around 10/16/2018).        Leonor FLOYD.  "

## 2018-09-20 ENCOUNTER — PATIENT MESSAGE (OUTPATIENT)
Dept: MEDICAL GROUP | Facility: MEDICAL CENTER | Age: 63
End: 2018-09-20

## 2018-09-22 DIAGNOSIS — D35.2 PITUITARY MACROADENOMA (HCC): ICD-10-CM

## 2018-09-22 DIAGNOSIS — R06.02 SHORTNESS OF BREATH: ICD-10-CM

## 2018-09-24 DIAGNOSIS — D35.2 PITUITARY ADENOMA WITH EXTRASELLAR EXTENSION (HCC): ICD-10-CM

## 2018-09-26 ENCOUNTER — HOSPITAL ENCOUNTER (OUTPATIENT)
Dept: RADIOLOGY | Facility: MEDICAL CENTER | Age: 63
End: 2018-09-26
Attending: FAMILY MEDICINE
Payer: COMMERCIAL

## 2018-09-26 DIAGNOSIS — R06.02 SHORTNESS OF BREATH: ICD-10-CM

## 2018-09-26 PROCEDURE — 71046 X-RAY EXAM CHEST 2 VIEWS: CPT

## 2018-10-02 DIAGNOSIS — R19.7 DIARRHEA IN ADULT PATIENT: ICD-10-CM

## 2018-10-02 RX ORDER — CHOLESTYRAMINE 4 G/9G
1 POWDER, FOR SUSPENSION ORAL DAILY
Qty: 90 EACH | Refills: 3 | Status: SHIPPED | OUTPATIENT
Start: 2018-10-02 | End: 2019-02-01

## 2018-10-07 DIAGNOSIS — D49.7 INCIDENTALLY-NOTED PITUITARY TUMOR: ICD-10-CM

## 2018-10-08 ENCOUNTER — HOSPITAL ENCOUNTER (OUTPATIENT)
Dept: LAB | Facility: MEDICAL CENTER | Age: 63
End: 2018-10-08
Attending: FAMILY MEDICINE
Payer: COMMERCIAL

## 2018-10-08 DIAGNOSIS — R68.82 DIMINISHED LIBIDO: ICD-10-CM

## 2018-10-08 DIAGNOSIS — D49.7 INCIDENTALLY-NOTED PITUITARY TUMOR: ICD-10-CM

## 2018-10-08 LAB — PROLACTIN SERPL-MCNC: 889.05 NG/ML (ref 2.8–26)

## 2018-10-08 PROCEDURE — 84270 ASSAY OF SEX HORMONE GLOBUL: CPT

## 2018-10-08 PROCEDURE — 84305 ASSAY OF SOMATOMEDIN: CPT

## 2018-10-08 PROCEDURE — 36415 COLL VENOUS BLD VENIPUNCTURE: CPT

## 2018-10-08 PROCEDURE — 84403 ASSAY OF TOTAL TESTOSTERONE: CPT

## 2018-10-08 PROCEDURE — 84146 ASSAY OF PROLACTIN: CPT

## 2018-10-10 LAB
IGF-I SERPL-MCNC: 136 NG/ML (ref 38–244)
IGF-I Z-SCORE SERPL: 0.4

## 2018-10-12 LAB
SHBG SERPL-SCNC: 55 NMOL/L (ref 30–135)
TESTOST FREE SERPL-MCNC: 2.3 PG/ML (ref 0.6–3.8)
TESTOST SERPL-MCNC: 19 NG/DL (ref 5–32)

## 2018-11-20 ENCOUNTER — OFFICE VISIT (OUTPATIENT)
Dept: MEDICAL GROUP | Facility: MEDICAL CENTER | Age: 63
End: 2018-11-20
Payer: COMMERCIAL

## 2018-11-20 VITALS
BODY MASS INDEX: 28.66 KG/M2 | OXYGEN SATURATION: 94 % | DIASTOLIC BLOOD PRESSURE: 68 MMHG | HEIGHT: 65 IN | RESPIRATION RATE: 12 BRPM | TEMPERATURE: 99 F | SYSTOLIC BLOOD PRESSURE: 118 MMHG | HEART RATE: 80 BPM | WEIGHT: 172 LBS

## 2018-11-20 DIAGNOSIS — K21.9 GASTROESOPHAGEAL REFLUX DISEASE WITHOUT ESOPHAGITIS: ICD-10-CM

## 2018-11-20 DIAGNOSIS — D35.2 PROLACTINOMA (HCC): ICD-10-CM

## 2018-11-20 PROCEDURE — 99213 OFFICE O/P EST LOW 20 MIN: CPT | Performed by: FAMILY MEDICINE

## 2018-11-20 RX ORDER — OMEPRAZOLE 40 MG/1
40 CAPSULE, DELAYED RELEASE ORAL DAILY
Qty: 90 CAP | Refills: 3 | Status: SHIPPED | OUTPATIENT
Start: 2018-11-20 | End: 2019-11-11 | Stop reason: SDUPTHER

## 2018-11-20 NOTE — LETTER
[facility]  Davidson Murillo M.D.  [address]  [place] [address]  Fax: [phone]   Authorization for Release/Disclosure of   Protected Health Information   Name: MARY ACEVEDO : 1955 SSN: [id number]   Address: [address] [place] [address] [place] [address] Phone:    [phone] (home)    I authorize the entity listed below to release/disclose the PHI below to:   [employer]/Davidson Murillo M.D. and Davidson Murillo M.D.   Provider or Entity Name:  Dr. Valdivia [facility]   Address   City, [facility]  [address] [facility]  [address] Phone:      Fax:     Reason for request: continuity of care   Information to be released:    [  ] LAST COLONOSCOPY,  including any PATH REPORT and follow-up  [  ] LAST FIT/COLOGUARD RESULT [  ] LAST DEXA  [  ] LAST MAMMOGRAM  [  ] LAST PAP  [  ] LAST LABS [  ] RETINA EXAM REPORT  [  ] IMMUNIZATION RECORDS  [ x ] Release all info  Consult note please      [  ] Check here and initial the line next to each item to release ALL health information INCLUDING  _____ Care and treatment for drug and / or alcohol abuse  _____ HIV testing, infection status, or AIDS  _____ Genetic Testing    DATES OF SERVICE OR TIME PERIOD TO BE DISCLOSED: _____________  I understand and acknowledge that:  * This Authorization may be revoked at any time by you in writing, except if your health information has already been used or disclosed.  * Your health information that will be used or disclosed as a result of you signing this authorization could be re-disclosed by the recipient. If this occurs, your re-disclosed health information may no longer be protected by State or Federal laws.  * You may refuse to sign this Authorization. Your refusal will not affect your ability to obtain treatment.  * This Authorization becomes effective upon signing and will  on (date) __________.      If no date is indicated, this Authorization will  one (1) year from the signature date.       Name: Gerri Castelan    Signature:   Date:     11/20/2018       PLEASE FAX REQUESTED RECORDS BACK TO: [phone]

## 2018-11-20 NOTE — PROGRESS NOTES
Chief Complaint   Patient presents with   • Gastrophageal Reflux   • Brain Tumor       Subjective:     HPI:   Gerri Castelan presents today with the followin. Gastroesophageal reflux disease without esophagitis  She feels the current medication regimen of ranitidine is not controlling the gastroesophageal reflux symptoms well. Has dysphagia, reflux symptoms, acidity.  Has occasional abdominal pain. Denies visible blood or mucus in the stool. Denies vomiting or hematemesis. Gets nausea and burping and abdominal bloating. Patient avoids nonsteroidal anti-inflammatory drugs.  Discussed.  Avoids heavy meals or eating within 2 hours of bedtime.    2. Prolactinoma (HCC)  Will be seeing Dr. Neal next Monday.  Prolactin level is over 800.  Getting increased fatigue.  Recent ophthalmology appointment with Dr. Valdivia two weeks ago () showed no optic nerve issues per patient.  Finds balance is not as good, recent fall.  No breast milk but patient has had double mastectomy.          Patient Active Problem List    Diagnosis Date Noted   • Prolactinoma (HCC)    • Incidentally-noted pituitary tumor (HCC) 10/07/2018   • Pituitary adenoma with extrasellar extension (HCC) 2018   • Pituitary macroadenoma (HCC) 2018   • Hypothyroidism 2018   • Elevated fasting glucose 2016   • Abnormal echocardiogram 2016   • Family history of early CAD 10/17/2016   • Dyslipidemia 2016   • Idiopathic hypothyroidism 2016   • Chronic right shoulder pain 2016   • Frequent loose stools 2016   • Chronic migraine without aura without status migrainosus, not intractable    • Melanoma (HCC) 2011   • Personal history of breast cancer    • Family history of breast cancer in mother 2010   • GERD (gastroesophageal reflux disease) 2009       Current medicines (including changes today)  Current Outpatient Prescriptions   Medication Sig Dispense Refill   • omeprazole  "(PRILOSEC) 40 MG delayed-release capsule Take 1 Cap by mouth every day. 90 Cap 3   • cholestyramine (QUESTRAN) 4 g packet Take 4 g by mouth every day. 90 Each 3   • valacyclovir (VALTREX) 1 GM Tab Take 1 Tab by mouth 2 times a day. 20 Tab 3   • atorvastatin (LIPITOR) 20 MG Tab Take 1 Tab by mouth every bedtime. 90 Tab 3   • levothyroxine (SYNTHROID) 50 MCG Tab Take 1 Tab by mouth every morning before breakfast. 90 Tab 3   • aspirin EC (ECOTRIN) 81 MG Tablet Delayed Response Take 1 Tab by mouth every day. 30 Tab 0   • Probiotic Product (PROBIOTIC DAILY PO) Take 1 Cap by mouth every day.       No current facility-administered medications for this visit.        Allergies   Allergen Reactions   • Arimidex [Anastrozole] Swelling       ROS: As per HPI       Objective:     Blood pressure 118/68, pulse 80, temperature 37.2 °C (99 °F), resp. rate 12, height 1.651 m (5' 5\"), weight 78 kg (172 lb), last menstrual period 10/30/2004, SpO2 94 %, not currently breastfeeding. Body mass index is 28.62 kg/m².    Physical Exam:  Constitutional: Well-developed and well-nourished. Not diaphoretic. No distress. Lucid and fluent.  Skin: Skin is warm and dry. No rash noted.  Head: Atraumatic without lesions.  Eyes: Conjunctivae and extraocular motions are normal. Pupils are equal, round, and reactive to light. No scleral icterus.   Mouth/Throat: Tongue normal. Oropharynx is clear and moist. Posterior pharynx without erythema or exudates.  Neck: Supple, trachea midline. No thyromegaly present. No cervical or supraclavicular lymphadenopathy. No JVD or carotid bruits appreciated  Cardiovascular: Regular rate and rhythm.  Normal S1, S2 without murmur appreciated.  Chest: Effort normal. Clear to auscultation throughout. No adventitious sounds.   Abdomen: Soft, non tender, and without distention. Active bowel sounds in all four quadrants. No rebound, guarding, masses or hepatosplenomegaly.  Extremities: No cyanosis, clubbing, erythema, nor " edema.   Neurological: Alert and oriented x 3. Romberg is negative.  Movements are strong and symmetric. Slight fine tremor appreciated.    Psychiatric:  Behavior, mood, and affect are appropriate.       Assessment and Plan:     63 y.o. female with the following issues:    1. Gastroesophageal reflux disease without esophagitis  omeprazole (PRILOSEC) 40 MG delayed-release capsule   2. Prolactinoma (HCC)           Followup: Return in about 3 months (around 2/20/2019), or if symptoms worsen or fail to improve.

## 2018-11-30 DIAGNOSIS — R40.4 TRANSIENT ALTERATION OF AWARENESS: ICD-10-CM

## 2018-12-19 ENCOUNTER — HOSPITAL ENCOUNTER (OUTPATIENT)
Dept: LAB | Facility: MEDICAL CENTER | Age: 63
End: 2018-12-19
Attending: NEUROLOGICAL SURGERY
Payer: COMMERCIAL

## 2018-12-19 LAB — PROLACTIN SERPL-MCNC: 165.46 NG/ML (ref 2.8–26)

## 2018-12-19 PROCEDURE — 36415 COLL VENOUS BLD VENIPUNCTURE: CPT

## 2018-12-19 PROCEDURE — 84146 ASSAY OF PROLACTIN: CPT

## 2019-01-17 ENCOUNTER — APPOINTMENT (RX ONLY)
Dept: URBAN - METROPOLITAN AREA CLINIC 35 | Facility: CLINIC | Age: 64
Setting detail: DERMATOLOGY
End: 2019-01-17

## 2019-01-17 DIAGNOSIS — L81.4 OTHER MELANIN HYPERPIGMENTATION: ICD-10-CM

## 2019-01-17 DIAGNOSIS — L82.1 OTHER SEBORRHEIC KERATOSIS: ICD-10-CM

## 2019-01-17 DIAGNOSIS — F42.4 EXCORIATION (SKIN-PICKING) DISORDER: ICD-10-CM

## 2019-01-17 DIAGNOSIS — Z85.828 PERSONAL HISTORY OF OTHER MALIGNANT NEOPLASM OF SKIN: ICD-10-CM

## 2019-01-17 DIAGNOSIS — D22 MELANOCYTIC NEVI: ICD-10-CM

## 2019-01-17 DIAGNOSIS — Z85.820 PERSONAL HISTORY OF MALIGNANT MELANOMA OF SKIN: ICD-10-CM

## 2019-01-17 DIAGNOSIS — Z71.89 OTHER SPECIFIED COUNSELING: ICD-10-CM

## 2019-01-17 PROBLEM — S20.409A UNSPECIFIED SUPERFICIAL INJURIES OF UNSPECIFIED BACK WALL OF THORAX, INITIAL ENCOUNTER: Status: ACTIVE | Noted: 2019-01-17

## 2019-01-17 PROBLEM — D22.5 MELANOCYTIC NEVI OF TRUNK: Status: ACTIVE | Noted: 2019-01-17

## 2019-01-17 PROCEDURE — ? COUNSELING

## 2019-01-17 PROCEDURE — ? FOLLOW UP FOR NEXT VISIT

## 2019-01-17 PROCEDURE — ? ADDITIONAL NOTES

## 2019-01-17 PROCEDURE — 99214 OFFICE O/P EST MOD 30 MIN: CPT

## 2019-01-17 ASSESSMENT — LOCATION DETAILED DESCRIPTION DERM
LOCATION DETAILED: RIGHT INFERIOR MEDIAL UPPER BACK
LOCATION DETAILED: LEFT SUPERIOR ANTERIOR NECK
LOCATION DETAILED: RIGHT SUPERIOR MEDIAL UPPER BACK
LOCATION DETAILED: SUPERIOR THORACIC SPINE
LOCATION DETAILED: RIGHT LATERAL INFERIOR EYELID
LOCATION DETAILED: RIGHT SUPERIOR UPPER BACK
LOCATION DETAILED: SUPERIOR LUMBAR SPINE
LOCATION DETAILED: RIGHT ANTERIOR LATERAL PROXIMAL THIGH
LOCATION DETAILED: INFERIOR THORACIC SPINE

## 2019-01-17 ASSESSMENT — LOCATION SIMPLE DESCRIPTION DERM
LOCATION SIMPLE: RIGHT THIGH
LOCATION SIMPLE: UPPER BACK
LOCATION SIMPLE: RIGHT UPPER BACK
LOCATION SIMPLE: LEFT ANTERIOR NECK
LOCATION SIMPLE: RIGHT INFERIOR EYELID
LOCATION SIMPLE: LOWER BACK

## 2019-01-17 ASSESSMENT — LOCATION ZONE DERM
LOCATION ZONE: EYELID
LOCATION ZONE: NECK
LOCATION ZONE: TRUNK
LOCATION ZONE: LEG

## 2019-01-17 NOTE — PROCEDURE: ADDITIONAL NOTES
Detail Level: Detailed
Additional Notes: Advised patient we can treat SK under right eye with LN2 at next visit.

## 2019-01-17 NOTE — PROCEDURE: FOLLOW UP FOR NEXT VISIT
Scheduled For Follow Up In (Optional): 6 weeks
Detail Level: Detailed
Instructions (Optional): Recheck

## 2019-01-17 NOTE — PROCEDURE: COUNSELING
Detail Level: Generalized
Quality 137: Melanoma: Continuity Of Care - Recall System: Patient information entered into a recall system that includes: target date for the next exam specified AND a process to follow up with patients regarding missed or unscheduled appointments
Quality 224: Stage 0-Iic Melanoma: Overutilization Of Imaging Studies For Only Stage 0-Iic Melanoma: None of the following diagnostic imaging studies ordered: chest X-ray, CT, Ultrasound, MRI, PET, or nuclear medicine scans (ML)
Detail Level: Simple
Patient Specific Counseling (Will Not Stick From Patient To Patient): Advised to stop scratching. She can use bacitracin or Neosporin.

## 2019-02-01 DIAGNOSIS — K59.1 FUNCTIONAL DIARRHEA: ICD-10-CM

## 2019-02-01 RX ORDER — CHOLESTYRAMINE 4 G/9G
4 POWDER, FOR SUSPENSION ORAL DAILY
Qty: 540 G | Refills: 3 | Status: SHIPPED | OUTPATIENT
Start: 2019-02-01 | End: 2019-09-03

## 2019-02-01 NOTE — ADDENDUM NOTE
Encounter addended by: Lupis Ortega on: 2/1/2019 10:43 AM<BR>    Actions taken: Charge Capture section accepted

## 2019-02-04 DIAGNOSIS — R79.89 ELEVATED TSH: ICD-10-CM

## 2019-02-04 DIAGNOSIS — E78.5 DYSLIPIDEMIA: ICD-10-CM

## 2019-02-04 RX ORDER — LEVOTHYROXINE SODIUM 0.05 MG/1
50 TABLET ORAL
Qty: 90 TAB | Refills: 3 | Status: SHIPPED | OUTPATIENT
Start: 2019-02-04 | End: 2019-11-11 | Stop reason: SDUPTHER

## 2019-02-04 RX ORDER — ATORVASTATIN CALCIUM 20 MG/1
20 TABLET, FILM COATED ORAL
Qty: 90 TAB | Refills: 3 | Status: SHIPPED | OUTPATIENT
Start: 2019-02-04 | End: 2019-11-11 | Stop reason: SDUPTHER

## 2019-02-08 ENCOUNTER — HOSPITAL ENCOUNTER (OUTPATIENT)
Dept: LAB | Facility: MEDICAL CENTER | Age: 64
End: 2019-02-08
Attending: NEUROLOGICAL SURGERY
Payer: COMMERCIAL

## 2019-02-08 ENCOUNTER — HOSPITAL ENCOUNTER (OUTPATIENT)
Dept: RADIOLOGY | Facility: MEDICAL CENTER | Age: 64
End: 2019-02-08
Attending: NEUROLOGICAL SURGERY
Payer: COMMERCIAL

## 2019-02-08 DIAGNOSIS — D35.3 BENIGN NEOPLASM OF PITUITARY GLAND AND CRANIOPHARYNGEAL DUCT (POUCH) (HCC): ICD-10-CM

## 2019-02-08 DIAGNOSIS — D35.2 BENIGN NEOPLASM OF PITUITARY GLAND AND CRANIOPHARYNGEAL DUCT (POUCH) (HCC): ICD-10-CM

## 2019-02-08 LAB
BUN SERPL-MCNC: 18 MG/DL (ref 8–22)
CORTIS SERPL-MCNC: 6.3 UG/DL (ref 0–23)
CREAT SERPL-MCNC: 1.03 MG/DL (ref 0.5–1.4)
FSH SERPL-ACNC: 55.8 MIU/ML
LH SERPL-ACNC: 23 IU/L
PROLACTIN SERPL-MCNC: 28.52 NG/ML (ref 2.8–26)
T4 FREE SERPL-MCNC: 0.97 NG/DL (ref 0.53–1.43)
TSH SERPL DL<=0.005 MIU/L-ACNC: 1.26 UIU/ML (ref 0.38–5.33)

## 2019-02-08 PROCEDURE — 84439 ASSAY OF FREE THYROXINE: CPT

## 2019-02-08 PROCEDURE — 84146 ASSAY OF PROLACTIN: CPT

## 2019-02-08 PROCEDURE — 82533 TOTAL CORTISOL: CPT

## 2019-02-08 PROCEDURE — 36415 COLL VENOUS BLD VENIPUNCTURE: CPT

## 2019-02-08 PROCEDURE — 84305 ASSAY OF SOMATOMEDIN: CPT

## 2019-02-08 PROCEDURE — 83002 ASSAY OF GONADOTROPIN (LH): CPT

## 2019-02-08 PROCEDURE — 82024 ASSAY OF ACTH: CPT

## 2019-02-08 PROCEDURE — 84520 ASSAY OF UREA NITROGEN: CPT

## 2019-02-08 PROCEDURE — 82565 ASSAY OF CREATININE: CPT

## 2019-02-08 PROCEDURE — 84443 ASSAY THYROID STIM HORMONE: CPT

## 2019-02-08 PROCEDURE — 82671 ASSAY OF ESTROGENS: CPT

## 2019-02-08 PROCEDURE — 83001 ASSAY OF GONADOTROPIN (FSH): CPT

## 2019-02-09 ENCOUNTER — HOSPITAL ENCOUNTER (OUTPATIENT)
Dept: LAB | Facility: MEDICAL CENTER | Age: 64
End: 2019-02-09
Attending: NEUROLOGICAL SURGERY
Payer: COMMERCIAL

## 2019-02-09 LAB
BUN SERPL-MCNC: 22 MG/DL (ref 8–22)
CREAT SERPL-MCNC: 0.89 MG/DL (ref 0.5–1.4)

## 2019-02-09 PROCEDURE — 84520 ASSAY OF UREA NITROGEN: CPT

## 2019-02-09 PROCEDURE — 36415 COLL VENOUS BLD VENIPUNCTURE: CPT

## 2019-02-09 PROCEDURE — 82565 ASSAY OF CREATININE: CPT

## 2019-02-11 LAB — ACTH PLAS-MCNC: 17 PG/ML (ref 6–58)

## 2019-02-12 LAB
ESTRADIOL SERPL HS-MCNC: 3.3 PG/ML
ESTROGEN SERPL CALC-MCNC: 22.1 PG/ML
ESTRONE SERPL-MCNC: 18.8 PG/ML
IGF-I SERPL-MCNC: 137 NG/ML (ref 36–244)
IGF-I Z-SCORE SERPL: 0.4

## 2019-02-13 ENCOUNTER — HOSPITAL ENCOUNTER (OUTPATIENT)
Dept: RADIOLOGY | Facility: MEDICAL CENTER | Age: 64
End: 2019-02-13
Attending: NEUROLOGICAL SURGERY
Payer: COMMERCIAL

## 2019-02-13 PROCEDURE — A9585 GADOBUTROL INJECTION: HCPCS | Performed by: NEUROLOGICAL SURGERY

## 2019-02-13 PROCEDURE — 700117 HCHG RX CONTRAST REV CODE 255: Performed by: NEUROLOGICAL SURGERY

## 2019-02-13 PROCEDURE — 70553 MRI BRAIN STEM W/O & W/DYE: CPT

## 2019-02-13 RX ORDER — GADOBUTROL 604.72 MG/ML
7.5 INJECTION INTRAVENOUS ONCE
Status: COMPLETED | OUTPATIENT
Start: 2019-02-13 | End: 2019-02-13

## 2019-02-13 RX ADMIN — GADOBUTROL 7.5 ML: 604.72 INJECTION INTRAVENOUS at 16:24

## 2019-02-14 RX ORDER — BROMOCRIPTINE MESYLATE 2.5 MG/1
TABLET ORAL
Refills: 0 | COMMUNITY
Start: 2019-01-25 | End: 2022-07-29

## 2019-03-11 ENCOUNTER — OFFICE VISIT (OUTPATIENT)
Dept: MEDICAL GROUP | Facility: MEDICAL CENTER | Age: 64
End: 2019-03-11
Payer: COMMERCIAL

## 2019-03-11 ENCOUNTER — TELEPHONE (OUTPATIENT)
Dept: MEDICAL GROUP | Facility: MEDICAL CENTER | Age: 64
End: 2019-03-11

## 2019-03-11 VITALS
HEART RATE: 82 BPM | HEIGHT: 65 IN | RESPIRATION RATE: 14 BRPM | TEMPERATURE: 99.2 F | DIASTOLIC BLOOD PRESSURE: 58 MMHG | SYSTOLIC BLOOD PRESSURE: 98 MMHG | OXYGEN SATURATION: 93 % | WEIGHT: 164 LBS | BODY MASS INDEX: 27.32 KG/M2

## 2019-03-11 DIAGNOSIS — R05.3 PERSISTENT COUGH: ICD-10-CM

## 2019-03-11 PROCEDURE — 99213 OFFICE O/P EST LOW 20 MIN: CPT | Performed by: FAMILY MEDICINE

## 2019-03-11 RX ORDER — BENZONATATE 100 MG/1
100 CAPSULE ORAL 3 TIMES DAILY PRN
Qty: 60 CAP | Refills: 0 | Status: SHIPPED | OUTPATIENT
Start: 2019-03-11 | End: 2019-04-05

## 2019-03-11 RX ORDER — BENZONATATE 100 MG/1
100 CAPSULE ORAL 3 TIMES DAILY PRN
Qty: 60 CAP | Refills: 0 | Status: SHIPPED | OUTPATIENT
Start: 2019-03-11 | End: 2019-03-11 | Stop reason: SDUPTHER

## 2019-03-11 NOTE — PROGRESS NOTES
Chief Complaint   Patient presents with   • Cough       Subjective:     HPI:   Gerri Castelan presents today with the followin. Persistent cough  The cough is worse when she lies down, interferes with sleep.  She has tried tylenol cold and sinus and dimetapp with no help.  Hot toddy did not help.  She is getting some chills, no fever.  The cough is productive, a lot of phlegm.  It is dark and green.        Patient Active Problem List    Diagnosis Date Noted   • Prolactinoma (HCC)    • Incidentally-noted pituitary tumor ([facility]) 10/07/2018   • Pituitary adenoma with extrasellar extension ([facility]) 2018   • Pituitary macroadenoma ([facility]) 2018   • Hypothyroidism 2018   • Elevated fasting glucose 2016   • Abnormal echocardiogram 2016   • Family history of early CAD 10/17/2016   • Dyslipidemia 2016   • Idiopathic hypothyroidism 2016   • Chronic right shoulder pain 2016   • Frequent loose stools 2016   • Chronic migraine without aura without status migrainosus, not intractable    • Melanoma ([facility]) 2011   • Personal history of breast cancer    • Family history of breast cancer in mother 2010   • GERD (gastroesophageal reflux disease) 2009       Current medicines (including changes today)  Current Outpatient Prescriptions   Medication Sig Dispense Refill   • Chlorpheniramine-Codeine 2-10 MG/5ML Liquid Take 5 mL by mouth every 6 hours as needed (cough) for up to 7 days. 180 mL 0   • benzonatate (TESSALON) 100 MG Cap Take 1 Cap by mouth 3 times a day as needed for Cough. 60 Cap 0   • bromocriptine (PARLODEL) 2.5 MG Tab TK 1 T PO BID  0   • atorvastatin (LIPITOR) 20 MG Tab Take 1 Tab by mouth every bedtime. 90 Tab 3   • levothyroxine (SYNTHROID) 50 MCG Tab Take 1 Tab by mouth every morning before breakfast. 90 Tab 3   • cholestyramine (QUESTRAN) 4 GM/DOSE powder Take 4 g by mouth every day. 540 g 3   • omeprazole (PRILOSEC) 40 MG delayed-release  "capsule Take 1 Cap by mouth every day. 90 Cap 3   • valacyclovir (VALTREX) 1 GM Tab Take 1 Tab by mouth 2 times a day. 20 Tab 3   • aspirin EC (ECOTRIN) 81 MG Tablet Delayed Response Take 1 Tab by mouth every day. 30 Tab 0   • Probiotic Product (PROBIOTIC DAILY PO) Take 1 Cap by mouth every day.       No current facility-administered medications for this visit.        Allergies   Allergen Reactions   • Arimidex [Anastrozole] Swelling       ROS: As per HPI       Objective:     Blood pressure (!) 98/58, pulse 82, temperature 37.3 °C (99.2 °F), resp. rate 14, height 1.651 m (5' 5\"), weight 74.4 kg (164 lb), last menstrual period 10/30/2004, SpO2 93 %, not currently breastfeeding. Body mass index is 27.29 kg/m².    Physical Exam:  Constitutional: Well-developed and well-nourished. Not diaphoretic. No distress. Lucid and fluent.  Skin: Skin is warm and dry. No rash noted.  Head: Atraumatic without lesions.  Eyes: Conjunctivae and extraocular motions are normal. Pupils are equal, round, and reactive to light. No scleral icterus.   Ears:  External ears unremarkable. Tympanic membranes fluid meniscus bilaterally.  Mild erythema.  Nose: Nares patent. Mucosa with mild edema, no erythema. No discharge. No facial tenderness.  Mouth/Throat: Tongue normal. Oropharynx is clear and moist. Posterior pharynx without erythema or exudates.  Neck: Supple, trachea midline. No thyromegaly present. No cervical or supraclavicular lymphadenopathy. No JVD or carotid bruits appreciated  Cardiovascular: Regular rate and rhythm.  Normal S1, S2 without murmur appreciated.  Chest: Effort normal. Clear to auscultation throughout. No adventitious sounds.   Extremities: No cyanosis, clubbing, erythema, nor edema.   Neurological: Alert and oriented x 3. No tremor appreciated.  Psychiatric:  Behavior, mood, and affect are appropriate.       Assessment and Plan:     63 y.o. female with the following issues:    1. Persistent cough  " Chlorpheniramine-Codeine 2-10 MG/5ML Liquid    benzonatate (TESSALON) 100 MG Cap    DISCONTINUED: benzonatate (TESSALON) 100 MG Cap         Followup: Return in about 4 months (around 7/11/2019), or if symptoms worsen or fail to improve.

## 2019-03-11 NOTE — TELEPHONE ENCOUNTER
Toro from [facility]'s pharmacy called stating that they are out of RX cough medicine with codeine. They have Robitussin AC with codeine if Dr. Murillo wants to change the RX. Otherwise, the pharmacy will have the original Rx tomorrow.

## 2019-03-21 ENCOUNTER — TELEPHONE (OUTPATIENT)
Dept: PHYSICAL MEDICINE AND REHAB | Facility: MEDICAL CENTER | Age: 64
End: 2019-03-21

## 2019-03-22 NOTE — TELEPHONE ENCOUNTER
Pt called and left VM for making APT, as she was called once before. I called back and left VM of my own for her to call once more to get scheduled.

## 2019-04-05 ENCOUNTER — OFFICE VISIT (OUTPATIENT)
Dept: MEDICAL GROUP | Facility: MEDICAL CENTER | Age: 64
End: 2019-04-05
Payer: COMMERCIAL

## 2019-04-05 VITALS
SYSTOLIC BLOOD PRESSURE: 114 MMHG | HEART RATE: 70 BPM | TEMPERATURE: 98.3 F | OXYGEN SATURATION: 96 % | WEIGHT: 163 LBS | RESPIRATION RATE: 14 BRPM | BODY MASS INDEX: 27.16 KG/M2 | HEIGHT: 65 IN | DIASTOLIC BLOOD PRESSURE: 78 MMHG

## 2019-04-05 DIAGNOSIS — B97.89 VIRAL RESPIRATORY ILLNESS: ICD-10-CM

## 2019-04-05 DIAGNOSIS — R05.3 PERSISTENT COUGH FOR 3 WEEKS OR LONGER: ICD-10-CM

## 2019-04-05 DIAGNOSIS — J98.8 VIRAL RESPIRATORY ILLNESS: ICD-10-CM

## 2019-04-05 PROCEDURE — 99213 OFFICE O/P EST LOW 20 MIN: CPT | Performed by: FAMILY MEDICINE

## 2019-04-05 NOTE — PROGRESS NOTES
Chief Complaint   Patient presents with   • Shortness of Breath   • Cough       Subjective:     HPI:   Gerri Castelan presents today with the followin. Viral respiratory illness  Has had this a month.  Phlegm is getting thinner.  Still very fatigued and SOB with exertion.  On auscultation the lungs sound clear.    2. Persistent cough for 3 weeks or longer  She is using the prescription cough medication as needed only, states she still has half a bottle.  Has been coughing up thick green phlegm.        Patient Active Problem List    Diagnosis Date Noted   • Prolactinoma (HCC)    • Incidentally-noted pituitary tumor (HCC) 10/07/2018   • Pituitary adenoma with extrasellar extension (HCC) 2018   • Pituitary macroadenoma (HCC) 2018   • Hypothyroidism 2018   • Elevated fasting glucose 2016   • Abnormal echocardiogram 2016   • Family history of early CAD 10/17/2016   • Dyslipidemia 2016   • Idiopathic hypothyroidism 2016   • Chronic right shoulder pain 2016   • Frequent loose stools 2016   • Chronic migraine without aura without status migrainosus, not intractable    • Melanoma (HCC) 2011   • Personal history of breast cancer    • Family history of breast cancer in mother 2010   • GERD (gastroesophageal reflux disease) 2009       Current medicines (including changes today)  Current Outpatient Prescriptions   Medication Sig Dispense Refill   • bromocriptine (PARLODEL) 2.5 MG Tab TK 1 T PO BID  0   • atorvastatin (LIPITOR) 20 MG Tab Take 1 Tab by mouth every bedtime. 90 Tab 3   • levothyroxine (SYNTHROID) 50 MCG Tab Take 1 Tab by mouth every morning before breakfast. 90 Tab 3   • cholestyramine (QUESTRAN) 4 GM/DOSE powder Take 4 g by mouth every day. 540 g 3   • omeprazole (PRILOSEC) 40 MG delayed-release capsule Take 1 Cap by mouth every day. 90 Cap 3   • valacyclovir (VALTREX) 1 GM Tab Take 1 Tab by mouth 2 times a day. 20 Tab 3   •  "aspirin EC (ECOTRIN) 81 MG Tablet Delayed Response Take 1 Tab by mouth every day. 30 Tab 0   • Probiotic Product (PROBIOTIC DAILY PO) Take 1 Cap by mouth every day.       No current facility-administered medications for this visit.        Allergies   Allergen Reactions   • Arimidex [Anastrozole] Swelling       ROS: As per HPI       Objective:     /78   Pulse 70   Temp 36.8 °C (98.3 °F)   Resp 14   Ht 1.651 m (5' 5\")   Wt 73.9 kg (163 lb)   SpO2 96%  Body mass index is 27.12 kg/m².    Physical Exam:  Constitutional: Well-developed and well-nourished. Not diaphoretic. No distress. Lucid and fluent.  Skin: Skin is warm and dry. No rash noted.  Head: Atraumatic without lesions.  Eyes: Conjunctivae and extraocular motions are normal. Pupils are equal, round, and reactive to light. No scleral icterus.   Nose: Nares patent. Mucosa with mild edema and erythema. No discharge. No facial tenderness.  Mouth/Throat: Tongue normal. Oropharynx is clear and moist. Posterior pharynx with mild erythema, no exudates.  Neck: Supple, trachea midline. No thyromegaly present. No cervical or supraclavicular lymphadenopathy. No JVD or carotid bruits appreciated  Cardiovascular: Regular rate and rhythm.  Normal S1, S2 without murmur appreciated.  Chest: Effort normal. Clear to auscultation throughout. No adventitious sounds.  Good air movement.  Extremities: No cyanosis, clubbing, erythema, nor edema.   Neurological: Alert and oriented x 3.   Psychiatric:  Behavior, mood, and affect are appropriate.       Assessment and Plan:     63 y.o. female with the following issues:    1. Viral respiratory illness     2. Persistent cough for 3 weeks or longer           Followup: Return if symptoms worsen or fail to improve.  "

## 2019-05-07 ENCOUNTER — OFFICE VISIT (OUTPATIENT)
Dept: MEDICAL GROUP | Facility: MEDICAL CENTER | Age: 64
End: 2019-05-07
Payer: COMMERCIAL

## 2019-05-07 VITALS
DIASTOLIC BLOOD PRESSURE: 60 MMHG | WEIGHT: 161 LBS | OXYGEN SATURATION: 96 % | SYSTOLIC BLOOD PRESSURE: 112 MMHG | BODY MASS INDEX: 26.82 KG/M2 | HEART RATE: 68 BPM | TEMPERATURE: 98.8 F | HEIGHT: 65 IN | RESPIRATION RATE: 14 BRPM

## 2019-05-07 DIAGNOSIS — R73.01 ELEVATED FASTING GLUCOSE: ICD-10-CM

## 2019-05-07 DIAGNOSIS — H60.391 ACUTE INFECTIVE OTITIS EXTERNA, RIGHT: ICD-10-CM

## 2019-05-07 DIAGNOSIS — R06.02 SHORTNESS OF BREATH: ICD-10-CM

## 2019-05-07 DIAGNOSIS — K21.9 GASTROESOPHAGEAL REFLUX DISEASE WITHOUT ESOPHAGITIS: ICD-10-CM

## 2019-05-07 DIAGNOSIS — E78.5 DYSLIPIDEMIA: ICD-10-CM

## 2019-05-07 PROBLEM — E03.9 HYPOTHYROIDISM: Status: RESOLVED | Noted: 2018-09-13 | Resolved: 2019-05-07

## 2019-05-07 PROBLEM — D49.7: Status: RESOLVED | Noted: 2018-10-07 | Resolved: 2019-05-07

## 2019-05-07 PROCEDURE — 99214 OFFICE O/P EST MOD 30 MIN: CPT | Performed by: FAMILY MEDICINE

## 2019-05-07 ASSESSMENT — PATIENT HEALTH QUESTIONNAIRE - PHQ9: CLINICAL INTERPRETATION OF PHQ2 SCORE: 0

## 2019-05-07 NOTE — PROGRESS NOTES
Chief Complaint   Patient presents with   • Shortness of Breath   • Hyperlipidemia   • Gastrophageal Reflux   • Ear Drainage       Subjective:     HPI:   Gerri Castelan presents today with the followin. Shortness of breath  She notes increased shortness of breath with exertion, new for her.  Walks frequently, notes a change.  Denies chest pain.  CXR order placed.  Last CXR in September was normal.  Lab orders discussed and placed.  Recent kidney function and thyroid tests normal.    2. Elevated fasting glucose  History of mildly elevated glucose, orders discussed and placed.    3. Dyslipidemia  Patient denies chest pain, chest pressure, palpitations or exertional shortness of breath. Patient denies muscle aches or muscle weakness from the atorvastatin medication. Patient is a never smoker. Patient takes no aspirin daily. Patient has no history of myocardial infarction, stroke or PVD.  Follow up lab orders discussed and placed.    4. Gastroesophageal reflux disease without esophagitis  The patient feels the current medication regimen of omeprazole is controlling the gastroesophageal reflux symptoms well. Denies dysphagia, reflux symptoms, acidity, abdominal pain or visible blood or mucus in the stool. Denies vomiting or hematemesis. Denies burping or abdominal bloating. Patient avoids nonsteroidal anti-inflammatory drugs. Avoids heavy meals or eating within 2 hours of bedtime. No longer continues baby aspirin.  Lab order discussed and placed.    5. Acute infective otitis externa, right  Had several days of strong right ear pain.  It then drained thin bloody yellow liquid, is feeling much better.  On examination today there is still inflammation, but appears mild.    Cannot have mammograms as has full breast reconstruction both sides.      Patient Active Problem List    Diagnosis Date Noted   • Acute infective otitis externa, right 2019   • Prolactinoma (HCC)    • Pituitary adenoma with  "extrasellar extension (HCC) 09/24/2018   • Pituitary macroadenoma ([facility]) 09/14/2018   • Elevated fasting glucose 12/30/2016   • Abnormal echocardiogram 11/17/2016   • Family history of early CAD 10/17/2016   • Dyslipidemia 09/26/2016   • Idiopathic hypothyroidism 09/26/2016   • Chronic right shoulder pain 09/22/2016   • Frequent loose stools 09/22/2016   • Chronic migraine without aura without status migrainosus, not intractable    • Melanoma ([facility]) 02/02/2011   • Personal history of breast cancer    • Family history of breast cancer in mother 12/01/2010   • GERD (gastroesophageal reflux disease) 05/19/2009       Current medicines (including changes today)  Current Outpatient Prescriptions   Medication Sig Dispense Refill   • bromocriptine (PARLODEL) 2.5 MG Tab TK 1 T PO BID  0   • atorvastatin (LIPITOR) 20 MG Tab Take 1 Tab by mouth every bedtime. 90 Tab 3   • levothyroxine (SYNTHROID) 50 MCG Tab Take 1 Tab by mouth every morning before breakfast. 90 Tab 3   • cholestyramine (QUESTRAN) 4 GM/DOSE powder Take 4 g by mouth every day. 540 g 3   • omeprazole (PRILOSEC) 40 MG delayed-release capsule Take 1 Cap by mouth every day. 90 Cap 3   • valacyclovir (VALTREX) 1 GM Tab Take 1 Tab by mouth 2 times a day. 20 Tab 3   • Probiotic Product (PROBIOTIC DAILY PO) Take 1 Cap by mouth every day.       No current facility-administered medications for this visit.        Allergies   Allergen Reactions   • Arimidex [Anastrozole] Swelling       ROS: As per HPI       Objective:     /60   Pulse 68   Temp 37.1 °C (98.8 °F)   Resp 14   Ht 1.651 m (5' 5\")   Wt 73 kg (161 lb)   SpO2 96%  Body mass index is 26.79 kg/m².    Physical Exam:  Constitutional: Well-developed and well-nourished. Not diaphoretic. No distress. Lucid and fluent.  Skin: Skin is warm and dry. No rash noted.  Head: Atraumatic without lesions.  Eyes: Conjunctivae and extraocular motions are normal. Pupils are equal, round, and reactive to light. No scleral " icterus.   Ears:  External ears unremarkable. Tympanic membranes clear and intact. Right ear canal erythema and mild edema, no drainage.  Mouth/Throat: Tongue normal. Oropharynx is clear and moist. Posterior pharynx without erythema or exudates.  Neck: Supple, trachea midline. No thyromegaly present. No cervical or supraclavicular lymphadenopathy. No JVD or carotid bruits appreciated  Cardiovascular: Regular rate and rhythm.  Normal S1, S2 without murmur appreciated.  Chest: Effort normal. Clear to auscultation throughout. No adventitious sounds.   Abdomen: Soft, non tender, and without distention. Active bowel sounds in all four quadrants. No rebound, guarding, masses or hepatosplenomegaly.  Extremities: No cyanosis, clubbing, erythema, nor edema.   Neurological: Alert and oriented x 3.   Psychiatric:  Behavior, mood, and affect are appropriate.       Assessment and Plan:     63 y.o. female with the following issues:    1. Shortness of breath  DX-CHEST-2 VIEWS    CBC WITHOUT DIFFERENTIAL   2. Elevated fasting glucose  Comp Metabolic Panel    HEMOGLOBIN A1C   3. Dyslipidemia  Comp Metabolic Panel    Lipid Profile   4. Gastroesophageal reflux disease without esophagitis  Comp Metabolic Panel    CBC WITHOUT DIFFERENTIAL   5. Acute infective otitis externa, right           Followup: Return in about 6 months (around 11/7/2019), or if symptoms worsen or fail to improve.

## 2019-05-24 ENCOUNTER — OFFICE VISIT (OUTPATIENT)
Dept: MEDICAL GROUP | Facility: MEDICAL CENTER | Age: 64
End: 2019-05-24
Payer: COMMERCIAL

## 2019-05-24 VITALS
OXYGEN SATURATION: 98 % | DIASTOLIC BLOOD PRESSURE: 62 MMHG | RESPIRATION RATE: 14 BRPM | TEMPERATURE: 98.5 F | HEART RATE: 70 BPM | BODY MASS INDEX: 26.66 KG/M2 | HEIGHT: 65 IN | SYSTOLIC BLOOD PRESSURE: 94 MMHG | WEIGHT: 160 LBS

## 2019-05-24 DIAGNOSIS — K60.0 ACUTE ANAL FISSURE: ICD-10-CM

## 2019-05-24 PROCEDURE — 99213 OFFICE O/P EST LOW 20 MIN: CPT | Performed by: FAMILY MEDICINE

## 2019-05-24 NOTE — PROGRESS NOTES
Chief Complaint   Patient presents with   • Hemorrhoids       Subjective:     HPI:   Gerri Castelan presents today with the followin. Acute anal fissure  Has had the problem with this around a month. Was severe, could not sit.  Has been using tucks, ice.  Using aloe ointment as well.  Did sitz once but just for a few minutes.  Has been having a lot more stools and stomach aches.  Had  In the past she had this a few days a year, triggered by constipation or frequent stools.  She had reduced the cholestyramine but is going back up as she has been having 4 to 5 stools per day.  She only had slight bleeding.        Patient Active Problem List    Diagnosis Date Noted   • Acute infective otitis externa, right 2019   • Prolactinoma (HCC)    • Pituitary adenoma with extrasellar extension (HCC) 2018   • Pituitary macroadenoma (HCC) 2018   • Elevated fasting glucose 2016   • Abnormal echocardiogram 2016   • Family history of early CAD 10/17/2016   • Dyslipidemia 2016   • Idiopathic hypothyroidism 2016   • Chronic right shoulder pain 2016   • Frequent loose stools 2016   • Chronic migraine without aura without status migrainosus, not intractable    • Melanoma (HCC) 2011   • Personal history of breast cancer    • Family history of breast cancer in mother 2010   • GERD (gastroesophageal reflux disease) 2009       Current medicines (including changes today)  Current Outpatient Prescriptions   Medication Sig Dispense Refill   • bromocriptine (PARLODEL) 2.5 MG Tab TK 1 T PO BID  0   • atorvastatin (LIPITOR) 20 MG Tab Take 1 Tab by mouth every bedtime. 90 Tab 3   • levothyroxine (SYNTHROID) 50 MCG Tab Take 1 Tab by mouth every morning before breakfast. 90 Tab 3   • cholestyramine (QUESTRAN) 4 GM/DOSE powder Take 4 g by mouth every day. 540 g 3   • omeprazole (PRILOSEC) 40 MG delayed-release capsule Take 1 Cap by mouth every day. 90 Cap 3   •  "valacyclovir (VALTREX) 1 GM Tab Take 1 Tab by mouth 2 times a day. 20 Tab 3   • Probiotic Product (PROBIOTIC DAILY PO) Take 1 Cap by mouth every day.       No current facility-administered medications for this visit.        Allergies   Allergen Reactions   • Arimidex [Anastrozole] Swelling       ROS: As per HPI       Objective:     BP (!) 94/62   Pulse 70   Temp 36.9 °C (98.5 °F)   Resp 14   Ht 1.651 m (5' 5\")   Wt 72.6 kg (160 lb)   SpO2 98%  Body mass index is 26.63 kg/m².    Physical Exam:  Constitutional: Well-developed and well-nourished. Not diaphoretic. No distress. Lucid and fluent.  Skin: Skin is warm and dry. No rash noted.  Head: Atraumatic without lesions.  Eyes: Conjunctivae and extraocular motions are normal. Pupils are equal, round, and reactive to light. No scleral icterus.   Neck: Supple, trachea midline. No thyromegaly present. No cervical or supraclavicular lymphadenopathy.  Cardiovascular: Regular rate and rhythm.  Normal S1, S2 without murmur appreciated.  Chest: Effort normal. Clear to auscultation throughout. No adventitious sounds.   Anus: posterior anal fissure and tender sentinel hemorrhoid are present.  No pus, no erythema.  Extremities: No cyanosis, clubbing, erythema, nor edema.   Neurological: Alert and oriented x 3.  Psychiatric:  Behavior, mood, and affect are appropriate.       Assessment and Plan:     63 y.o. female with the following issues:    1. Acute anal fissure           Followup: Return in about 4 months (around 9/24/2019), or if symptoms worsen or fail to improve.  "

## 2019-06-20 ENCOUNTER — HOSPITAL ENCOUNTER (OUTPATIENT)
Dept: LAB | Facility: MEDICAL CENTER | Age: 64
End: 2019-06-20
Attending: FAMILY MEDICINE
Payer: COMMERCIAL

## 2019-06-20 DIAGNOSIS — K21.9 GASTROESOPHAGEAL REFLUX DISEASE WITHOUT ESOPHAGITIS: ICD-10-CM

## 2019-06-20 DIAGNOSIS — R06.02 SHORTNESS OF BREATH: ICD-10-CM

## 2019-06-20 DIAGNOSIS — R73.01 ELEVATED FASTING GLUCOSE: ICD-10-CM

## 2019-06-20 DIAGNOSIS — E78.5 DYSLIPIDEMIA: ICD-10-CM

## 2019-06-20 LAB
ALBUMIN SERPL BCP-MCNC: 4.3 G/DL (ref 3.2–4.9)
ALBUMIN/GLOB SERPL: 1.9 G/DL
ALP SERPL-CCNC: 71 U/L (ref 30–99)
ALT SERPL-CCNC: 18 U/L (ref 2–50)
ANION GAP SERPL CALC-SCNC: 9 MMOL/L (ref 0–11.9)
AST SERPL-CCNC: 16 U/L (ref 12–45)
BILIRUB SERPL-MCNC: 0.7 MG/DL (ref 0.1–1.5)
BUN SERPL-MCNC: 21 MG/DL (ref 8–22)
CALCIUM SERPL-MCNC: 9.8 MG/DL (ref 8.5–10.5)
CHLORIDE SERPL-SCNC: 107 MMOL/L (ref 96–112)
CHOLEST SERPL-MCNC: 158 MG/DL (ref 100–199)
CO2 SERPL-SCNC: 25 MMOL/L (ref 20–33)
CREAT SERPL-MCNC: 0.9 MG/DL (ref 0.5–1.4)
ERYTHROCYTE [DISTWIDTH] IN BLOOD BY AUTOMATED COUNT: 48.2 FL (ref 35.9–50)
EST. AVERAGE GLUCOSE BLD GHB EST-MCNC: 123 MG/DL
FASTING STATUS PATIENT QL REPORTED: NORMAL
GLOBULIN SER CALC-MCNC: 2.3 G/DL (ref 1.9–3.5)
GLUCOSE SERPL-MCNC: 101 MG/DL (ref 65–99)
HBA1C MFR BLD: 5.9 % (ref 0–5.6)
HCT VFR BLD AUTO: 43.5 % (ref 37–47)
HDLC SERPL-MCNC: 57 MG/DL
HGB BLD-MCNC: 14.2 G/DL (ref 12–16)
LDLC SERPL CALC-MCNC: 77 MG/DL
MCH RBC QN AUTO: 30.8 PG (ref 27–33)
MCHC RBC AUTO-ENTMCNC: 32.6 G/DL (ref 33.6–35)
MCV RBC AUTO: 94.4 FL (ref 81.4–97.8)
PLATELET # BLD AUTO: 140 K/UL (ref 164–446)
PMV BLD AUTO: 12.4 FL (ref 9–12.9)
POTASSIUM SERPL-SCNC: 4.4 MMOL/L (ref 3.6–5.5)
PROT SERPL-MCNC: 6.6 G/DL (ref 6–8.2)
RBC # BLD AUTO: 4.61 M/UL (ref 4.2–5.4)
SODIUM SERPL-SCNC: 141 MMOL/L (ref 135–145)
TRIGL SERPL-MCNC: 119 MG/DL (ref 0–149)
WBC # BLD AUTO: 4.7 K/UL (ref 4.8–10.8)

## 2019-06-20 PROCEDURE — 36415 COLL VENOUS BLD VENIPUNCTURE: CPT

## 2019-06-20 PROCEDURE — 80061 LIPID PANEL: CPT

## 2019-06-20 PROCEDURE — 83036 HEMOGLOBIN GLYCOSYLATED A1C: CPT

## 2019-06-20 PROCEDURE — 80053 COMPREHEN METABOLIC PANEL: CPT

## 2019-06-20 PROCEDURE — 85027 COMPLETE CBC AUTOMATED: CPT

## 2019-07-01 ENCOUNTER — HOSPITAL ENCOUNTER (OUTPATIENT)
Dept: RADIOLOGY | Facility: MEDICAL CENTER | Age: 64
End: 2019-07-01
Attending: FAMILY MEDICINE
Payer: COMMERCIAL

## 2019-07-01 DIAGNOSIS — R06.02 SHORTNESS OF BREATH: ICD-10-CM

## 2019-07-01 PROCEDURE — 71046 X-RAY EXAM CHEST 2 VIEWS: CPT

## 2019-07-02 ENCOUNTER — APPOINTMENT (RX ONLY)
Dept: URBAN - METROPOLITAN AREA CLINIC 35 | Facility: CLINIC | Age: 64
Setting detail: DERMATOLOGY
End: 2019-07-02

## 2019-07-02 DIAGNOSIS — Z85.828 PERSONAL HISTORY OF OTHER MALIGNANT NEOPLASM OF SKIN: ICD-10-CM

## 2019-07-02 DIAGNOSIS — L82.1 OTHER SEBORRHEIC KERATOSIS: ICD-10-CM

## 2019-07-02 DIAGNOSIS — L81.4 OTHER MELANIN HYPERPIGMENTATION: ICD-10-CM

## 2019-07-02 DIAGNOSIS — Z85.820 PERSONAL HISTORY OF MALIGNANT MELANOMA OF SKIN: ICD-10-CM

## 2019-07-02 DIAGNOSIS — Z71.89 OTHER SPECIFIED COUNSELING: ICD-10-CM

## 2019-07-02 DIAGNOSIS — D22 MELANOCYTIC NEVI: ICD-10-CM

## 2019-07-02 PROBLEM — D22.4 MELANOCYTIC NEVI OF SCALP AND NECK: Status: ACTIVE | Noted: 2019-07-02

## 2019-07-02 PROCEDURE — 99213 OFFICE O/P EST LOW 20 MIN: CPT

## 2019-07-02 PROCEDURE — ? COUNSELING

## 2019-07-02 ASSESSMENT — LOCATION SIMPLE DESCRIPTION DERM
LOCATION SIMPLE: ABDOMEN
LOCATION SIMPLE: RIGHT SHOULDER
LOCATION SIMPLE: RIGHT BACK
LOCATION SIMPLE: RIGHT UPPER BACK
LOCATION SIMPLE: LEFT ANTERIOR NECK
LOCATION SIMPLE: POSTERIOR NECK

## 2019-07-02 ASSESSMENT — LOCATION ZONE DERM
LOCATION ZONE: NECK
LOCATION ZONE: ARM
LOCATION ZONE: TRUNK

## 2019-07-02 ASSESSMENT — LOCATION DETAILED DESCRIPTION DERM
LOCATION DETAILED: RIGHT POSTERIOR SHOULDER
LOCATION DETAILED: RIGHT LATERAL ABDOMEN
LOCATION DETAILED: RIGHT LATERAL TRAPEZIAL NECK
LOCATION DETAILED: LEFT SUPERIOR ANTERIOR NECK
LOCATION DETAILED: RIGHT SUPERIOR LATERAL UPPER BACK
LOCATION DETAILED: RIGHT SUPERIOR UPPER BACK

## 2019-07-08 ENCOUNTER — OFFICE VISIT (OUTPATIENT)
Dept: MEDICAL GROUP | Facility: MEDICAL CENTER | Age: 64
End: 2019-07-08
Payer: COMMERCIAL

## 2019-07-08 ENCOUNTER — HOSPITAL ENCOUNTER (OUTPATIENT)
Facility: MEDICAL CENTER | Age: 64
End: 2019-07-08
Attending: NURSE PRACTITIONER
Payer: COMMERCIAL

## 2019-07-08 VITALS
HEIGHT: 65 IN | WEIGHT: 160 LBS | DIASTOLIC BLOOD PRESSURE: 60 MMHG | OXYGEN SATURATION: 98 % | RESPIRATION RATE: 14 BRPM | BODY MASS INDEX: 26.66 KG/M2 | HEART RATE: 76 BPM | SYSTOLIC BLOOD PRESSURE: 106 MMHG | TEMPERATURE: 98.2 F

## 2019-07-08 DIAGNOSIS — N89.8 VAGINAL DISCHARGE: ICD-10-CM

## 2019-07-08 PROCEDURE — 87660 TRICHOMONAS VAGIN DIR PROBE: CPT

## 2019-07-08 PROCEDURE — 99213 OFFICE O/P EST LOW 20 MIN: CPT | Performed by: NURSE PRACTITIONER

## 2019-07-08 PROCEDURE — 87510 GARDNER VAG DNA DIR PROBE: CPT

## 2019-07-08 PROCEDURE — 87480 CANDIDA DNA DIR PROBE: CPT

## 2019-07-08 NOTE — PROGRESS NOTES
"CC: Vaginal Discharge (discharge x 1 week, no pelvic pain)        HPI:   Gerri is a Dr. Murillo patient, all problems are new to me today, presents today for the followin. Vaginal discharge  States for the last 7 days she is been having some vaginal discharge.  Initially started as fairly clear \"thought I peed myself\" however around a 3 it started to be brownish and enough that she needed to use a pad instead of a panty liner.  She also tried Monday using a tampon but due to vaginal dryness this was very uncomfortable and did cause some more reddish vaginal bleeding.  Has not noticed any brownish colored discharge today  No associated pelvic pain  Postmenopausal for 15 years    Current Outpatient Prescriptions   Medication Sig Dispense Refill   • bromocriptine (PARLODEL) 2.5 MG Tab TK 1 T PO BID  0   • atorvastatin (LIPITOR) 20 MG Tab Take 1 Tab by mouth every bedtime. 90 Tab 3   • levothyroxine (SYNTHROID) 50 MCG Tab Take 1 Tab by mouth every morning before breakfast. 90 Tab 3   • cholestyramine (QUESTRAN) 4 GM/DOSE powder Take 4 g by mouth every day. 540 g 3   • omeprazole (PRILOSEC) 40 MG delayed-release capsule Take 1 Cap by mouth every day. 90 Cap 3   • valacyclovir (VALTREX) 1 GM Tab Take 1 Tab by mouth 2 times a day. 20 Tab 3   • Probiotic Product (PROBIOTIC DAILY PO) Take 1 Cap by mouth every day.       No current facility-administered medications for this visit.      Social History   Substance Use Topics   • Smoking status: Never Smoker   • Smokeless tobacco: Never Used   • Alcohol use 0.6 oz/week     1 Standard drinks or equivalent per week      Comment: rarely     I reviewed patients allergies, problem list and medications today in [facility].    ROS: Any/all pertinent positives listed in the HPI, otherwise all others reviewed are negative today.        /60 (BP Location: Right arm, Patient Position: Sitting, BP Cuff Size: Adult)   Pulse 76   Temp 36.8 °C (98.2 °F) (Temporal)   Resp 14   Ht 1.651 m " "(5' 5\")   Wt 72.6 kg (160 lb)   LMP 10/30/2004   SpO2 98%   BMI 26.63 kg/m²       Physical Exam:    Gen:         Alert and oriented, No apparent distress. WDWN  Psych:     A+Ox3, normal affect and mood  Skin:     Warm, dry and intact. Good turgor       No rashes in visible areas.  Eye:        Conjunctiva clear, lids normal  ENMT:     Lips without lesions, good dentition  Lungs:    Unlabored respiratory effort    Vulva:       No lesions/erythema-atrophic vault: No erythema/odor.  Appears to be mildly atrophic.  Trace amount of clearish whitish thin discharge.  No odor  Cervix:     No lesions/ No CMT      Uterus: 7 cm; midline; nontender to bimanual; no adnexal masses or tenderness        Assessment and Plan.   63 y.o. female here for the followin. Vaginal discharge  Stable.  Fairly benign exam today.  Pelvic ultrasound ordered given a chance for postmenopausal bleeding.  Vaginal pathogen swab ordered as well.  We will treating accordingly depending on those results.  Her symptoms appear to be resolving however if they worsen or change she will make sure she touches base with the office.  - US-PELVIC TRANSVAGINAL ONLY; Future  - VAGINAL PATHOGENS DNA PANEL; Future            "

## 2019-07-09 ENCOUNTER — TELEPHONE (OUTPATIENT)
Dept: MEDICAL GROUP | Facility: MEDICAL CENTER | Age: 64
End: 2019-07-09

## 2019-07-09 LAB
CANDIDA DNA VAG QL PROBE+SIG AMP: NEGATIVE
G VAGINALIS DNA VAG QL PROBE+SIG AMP: POSITIVE
T VAGINALIS DNA VAG QL PROBE+SIG AMP: NEGATIVE

## 2019-07-09 RX ORDER — METRONIDAZOLE 500 MG/1
500 TABLET ORAL 2 TIMES DAILY
Qty: 14 TAB | Refills: 0 | Status: SHIPPED | OUTPATIENT
Start: 2019-07-09 | End: 2019-07-16

## 2019-07-09 NOTE — TELEPHONE ENCOUNTER
pls call Gerri.  I did send her my chart of the same so if she already sought that result that is okay.    Vaginal swab in the office did show a change in bacteria-bacterial vaginosis.  I will send a prescription of Flagyl to take twice daily for a week.  That should resolve the symptoms.  I still want her to do the pelvic ultrasound just to verify as there may have been some blood in her discharge    Sent to [facility] [address]

## 2019-07-16 ENCOUNTER — OFFICE VISIT (OUTPATIENT)
Dept: MEDICAL GROUP | Facility: MEDICAL CENTER | Age: 64
End: 2019-07-16
Payer: COMMERCIAL

## 2019-07-16 VITALS
WEIGHT: 162 LBS | HEIGHT: 65 IN | OXYGEN SATURATION: 98 % | SYSTOLIC BLOOD PRESSURE: 106 MMHG | RESPIRATION RATE: 16 BRPM | BODY MASS INDEX: 26.99 KG/M2 | TEMPERATURE: 98.7 F | DIASTOLIC BLOOD PRESSURE: 72 MMHG | HEART RATE: 82 BPM

## 2019-07-16 DIAGNOSIS — B95.8 STAPHYLOCOCCAL INFECTION OF SKIN: ICD-10-CM

## 2019-07-16 DIAGNOSIS — L08.9 STAPHYLOCOCCAL INFECTION OF SKIN: ICD-10-CM

## 2019-07-16 PROCEDURE — 99213 OFFICE O/P EST LOW 20 MIN: CPT | Performed by: FAMILY MEDICINE

## 2019-07-16 RX ORDER — SULFAMETHOXAZOLE AND TRIMETHOPRIM 800; 160 MG/1; MG/1
1 TABLET ORAL 2 TIMES DAILY
Qty: 14 TAB | Refills: 0 | Status: SHIPPED | OUTPATIENT
Start: 2019-07-16 | End: 2019-07-23

## 2019-07-16 NOTE — PROGRESS NOTES
Chief Complaint   Patient presents with   • Insect Bite     3 bites on left leg       Subjective:     HPI:   Gerri Castelan presents today with the followin. Staphylococcal infection of skin  Skin lesions appeared a week ago, no cause.  Two lesions left medial ankle.  A posterior calf lesion was first. These had a yellow head, it came off when she used peroxide.  The bites appeared over three days.  She also tried neosporin, no help.  They are less swollen but still painful.        Patient Active Problem List    Diagnosis Date Noted   • Acute infective otitis externa, right 2019   • Prolactinoma (HCC)    • Pituitary adenoma with extrasellar extension ([facility]) 2018   • Pituitary macroadenoma ([facility]) 2018   • Elevated fasting glucose 2016   • Abnormal echocardiogram 2016   • Family history of early CAD 10/17/2016   • Dyslipidemia 2016   • Idiopathic hypothyroidism 2016   • Chronic right shoulder pain 2016   • Frequent loose stools 2016   • Chronic migraine without aura without status migrainosus, not intractable    • Melanoma ([facility]) 2011   • Personal history of breast cancer    • Family history of breast cancer in mother 2010   • GERD (gastroesophageal reflux disease) 2009       Current medicines (including changes today)  Current Outpatient Prescriptions   Medication Sig Dispense Refill   • sulfamethoxazole-trimethoprim (BACTRIM DS) 800-160 MG tablet Take 1 Tab by mouth 2 times a day for 7 days. 14 Tab 0   • mupirocin (BACTROBAN) 2 % Ointment Apply to affected area tid 1 Tube 0   • metroNIDAZOLE (FLAGYL) 500 MG Tab Take 1 Tab by mouth 2 Times a Day for 7 days. 14 Tab 0   • bromocriptine (PARLODEL) 2.5 MG Tab TK 1 T PO BID  0   • atorvastatin (LIPITOR) 20 MG Tab Take 1 Tab by mouth every bedtime. 90 Tab 3   • levothyroxine (SYNTHROID) 50 MCG Tab Take 1 Tab by mouth every morning before breakfast. 90 Tab 3   • cholestyramine (QUESTRAN) 4  "GM/DOSE powder Take 4 g by mouth every day. 540 g 3   • omeprazole (PRILOSEC) 40 MG delayed-release capsule Take 1 Cap by mouth every day. 90 Cap 3   • valacyclovir (VALTREX) 1 GM Tab Take 1 Tab by mouth 2 times a day. 20 Tab 3   • Probiotic Product (PROBIOTIC DAILY PO) Take 1 Cap by mouth every day.       No current facility-administered medications for this visit.        Allergies   Allergen Reactions   • Arimidex [Anastrozole] Swelling       ROS: As per HPI       Objective:     /72 (BP Location: Left arm)   Pulse 82   Temp 37.1 °C (98.7 °F)   Resp 16   Ht 1.651 m (5' 5\")   Wt 73.5 kg (162 lb)   SpO2 98%  Body mass index is 26.96 kg/m².    Physical Exam:  Constitutional: Well-developed and well-nourished. Not diaphoretic. No distress. Lucid and fluent.  Skin: Skin is warm and dry. No rash noted.  Three lesions on left leg.  One is 1 cm, posterior calf, two are medial ankle with erythema and tenderness both of those a little less than a centimeter center with surrounding erythema.  Head: Atraumatic without lesions.  Eyes: Conjunctivae and extraocular motions are normal. Pupils are equal, round, and reactive to light. No scleral icterus.   Mouth/Throat: Tongue normal. Oropharynx is clear and moist. Posterior pharynx without erythema or exudates.  Neck: Supple, trachea midline. No thyromegaly present. No cervical or supraclavicular lymphadenopathy. No JVD or carotid bruits appreciated  Cardiovascular: Regular rate and rhythm.  Normal S1, S2 without murmur appreciated.  Chest: Effort normal. Clear to auscultation throughout. No adventitious sounds.   Extremities: No cyanosis, clubbing, erythema, nor edema.   Neurological: Alert and oriented x 3.   Psychiatric:  Behavior, mood, and affect are appropriate.       Assessment and Plan:     63 y.o. female with the following issues:    1. Staphylococcal infection of skin  sulfamethoxazole-trimethoprim (BACTRIM DS) 800-160 MG tablet    mupirocin (BACTROBAN) 2 % " Ointment    DISCONTINUED: mupirocin (BACTROBAN) 2 % Ointment         Followup: Return if symptoms worsen or fail to improve.

## 2019-07-22 NOTE — PROGRESS NOTES
Endocrinology Clinic Progress Note  PCP: Davidson Murillo M.D.    HPI:  Gerri Castelan is a 63 y.o. old patient who comes in today for evaluation of benign neoplasm of pituitary and idiopathic hypothyroid and hyperprolactemia.   She is currently on Levothyroxine  50 mcg per day for the thyroid.       Ref. Range 2/8/2019 09:08   TSH Latest Ref Range: 0.380 - 5.330 uIU/mL 1.260   Free T-4 Latest Ref Range: 0.53 - 1.43 ng/dL 0.97       She is on the Bromocriptine 2.5 mg bid for the High prolactin level since around October 2018.  Most recent MRI of the pituitary did not reveal any pituitary tumor.     Ref. Range 10/8/2018 09:14 12/19/2018 09:20 2/8/2019 09:08   Prolactin Latest Ref Range: 2.80 - 26.00 ng/mL 889.05 (H) 165.46 (H) 28.52 (H)       ROS:  Constitutional: No weight loss  Cardiac: No palpitations or racing heart  Resp: No shortness of breath  Neuro: No numbness or tinging in feet  Skin: increased hot flashes interfering with her quality of life  Endo: No heat or cold intolerance, no polyuria or polydipsia  All other systems were reviewed and were negative.    Past Medical History:  Patient Active Problem List    Diagnosis Date Noted   • Acute infective otitis externa, right 05/07/2019   • Prolactinoma (HCC)    • Pituitary adenoma with extrasellar extension (HCC) 09/24/2018   • Pituitary macroadenoma (HCC) 09/14/2018   • Elevated fasting glucose 12/30/2016   • Abnormal echocardiogram 11/17/2016   • Family history of early CAD 10/17/2016   • Dyslipidemia 09/26/2016   • Idiopathic hypothyroidism 09/26/2016   • Chronic right shoulder pain 09/22/2016   • Frequent loose stools 09/22/2016   • Chronic migraine without aura without status migrainosus, not intractable    • Melanoma (HCC) 02/02/2011   • Personal history of breast cancer    • Family history of breast cancer in mother 12/01/2010   • GERD (gastroesophageal reflux disease) 05/19/2009       Past Surgical History:  Past Surgical History:   Procedure  Laterality Date   • ANAIS BY LAPAROSCOPY  12/6/2012    Performed by Yessica Levin M.D. at SURGERY [facility]   • NIPPLE RECONSTRUCTION  12/1/2011    Performed by PATEL DALTON at SURGERY [facility]   • FLAP GRAFT  12/1/2011    Performed by PATEL DALTON at SURGERY [facility]   • BREAST RECONSTRUCTION  7/15/2011    Performed by PATEL DALTON at SURGERY [facility]   • TISSUE EXPANDER PLACE/REMOVE  7/15/2011    Performed by PATEL DALTON at SURGERY [facility]   • BREAST IMPLANT REVISION  7/15/2011    Performed by PATEL DALTON at SURGERY [facility]   • CAPSULECTOMY  7/15/2011    Performed by PATEL DALTON at SURGERY [facility]   • TISSUE TRANSFER/REARRANGE  7/15/2011    Performed by PATEL DALTON at [facility]   • MASTECTOMY  3/29/2011    Performed by YESSICA LEVIN at SURGERY [facility]   • BREAST RECONSTRUCTION  3/29/2011    Performed by PATEL DALTON at SURGERY [facility]   • TISSUE EXPANDER PLACE/REMOVE  3/29/2011    Performed by PATEL DALTON at SURGERY [facility]   • BREAST BIOPSY  2/24/2011    Performed by YESSICA LEVIN at SURGERY SAME DAY [facility]   • LUMPECTOMY  1/19/2011    Performed by YESSICA LEVIN at SURGERY SAME DAY [facility]   • NODE BIOPSY SENTINEL  1/19/2011    Performed by YESSICA LEVIN at SURGERY SAME DAY [facility]   • COLONOSCOPY  2/2005    normal, due 2015   • OTHER ORTHOPEDIC SURGERY  1997    knee surgery ACL repair   • TUBAL LIGATION  1987   • OTHER  1983    malignant melanoma skin cancer upper back       Allergies:  Arimidex [anastrozole]    Social History:  Social History     Social History   • Marital status:      Spouse name: N/A   • Number of children: N/A   • Years of education: N/A     Occupational History   • Not on file.     Social History Main Topics   • Smoking status: Never Smoker   • Smokeless tobacco: Never Used   • Alcohol use 0.6 oz/week     1 Standard  "drinks or equivalent per week      Comment: rarely   • Drug use: No   • Sexual activity: Yes     Partners: Male     Other Topics Concern   • Not on file     Social History Narrative   • No narrative on file       Family History:  Family History   Problem Relation Age of Onset   • Cancer Mother 60        breast   • Hyperlipidemia Mother    • Dementia Mother    • Heart Attack Brother         MI at age 50   • Heart Attack Sister         MI at age 52   • Heart Attack Maternal Grandfather         MI ta ge 65   • Heart Disease Sister 52        Down syndrome with heart murmur   • Diabetes Brother 54       Medications:    Current Outpatient Prescriptions:   •  Wheat Dextrin (BENEFIBER PO), Take  by mouth., Disp: , Rfl:   •  bromocriptine (PARLODEL) 2.5 MG Tab, TK 1 T PO BID, Disp: , Rfl: 0  •  atorvastatin (LIPITOR) 20 MG Tab, Take 1 Tab by mouth every bedtime., Disp: 90 Tab, Rfl: 3  •  levothyroxine (SYNTHROID) 50 MCG Tab, Take 1 Tab by mouth every morning before breakfast., Disp: 90 Tab, Rfl: 3  •  cholestyramine (QUESTRAN) 4 GM/DOSE powder, Take 4 g by mouth every day., Disp: 540 g, Rfl: 3  •  omeprazole (PRILOSEC) 40 MG delayed-release capsule, Take 1 Cap by mouth every day., Disp: 90 Cap, Rfl: 3  •  Probiotic Product (PROBIOTIC DAILY PO), Take 1 Cap by mouth every day., Disp: , Rfl:   •  mupirocin (BACTROBAN) 2 % Ointment, Apply to affected area tid, Disp: 1 Tube, Rfl: 0  •  valacyclovir (VALTREX) 1 GM Tab, Take 1 Tab by mouth 2 times a day., Disp: 20 Tab, Rfl: 3    Labs: Reviewed    Physical Examination:  Vital signs: /64   Pulse 82   Ht 1.651 m (5' 5\")   Wt 72.6 kg (160 lb)   LMP 10/30/2004   SpO2 94%   BMI 26.63 kg/m²   Body mass index is 26.63 kg/m².  General: No apparent distress, cooperative  Eyes: No scleral icterus or discharge  ENMT: Normal on external inspection of nose, lips, normal thyroid exam  Neck: No abnormal masses on inspection  Resp: Normal effort, clear to auscultation bilaterally   CVS: " Regular rate and rhythm, S1 S2 normal, no murmur   Extremities: No edema  Abdomen: abdominal obesity present  Neuro: Alert and oriented  Skin: No rash  Psych: Normal mood and affect, intact memory and able to make informed decisions      Assessment and Plan:  1.  hypothyroidism  New current dose of levothyroxine.  We will repeat the labs and adjust the dose accordingly    2. Prolactinoma (HCC)  Change in bromocriptine therapy.    3. Pituitary macroadenoma (HCC)  No identifiable micro-or macroadenoma as per the most recent MRI of the pituitary    4. Hot flashes:  This is secondary to her menopause.  She does have a history of breast cancer.  I suggested to the patient to discuss with the oncologist to see if it would be okay to start low-dose estrogen progesterone to help with the hot flash and to get other benefits of estradiol supplementation without increasing risk of her type of breast cancer.    Return in about 2 months (around 9/24/2019).    Thank you for allowing me to participate in the care of this patient.    Amos Flores M.D.  07/22/19    CC:   Davidson Murillo M.D.    This note was created using voice recognition software (Dragon). The accuracy of the dictation is limited by the abilities of the software. I have reviewed the note prior to signing, however some errors in grammar and context are still possible. If you have any questions related to this note please do not hesitate to contact our office.   This note was scribed by Davina Aldana RN, CDE

## 2019-07-24 ENCOUNTER — HOSPITAL ENCOUNTER (OUTPATIENT)
Dept: RADIOLOGY | Facility: MEDICAL CENTER | Age: 64
End: 2019-07-24
Attending: NURSE PRACTITIONER
Payer: COMMERCIAL

## 2019-07-24 ENCOUNTER — OFFICE VISIT (OUTPATIENT)
Dept: ENDOCRINOLOGY | Facility: MEDICAL CENTER | Age: 64
End: 2019-07-24
Payer: COMMERCIAL

## 2019-07-24 VITALS
WEIGHT: 160 LBS | OXYGEN SATURATION: 94 % | BODY MASS INDEX: 26.66 KG/M2 | DIASTOLIC BLOOD PRESSURE: 64 MMHG | SYSTOLIC BLOOD PRESSURE: 102 MMHG | HEART RATE: 82 BPM | HEIGHT: 65 IN

## 2019-07-24 DIAGNOSIS — E55.9 VITAMIN D DEFICIENCY: ICD-10-CM

## 2019-07-24 DIAGNOSIS — D35.2 PITUITARY MACROADENOMA (HCC): ICD-10-CM

## 2019-07-24 DIAGNOSIS — N89.8 VAGINAL DISCHARGE: ICD-10-CM

## 2019-07-24 DIAGNOSIS — D35.2 PROLACTINOMA (HCC): ICD-10-CM

## 2019-07-24 DIAGNOSIS — E53.8 VITAMIN B 12 DEFICIENCY: ICD-10-CM

## 2019-07-24 DIAGNOSIS — R53.83 FATIGUE, UNSPECIFIED TYPE: ICD-10-CM

## 2019-07-24 DIAGNOSIS — E03.9 IDIOPATHIC HYPOTHYROIDISM: ICD-10-CM

## 2019-07-24 PROCEDURE — 99204 OFFICE O/P NEW MOD 45 MIN: CPT | Performed by: INTERNAL MEDICINE

## 2019-07-24 PROCEDURE — 76830 TRANSVAGINAL US NON-OB: CPT

## 2019-07-30 ENCOUNTER — TELEPHONE (OUTPATIENT)
Dept: ENDOCRINOLOGY | Facility: MEDICAL CENTER | Age: 64
End: 2019-07-30

## 2019-07-30 NOTE — TELEPHONE ENCOUNTER
VOICEMAIL  1. Caller Name: Gerri Castelan                      Call Back Number: [phone] (home)     2. Message: Dr. SARAH was wondering who her last Oncologyst was and asked that she call back with that information. She was seeing Dr Zimmerman but he has retired. If we need her notes from that office the phone number is [phone].     3. Patient approves office to leave a detailed voicemail/MyChart message: yes

## 2019-07-31 DIAGNOSIS — K64.1 GRADE II HEMORRHOIDS: ICD-10-CM

## 2019-08-01 NOTE — TELEPHONE ENCOUNTER
Phone Number Called: [phone] (home)     Call outcome: left message for patient to call back regarding message below    Message: informed Pt that we need a GAMAL to get  Info from that office for Dr. Flores.

## 2019-08-26 ENCOUNTER — HOSPITAL ENCOUNTER (OUTPATIENT)
Dept: LAB | Facility: MEDICAL CENTER | Age: 64
End: 2019-08-26
Attending: INTERNAL MEDICINE
Payer: COMMERCIAL

## 2019-08-26 DIAGNOSIS — D35.2 PROLACTINOMA (HCC): ICD-10-CM

## 2019-08-26 DIAGNOSIS — E55.9 VITAMIN D DEFICIENCY: ICD-10-CM

## 2019-08-26 DIAGNOSIS — E53.8 VITAMIN B 12 DEFICIENCY: ICD-10-CM

## 2019-08-26 DIAGNOSIS — E03.9 IDIOPATHIC HYPOTHYROIDISM: ICD-10-CM

## 2019-08-26 PROCEDURE — 84481 FREE ASSAY (FT-3): CPT

## 2019-08-26 PROCEDURE — 84480 ASSAY TRIIODOTHYRONINE (T3): CPT

## 2019-08-26 PROCEDURE — 84443 ASSAY THYROID STIM HORMONE: CPT

## 2019-08-26 PROCEDURE — 82306 VITAMIN D 25 HYDROXY: CPT

## 2019-08-26 PROCEDURE — 84146 ASSAY OF PROLACTIN: CPT

## 2019-08-26 PROCEDURE — 36415 COLL VENOUS BLD VENIPUNCTURE: CPT

## 2019-08-26 PROCEDURE — 84439 ASSAY OF FREE THYROXINE: CPT

## 2019-08-26 PROCEDURE — 82607 VITAMIN B-12: CPT

## 2019-08-26 PROCEDURE — 86376 MICROSOMAL ANTIBODY EACH: CPT

## 2019-08-27 LAB
25(OH)D3 SERPL-MCNC: 22 NG/ML (ref 30–100)
PROLACTIN SERPL-MCNC: 6.06 NG/ML (ref 2.8–26)
T3 SERPL-MCNC: 114 NG/DL (ref 60–181)
T3FREE SERPL-MCNC: 3.56 PG/ML (ref 2.4–4.2)
T4 FREE SERPL-MCNC: 0.94 NG/DL (ref 0.53–1.43)
THYROPEROXIDASE AB SERPL-ACNC: 0.5 IU/ML (ref 0–9)
TSH SERPL DL<=0.005 MIU/L-ACNC: 1.23 UIU/ML (ref 0.38–5.33)
VIT B12 SERPL-MCNC: 253 PG/ML (ref 211–911)

## 2019-09-02 NOTE — PROGRESS NOTES
Endocrinology Clinic Progress Note  PCP: Davidson Murillo M.D.    HPI:  Gerri Castelan is a 63 y.o. old patient who comes in today for review of endocrine problems.    Hypothyroidism:  Currently taking Levothyroxine 50 mcg daily.  Labs done on 8/26/19 shows a TSH of 1.23, FT4 of .94, T3 of 114, and FT3 of 3.56.    Hyperprolactin:  Currently taking Bromocriptine 2.5 mg BID. Pituitary M.R.I. done on 2/13/19 shows Pituitary gland is mildly prominent although significantly reduced in size from prior exam.  Superior margin is no longer convex.  The pituitary tumor that was invading into the cavernous sinus has disappeared.    Vitamin D Deficiency:  Recent lab done on 8/26/19 shows a Vitamin D level of 22.  She is not currently taking Vitamin D supplementation.    Vitamin B 12 Deficiency:  Vitamin B 12 level on 8/26/19 was 253.  Currently not taking any supplementation.  She is tired and having problems with night sweats and hot flashes.     ROS:  Constitutional: No unintentional weight loss  Endo: Denies excessive thirst or frequent urination  All other systems were reviewed and were negative.    Past Medical History:  Patient Active Problem List    Diagnosis Date Noted   • Acute infective otitis externa, right 05/07/2019   • Prolactinoma (HCC)    • Pituitary adenoma with extrasellar extension (HCC) 09/24/2018   • Pituitary macroadenoma (HCC) 09/14/2018   • Elevated fasting glucose 12/30/2016   • Abnormal echocardiogram 11/17/2016   • Family history of early CAD 10/17/2016   • Dyslipidemia 09/26/2016   • Idiopathic hypothyroidism 09/26/2016   • Chronic right shoulder pain 09/22/2016   • Frequent loose stools 09/22/2016   • Chronic migraine without aura without status migrainosus, not intractable    • Melanoma (HCC) 02/02/2011   • Personal history of breast cancer    • Family history of breast cancer in mother 12/01/2010   • GERD (gastroesophageal reflux disease) 05/19/2009       Medications:    Current Outpatient  "Medications:   •  clindamycin (CLEOCIN) 300 MG Cap, TK ONE C PO  TID TAT, Disp: , Rfl: 0  •  Wheat Dextrin (BENEFIBER PO), Take  by mouth., Disp: , Rfl:   •  bromocriptine (PARLODEL) 2.5 MG Tab, TK 1 T PO BID, Disp: , Rfl: 0  •  levothyroxine (SYNTHROID) 50 MCG Tab, Take 1 Tab by mouth every morning before breakfast., Disp: 90 Tab, Rfl: 3  •  omeprazole (PRILOSEC) 40 MG delayed-release capsule, Take 1 Cap by mouth every day., Disp: 90 Cap, Rfl: 3  •  valacyclovir (VALTREX) 1 GM Tab, Take 1 Tab by mouth 2 times a day., Disp: 20 Tab, Rfl: 3  •  Probiotic Product (PROBIOTIC DAILY PO), Take 1 Cap by mouth every day., Disp: , Rfl:   •  atorvastatin (LIPITOR) 20 MG Tab, Take 1 Tab by mouth every bedtime., Disp: 90 Tab, Rfl: 3    Labs: Reviewed    Physical Examination:  Vital signs: /60   Pulse 90   Ht 1.651 m (5' 5\")   Wt 73.5 kg (162 lb)   LMP 10/30/2004   SpO2 91%   BMI 26.96 kg/m²  Body mass index is 26.96 kg/m². Patient's body mass index is 26.96 kg/m². Exercise and nutrition counseling were performed at this visit.  General: No apparent distress, cooperative  Eyes: No scleral icterus, no discharge, normal eyelids  Neck: No abnormal masses on inspection, normal thyroid exam  Resp: Normal effort, clear to auscultation bilaterally  CVS: Regular rate and rhythm, S1 S2 normal, no murmur  Extremities: No lower extremity edema  Abdomen: abdominal obesity present  Musculoskeletal: Normal digits and nails  Skin: No rash on visible skin  Psych: Alert and oriented, normal mood and affect, intact memory and able to make informed decisions.    Assessment and Plan:    1. Idiopathic hypothyroidism  Continue current dose of levothyroxine    2. Prolactinoma (HCC)  Prolactin levels have normalized.  Continue bromocriptine therapy.  Even though prolactinoma that was invading into cavernous sinus has disappeared    3. Vitamin D deficiency  Recommend vitamin D 10,000 units daily with food    4. Vitamin B 12 " deficiency  continue vitamin B12 sublingual 2500 mcg daily.    Return in about 3 months (around 12/3/2019).    Thank you for allowing me to participate in the care of this patient.    Amos Flores M.D.  This note was scribed by Aliyah Crawford RN CDE  CC:   Davidson Murillo M.D.    This note was created using voice recognition software (Dragon). The accuracy of the dictation is limited by the abilities of the software. I have reviewed the note prior to signing, however some errors in grammar and context are still possible. If you have any questions related to this note please do not hesitate to contact our office.

## 2019-09-03 ENCOUNTER — OFFICE VISIT (OUTPATIENT)
Dept: MEDICAL GROUP | Facility: MEDICAL CENTER | Age: 64
End: 2019-09-03
Payer: COMMERCIAL

## 2019-09-03 ENCOUNTER — OFFICE VISIT (OUTPATIENT)
Dept: ENDOCRINOLOGY | Facility: MEDICAL CENTER | Age: 64
End: 2019-09-03
Payer: COMMERCIAL

## 2019-09-03 VITALS
OXYGEN SATURATION: 91 % | HEIGHT: 65 IN | DIASTOLIC BLOOD PRESSURE: 60 MMHG | WEIGHT: 162 LBS | BODY MASS INDEX: 26.99 KG/M2 | HEART RATE: 90 BPM | SYSTOLIC BLOOD PRESSURE: 102 MMHG

## 2019-09-03 VITALS
SYSTOLIC BLOOD PRESSURE: 114 MMHG | OXYGEN SATURATION: 92 % | DIASTOLIC BLOOD PRESSURE: 60 MMHG | HEART RATE: 67 BPM | TEMPERATURE: 98.4 F | RESPIRATION RATE: 14 BRPM | HEIGHT: 65 IN | BODY MASS INDEX: 26.66 KG/M2 | WEIGHT: 160 LBS

## 2019-09-03 DIAGNOSIS — E53.8 VITAMIN B 12 DEFICIENCY: ICD-10-CM

## 2019-09-03 DIAGNOSIS — E55.9 VITAMIN D DEFICIENCY: ICD-10-CM

## 2019-09-03 DIAGNOSIS — D35.2 PROLACTINOMA (HCC): ICD-10-CM

## 2019-09-03 DIAGNOSIS — N95.1 MENOPAUSAL SYMPTOM: ICD-10-CM

## 2019-09-03 DIAGNOSIS — E03.9 IDIOPATHIC HYPOTHYROIDISM: ICD-10-CM

## 2019-09-03 PROCEDURE — 99213 OFFICE O/P EST LOW 20 MIN: CPT | Performed by: FAMILY MEDICINE

## 2019-09-03 PROCEDURE — 99214 OFFICE O/P EST MOD 30 MIN: CPT | Performed by: INTERNAL MEDICINE

## 2019-09-03 RX ORDER — VENLAFAXINE HYDROCHLORIDE 37.5 MG/1
37.5 CAPSULE, EXTENDED RELEASE ORAL DAILY
Qty: 30 CAP | Refills: 3 | Status: SHIPPED | OUTPATIENT
Start: 2019-09-03 | End: 2019-09-03

## 2019-09-03 RX ORDER — CLINDAMYCIN HYDROCHLORIDE 300 MG/1
CAPSULE ORAL
Refills: 0 | COMMUNITY
Start: 2019-08-23 | End: 2019-11-25

## 2019-09-03 NOTE — PROGRESS NOTES
Chief Complaint   Patient presents with   • Menopause   • Fatigue       Subjective:     HPI:   Gerri Castelan presents today with the followin. Menopausal symptom  In the last year her hot flashes are much worse, she stopped tamoixfen in 2016 after 5 years.  Is having a lot of hot flashes currently.  Unclear to me why.  Unfortunately the breast cancer showed 91% estrogen receptors positive so we need to stay away from estrogen supplementation.  We will try low dose venlafaxine.    Stopped the cholestyramine and is using the fiber only with overall better results.      Patient Active Problem List    Diagnosis Date Noted   • Acute infective otitis externa, right 2019   • Prolactinoma (HCC)    • Pituitary adenoma with extrasellar extension ([facility]) 2018   • Pituitary macroadenoma ([facility]) 2018   • Elevated fasting glucose 2016   • Abnormal echocardiogram 2016   • Family history of early CAD 10/17/2016   • Dyslipidemia 2016   • Idiopathic hypothyroidism 2016   • Chronic right shoulder pain 2016   • Frequent loose stools 2016   • Chronic migraine without aura without status migrainosus, not intractable    • Melanoma ([facility]) 2011   • Personal history of breast cancer    • Family history of breast cancer in mother 2010   • GERD (gastroesophageal reflux disease) 2009       Current medicines (including changes today)  Current Outpatient Medications   Medication Sig Dispense Refill   • venlafaxine XR (EFFEXOR XR) 37.5 MG CAPSULE SR 24 HR Take 1 Cap by mouth every day. 30 Cap 3   • clindamycin (CLEOCIN) 300 MG Cap TK ONE C PO  TID TAT  0   • hydrocortisone rectal (PROCTOZONE-HC) 2.5 % Cream Please apply 1 g rectally nightly 30 g 0   • Wheat Dextrin (BENEFIBER PO) Take  by mouth.     • mupirocin (BACTROBAN) 2 % Ointment Apply to affected area tid 1 Tube 0   • bromocriptine (PARLODEL) 2.5 MG Tab TK 1 T PO BID  0   • atorvastatin (LIPITOR) 20 MG Tab  "Take 1 Tab by mouth every bedtime. 90 Tab 3   • levothyroxine (SYNTHROID) 50 MCG Tab Take 1 Tab by mouth every morning before breakfast. 90 Tab 3   • omeprazole (PRILOSEC) 40 MG delayed-release capsule Take 1 Cap by mouth every day. 90 Cap 3   • valacyclovir (VALTREX) 1 GM Tab Take 1 Tab by mouth 2 times a day. 20 Tab 3   • Probiotic Product (PROBIOTIC DAILY PO) Take 1 Cap by mouth every day.       No current facility-administered medications for this visit.        Allergies   Allergen Reactions   • Amoxicillin      Headache     • Arimidex [Anastrozole] Swelling       ROS: As per HPI       Objective:     /60   Pulse 67   Temp 36.9 °C (98.4 °F)   Resp 14   Ht 1.651 m (5' 5\")   Wt 72.6 kg (160 lb)   SpO2 92%  Body mass index is 26.63 kg/m².    Physical Exam:  Constitutional: Well-developed and well-nourished. Not diaphoretic. No distress. Lucid and fluent.  Skin: Skin is warm and dry. No rash noted.  Head: Atraumatic without lesions.  Eyes: Conjunctivae and extraocular motions are normal. Pupils are equal, round, and reactive to light. No scleral icterus.   Neck: Supple, trachea midline. No thyromegaly present. No cervical or supraclavicular lymphadenopathy. No JVD or carotid bruits appreciated  Cardiovascular: Regular rate and rhythm.  Normal S1, S2 without murmur appreciated.  Chest: Effort normal. Clear to auscultation throughout. No adventitious sounds.   Abdomen: Soft, non tender, and without distention. Active bowel sounds in all four quadrants. No rebound, guarding, masses or hepatosplenomegaly.  Extremities: No cyanosis, clubbing, erythema, nor edema.   Neurological: Alert and oriented x 3.   Psychiatric:  Behavior, mood, and affect are appropriate.       Assessment and Plan:     63 y.o. female with the following issues:    1. Menopausal symptom  venlafaxine XR (EFFEXOR XR) 37.5 MG CAPSULE SR 24 HR         Followup: Return in about 6 months (around 3/3/2020), or if symptoms worsen or fail to " improve.

## 2019-10-30 ENCOUNTER — NON-PROVIDER VISIT (OUTPATIENT)
Dept: MEDICAL GROUP | Facility: MEDICAL CENTER | Age: 64
End: 2019-10-30
Payer: COMMERCIAL

## 2019-10-30 DIAGNOSIS — Z23 NEED FOR VACCINATION: ICD-10-CM

## 2019-10-30 PROCEDURE — 90686 IIV4 VACC NO PRSV 0.5 ML IM: CPT | Performed by: FAMILY MEDICINE

## 2019-10-30 PROCEDURE — 90471 IMMUNIZATION ADMIN: CPT | Performed by: FAMILY MEDICINE

## 2019-10-30 NOTE — NON-PROVIDER
"Gerri Castelan is a 63 y.o. female here for a non-provider visit for:   FLU    Reason for immunization: Annual Flu Vaccine  Immunization records indicate need for vaccine: Yes, confirmed with Epic  Minimum interval has been met for this vaccine: Yes  ABN completed: Yes    Order and dose verified by: DOROTHY  VIS Dated  8/15/19 was given to patient: Yes  All IAC Questionnaire questions were answered \"No.\"    Patient tolerated injection and no adverse effects were observed or reported: Yes    Pt scheduled for next dose in series: No    "

## 2019-11-11 DIAGNOSIS — E78.5 DYSLIPIDEMIA: ICD-10-CM

## 2019-11-11 DIAGNOSIS — K21.9 GASTROESOPHAGEAL REFLUX DISEASE WITHOUT ESOPHAGITIS: ICD-10-CM

## 2019-11-11 DIAGNOSIS — R79.89 ELEVATED TSH: ICD-10-CM

## 2019-11-11 RX ORDER — LEVOTHYROXINE SODIUM 0.05 MG/1
50 TABLET ORAL
Qty: 90 TAB | Refills: 3 | Status: SHIPPED | OUTPATIENT
Start: 2019-11-11 | End: 2020-10-02

## 2019-11-11 RX ORDER — OMEPRAZOLE 40 MG/1
40 CAPSULE, DELAYED RELEASE ORAL DAILY
Qty: 90 CAP | Refills: 3 | Status: SHIPPED | OUTPATIENT
Start: 2019-11-11 | End: 2020-10-15

## 2019-11-11 RX ORDER — ATORVASTATIN CALCIUM 20 MG/1
20 TABLET, FILM COATED ORAL
Qty: 90 TAB | Refills: 3 | Status: SHIPPED | OUTPATIENT
Start: 2019-11-11 | End: 2019-11-13 | Stop reason: SDUPTHER

## 2019-11-13 DIAGNOSIS — E78.5 DYSLIPIDEMIA: ICD-10-CM

## 2019-11-13 RX ORDER — ATORVASTATIN CALCIUM 20 MG/1
20 TABLET, FILM COATED ORAL
Qty: 90 TAB | Refills: 3 | Status: SHIPPED | OUTPATIENT
Start: 2019-11-13 | End: 2020-10-02

## 2019-11-25 ENCOUNTER — OFFICE VISIT (OUTPATIENT)
Dept: NEUROLOGY | Facility: MEDICAL CENTER | Age: 64
End: 2019-11-25
Payer: MEDICARE

## 2019-11-25 VITALS
DIASTOLIC BLOOD PRESSURE: 60 MMHG | WEIGHT: 164.2 LBS | SYSTOLIC BLOOD PRESSURE: 110 MMHG | OXYGEN SATURATION: 96 % | HEIGHT: 65 IN | BODY MASS INDEX: 27.36 KG/M2 | HEART RATE: 70 BPM

## 2019-11-25 DIAGNOSIS — Z85.3 PERSONAL HISTORY OF BREAST CANCER: ICD-10-CM

## 2019-11-25 DIAGNOSIS — R40.4 NONSPECIFIC PAROXYSMAL SPELL: ICD-10-CM

## 2019-11-25 DIAGNOSIS — D35.2 PROLACTINOMA (HCC): ICD-10-CM

## 2019-11-25 PROCEDURE — 99205 OFFICE O/P NEW HI 60 MIN: CPT | Performed by: PSYCHIATRY & NEUROLOGY

## 2019-11-25 ASSESSMENT — ENCOUNTER SYMPTOMS
EYE DISCHARGE: 0
HALLUCINATIONS: 0
BRUISES/BLEEDS EASILY: 0
FEVER: 0
ABDOMINAL PAIN: 0
SORE THROAT: 0
SHORTNESS OF BREATH: 0
WEIGHT LOSS: 0
FALLS: 0
DIARRHEA: 1
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
## 2020-01-07 ENCOUNTER — APPOINTMENT (RX ONLY)
Dept: URBAN - METROPOLITAN AREA CLINIC 35 | Facility: CLINIC | Age: 65
Setting detail: DERMATOLOGY
End: 2020-01-07

## 2020-01-07 DIAGNOSIS — L82.1 OTHER SEBORRHEIC KERATOSIS: ICD-10-CM

## 2020-01-07 DIAGNOSIS — Z71.89 OTHER SPECIFIED COUNSELING: ICD-10-CM

## 2020-01-07 DIAGNOSIS — L81.4 OTHER MELANIN HYPERPIGMENTATION: ICD-10-CM

## 2020-01-07 DIAGNOSIS — Z85.820 PERSONAL HISTORY OF MALIGNANT MELANOMA OF SKIN: ICD-10-CM

## 2020-01-07 DIAGNOSIS — D22 MELANOCYTIC NEVI: ICD-10-CM

## 2020-01-07 DIAGNOSIS — Z85.828 PERSONAL HISTORY OF OTHER MALIGNANT NEOPLASM OF SKIN: ICD-10-CM

## 2020-01-07 PROBLEM — D22.5 MELANOCYTIC NEVI OF TRUNK: Status: ACTIVE | Noted: 2020-01-07

## 2020-01-07 PROBLEM — D48.5 NEOPLASM OF UNCERTAIN BEHAVIOR OF SKIN: Status: ACTIVE | Noted: 2020-01-07

## 2020-01-07 PROCEDURE — 99214 OFFICE O/P EST MOD 30 MIN: CPT | Mod: 25

## 2020-01-07 PROCEDURE — 11102 TANGNTL BX SKIN SINGLE LES: CPT

## 2020-01-07 PROCEDURE — ? BIOPSY BY SHAVE METHOD

## 2020-01-07 PROCEDURE — ? COUNSELING

## 2020-01-07 ASSESSMENT — LOCATION SIMPLE DESCRIPTION DERM
LOCATION SIMPLE: CHEST
LOCATION SIMPLE: LEFT UPPER BACK
LOCATION SIMPLE: ABDOMEN
LOCATION SIMPLE: RIGHT SHOULDER
LOCATION SIMPLE: RIGHT UPPER BACK

## 2020-01-07 ASSESSMENT — LOCATION ZONE DERM
LOCATION ZONE: TRUNK
LOCATION ZONE: ARM

## 2020-01-07 ASSESSMENT — LOCATION DETAILED DESCRIPTION DERM
LOCATION DETAILED: LEFT MEDIAL SUPERIOR CHEST
LOCATION DETAILED: LEFT SUPERIOR MEDIAL UPPER BACK
LOCATION DETAILED: RIGHT SUPERIOR UPPER BACK
LOCATION DETAILED: LEFT MEDIAL UPPER BACK
LOCATION DETAILED: MIDDLE STERNUM
LOCATION DETAILED: RIGHT POSTERIOR SHOULDER
LOCATION DETAILED: LEFT SUPERIOR FLANK
LOCATION DETAILED: PERIUMBILICAL SKIN

## 2020-01-07 NOTE — PROCEDURE: MIPS QUALITY
Quality 137: Melanoma: Continuity Of Care - Recall System: Patient information entered into a recall system that includes: target date for the next exam specified AND a process to follow up with patients regarding missed or unscheduled appointments
Quality 226: Preventive Care And Screening: Tobacco Use: Screening And Cessation Intervention: Patient screened for tobacco use and is an ex/non-smoker
Quality 111:Pneumonia Vaccination Status For Older Adults: Pneumococcal Vaccination Previously Received
Quality 130: Documentation Of Current Medications In The Medical Record: Current Medications Documented
Quality 402: Tobacco Use And Help With Quitting Among Adolescents: Patient screened for tobacco and never smoked
Quality 138: Melanoma: Coordination Of Care: A treatment plan was communicated to the physicians providing continuing care within one month of diagnosis outlining: diagnosis, tumor thickness and a plan for surgery or alternate care.
Detail Level: Detailed
Quality 397: Melanoma: Reporting: The pathology report includes a pT Category and statement on thickness and ulceration for pT1, mitotic rate.

## 2020-01-07 NOTE — PROCEDURE: COUNSELING
Detail Level: Simple
Quality 224: Stage 0-Iic Melanoma: Overutilization Of Imaging Studies For Only Stage 0-Iic Melanoma: None of the following diagnostic imaging studies ordered: chest X-ray, CT, Ultrasound, MRI, PET, or nuclear medicine scans (ML)
Quality 137: Melanoma: Continuity Of Care - Recall System: Patient information entered into a recall system that includes: target date for the next exam specified AND a process to follow up with patients regarding missed or unscheduled appointments
Detail Level: Generalized
Detail Level: Detailed
Detail Level: Zone

## 2020-01-07 NOTE — PROCEDURE: BIOPSY BY SHAVE METHOD
Size Of Lesion In Cm: 0
Hide Anticipated Plan (Based On Presumed Biopsy Results)?: No
Consent: Written consent was obtained and risks were reviewed including but not limited to scarring, infection, bleeding, scabbing, incomplete removal, nerve damage and allergy to anesthesia.
Anesthesia Type: 0.5% lidocaine with 1:200,000 epinephrine and a 1:10 solution of 8.4% sodium bicarbonate
Electrodesiccation And Curettage Text: The wound bed was treated with electrodesiccation and curettage after the biopsy was
Notification Instructions: Patient will be notified of biopsy results. However, patient instructed to call the office if not contacted within 2 weeks.
Hemostasis: Aluminum Chloride
Biopsy Type: H and E
Render Path Notes In Note?: Yes
Curettage Text: The wound bed was treated with curettage after the biopsy was performed.
Depth Of Biopsy: dermis
Anesthesia Volume In Cc: 1
Detail Level: Detailed
Lab: [id number]
Wound Care: Petrolatum
Post-Care Instructions: I reviewed with the patient in detail post-care instructions. Patient is to keep the biopsy site dry overnight, and then apply white petrolatum twice daily until healed. Patient may apply hydrogen peroxide soaks to remove any crusting.
Type Of Destruction Used: Electrodesiccation and Curettage
Biopsy Method: Dermablade
Dressing: pressure dressing
Billing Type: Third-Party Bill
Lab Facility:

## 2020-01-08 ENCOUNTER — TELEPHONE (OUTPATIENT)
Dept: ENDOCRINOLOGY | Facility: MEDICAL CENTER | Age: 65
End: 2020-01-08

## 2020-01-08 DIAGNOSIS — E53.8 VITAMIN B 12 DEFICIENCY: ICD-10-CM

## 2020-01-08 DIAGNOSIS — E03.9 IDIOPATHIC HYPOTHYROIDISM: ICD-10-CM

## 2020-01-08 DIAGNOSIS — D35.2 PROLACTINOMA (HCC): ICD-10-CM

## 2020-01-08 DIAGNOSIS — E55.9 VITAMIN D DEFICIENCY: ICD-10-CM

## 2020-01-08 NOTE — TELEPHONE ENCOUNTER
Regarding: Prescription Question  Contact: [phone]  ----- Message from Melva Ruano sent at 1/7/2020  7:57 AM PST -----       ----- Message from Gerri Castelan to Amos Flores M.D. sent at 1/6/2020 11:02 AM -----   Hi.  I've been having difficulty going to sleep and also waking up numerous times during the night.  Could it be the bromocriptine or the vitamin B12 or Vitamin D3?  Should I lower the vitamins dosage and discontinue the bromocriptine?  I'm also having a lot of hot flashes during the day and night sweats.

## 2020-01-08 NOTE — TELEPHONE ENCOUNTER
Called the patient as per her request and reached voice message.  Left the voice message to stop bromocriptine, vitamin D and B12 for now.  Advised to get the labs done as soon as she can.  I have placed the orders in the chart.  Also left a message to call us back if she has any additional questions or concerns.  Also advised to make a follow-up appointment if not already done so.

## 2020-01-09 ENCOUNTER — HOSPITAL ENCOUNTER (OUTPATIENT)
Dept: LAB | Facility: MEDICAL CENTER | Age: 65
End: 2020-01-09
Attending: NEUROLOGICAL SURGERY
Payer: MEDICARE

## 2020-01-09 LAB
BUN SERPL-MCNC: 18 MG/DL (ref 8–22)
CREAT SERPL-MCNC: 0.75 MG/DL (ref 0.5–1.4)

## 2020-01-09 PROCEDURE — 36415 COLL VENOUS BLD VENIPUNCTURE: CPT

## 2020-01-09 PROCEDURE — 84520 ASSAY OF UREA NITROGEN: CPT

## 2020-01-09 PROCEDURE — 84146 ASSAY OF PROLACTIN: CPT

## 2020-01-09 PROCEDURE — 82565 ASSAY OF CREATININE: CPT

## 2020-01-10 LAB — PROLACTIN SERPL-MCNC: 104.29 NG/ML (ref 2.8–26)

## 2020-01-17 ENCOUNTER — OFFICE VISIT (OUTPATIENT)
Dept: MEDICAL GROUP | Facility: MEDICAL CENTER | Age: 65
End: 2020-01-17
Payer: MEDICARE

## 2020-01-17 VITALS
WEIGHT: 167 LBS | HEIGHT: 65 IN | DIASTOLIC BLOOD PRESSURE: 64 MMHG | OXYGEN SATURATION: 95 % | HEART RATE: 82 BPM | RESPIRATION RATE: 12 BRPM | BODY MASS INDEX: 27.82 KG/M2 | TEMPERATURE: 98.2 F | SYSTOLIC BLOOD PRESSURE: 108 MMHG

## 2020-01-17 DIAGNOSIS — Z78.0 POSTMENOPAUSAL STATUS: ICD-10-CM

## 2020-01-17 DIAGNOSIS — Z23 NEED FOR PNEUMOCOCCAL VACCINATION: ICD-10-CM

## 2020-01-17 PROCEDURE — 90670 PCV13 VACCINE IM: CPT | Performed by: FAMILY MEDICINE

## 2020-01-17 PROCEDURE — G0009 ADMIN PNEUMOCOCCAL VACCINE: HCPCS | Performed by: FAMILY MEDICINE

## 2020-01-17 PROCEDURE — 99213 OFFICE O/P EST LOW 20 MIN: CPT | Mod: 25 | Performed by: FAMILY MEDICINE

## 2020-01-17 ASSESSMENT — PATIENT HEALTH QUESTIONNAIRE - PHQ9: CLINICAL INTERPRETATION OF PHQ2 SCORE: 0

## 2020-01-27 ENCOUNTER — HOSPITAL ENCOUNTER (OUTPATIENT)
Dept: RADIOLOGY | Facility: MEDICAL CENTER | Age: 65
End: 2020-01-27
Attending: FAMILY MEDICINE
Payer: MEDICARE

## 2020-01-27 ENCOUNTER — APPOINTMENT (RX ONLY)
Dept: URBAN - METROPOLITAN AREA CLINIC 35 | Facility: CLINIC | Age: 65
Setting detail: DERMATOLOGY
End: 2020-01-27

## 2020-01-27 DIAGNOSIS — Z78.0 POSTMENOPAUSAL STATUS: ICD-10-CM

## 2020-01-27 PROBLEM — C44.612 BASAL CELL CARCINOMA OF SKIN OF RIGHT UPPER LIMB, INCLUDING SHOULDER: Status: ACTIVE | Noted: 2020-01-27

## 2020-01-27 PROCEDURE — 77080 DXA BONE DENSITY AXIAL: CPT

## 2020-01-27 PROCEDURE — 17262 DSTRJ MAL LES T/A/L 1.1-2.0: CPT

## 2020-01-27 PROCEDURE — ? CURETTAGE AND DESTRUCTION

## 2020-01-27 NOTE — PROCEDURE: CURETTAGE AND DESTRUCTION
Add Intralesional Injection: No
Number Of Curettages: 3
Detail Level: Detailed
Cautery Type: electrodesiccation
Post-Care Instructions: I reviewed with the patient in detail post-care instructions. Patient is to keep the area dry for 24 hours, and not to engage in any swimming until the area is healed. Should the patient develop any fevers, chills, bleeding, severe pain patient will contact the office immediately.
Total Volume (Ccs): 1
Render Post-Care Instructions In Note?: yes
Accession # (Optional): [id number]
Size Of Lesion After Curettage: 1.2
Consent was obtained from the patient. The risks, benefits and alternatives to therapy were discussed in detail. Specifically, the risks of infection, scarring, bleeding, prolonged wound healing, nerve injury, incomplete removal, allergy to anesthesia and recurrence were addressed. Alternatives to ED&C, such as: surgical removal and XRT were also discussed.  Prior to the procedure, the treatment site was clearly identified and confirmed by the patient. All components of Universal Protocol/PAUSE Rule completed.
Additional Information: (Optional): The wound was cleaned, and a pressure dressing was applied.  The patient received detailed post-op instructions.
What Was Performed First?: Curettage
Anesthesia Volume In Cc: 2
Anesthesia Type: 0.5% lidocaine with 1:200,000 epinephrine and a 1:10 solution of 8.4% sodium bicarbonate
Size Of Lesion In Cm: 0.9
Bill As A Line Item Or As Units: Line Item

## 2020-02-20 ENCOUNTER — TELEPHONE (OUTPATIENT)
Dept: ENDOCRINOLOGY | Facility: MEDICAL CENTER | Age: 65
End: 2020-02-20

## 2020-02-21 NOTE — TELEPHONE ENCOUNTER
"Patient called in and wants to know if there is a \"scan\" that needs to be completed before her appointment on 3/3. Patient did not know the name of the scan.   "

## 2020-02-21 NOTE — TELEPHONE ENCOUNTER
Phone Number Called: [phone]    Call outcome: Left detailed message for patient. Informed to call back with any additional questions.    Message: Contacted patient to let her know per Dr. Flores she does not need a scan for her visit on 03/03/20.

## 2020-02-27 ENCOUNTER — HOSPITAL ENCOUNTER (OUTPATIENT)
Dept: LAB | Facility: MEDICAL CENTER | Age: 65
End: 2020-02-27
Attending: FAMILY MEDICINE
Payer: MEDICARE

## 2020-02-27 DIAGNOSIS — E55.9 VITAMIN D DEFICIENCY: ICD-10-CM

## 2020-02-27 DIAGNOSIS — E03.9 IDIOPATHIC HYPOTHYROIDISM: ICD-10-CM

## 2020-02-27 DIAGNOSIS — E53.8 VITAMIN B 12 DEFICIENCY: ICD-10-CM

## 2020-02-27 DIAGNOSIS — D35.2 PROLACTINOMA (HCC): ICD-10-CM

## 2020-02-27 LAB
ANION GAP SERPL CALC-SCNC: 8 MMOL/L (ref 0–11.9)
BUN SERPL-MCNC: 22 MG/DL (ref 8–22)
CALCIUM SERPL-MCNC: 9.9 MG/DL (ref 8.5–10.5)
CHLORIDE SERPL-SCNC: 107 MMOL/L (ref 96–112)
CO2 SERPL-SCNC: 27 MMOL/L (ref 20–33)
CREAT SERPL-MCNC: 0.92 MG/DL (ref 0.5–1.4)
ERYTHROCYTE [DISTWIDTH] IN BLOOD BY AUTOMATED COUNT: 44.6 FL (ref 35.9–50)
GLUCOSE SERPL-MCNC: 94 MG/DL (ref 65–99)
HCT VFR BLD AUTO: 41.6 % (ref 37–47)
HGB BLD-MCNC: 13.8 G/DL (ref 12–16)
MCH RBC QN AUTO: 31.6 PG (ref 27–33)
MCHC RBC AUTO-ENTMCNC: 33.2 G/DL (ref 33.6–35)
MCV RBC AUTO: 95.2 FL (ref 81.4–97.8)
PLATELET # BLD AUTO: 149 K/UL (ref 164–446)
PMV BLD AUTO: 12.1 FL (ref 9–12.9)
POTASSIUM SERPL-SCNC: 4.6 MMOL/L (ref 3.6–5.5)
RBC # BLD AUTO: 4.37 M/UL (ref 4.2–5.4)
SODIUM SERPL-SCNC: 142 MMOL/L (ref 135–145)
WBC # BLD AUTO: 4.5 K/UL (ref 4.8–10.8)

## 2020-02-27 PROCEDURE — 36415 COLL VENOUS BLD VENIPUNCTURE: CPT

## 2020-02-27 PROCEDURE — 84443 ASSAY THYROID STIM HORMONE: CPT

## 2020-02-27 PROCEDURE — 82607 VITAMIN B-12: CPT

## 2020-02-27 PROCEDURE — 82306 VITAMIN D 25 HYDROXY: CPT

## 2020-02-27 PROCEDURE — 80048 BASIC METABOLIC PNL TOTAL CA: CPT

## 2020-02-27 PROCEDURE — 85027 COMPLETE CBC AUTOMATED: CPT

## 2020-02-27 PROCEDURE — 84481 FREE ASSAY (FT-3): CPT

## 2020-02-27 PROCEDURE — 84480 ASSAY TRIIODOTHYRONINE (T3): CPT

## 2020-02-27 PROCEDURE — 84439 ASSAY OF FREE THYROXINE: CPT

## 2020-02-27 PROCEDURE — 84146 ASSAY OF PROLACTIN: CPT

## 2020-02-28 LAB
25(OH)D3 SERPL-MCNC: 80 NG/ML (ref 30–100)
PROLACTIN SERPL-MCNC: 15.87 NG/ML (ref 2.8–26)
T3 SERPL-MCNC: 99.7 NG/DL (ref 60–181)
T3FREE SERPL-MCNC: 2.85 PG/ML (ref 2.4–4.2)
T4 FREE SERPL-MCNC: 1 NG/DL (ref 0.53–1.43)
TSH SERPL DL<=0.005 MIU/L-ACNC: 1.01 UIU/ML (ref 0.38–5.33)
VIT B12 SERPL-MCNC: 1328 PG/ML (ref 211–911)

## 2020-03-01 NOTE — PROGRESS NOTES
Endocrinology Clinic Progress Note  PCP: Davidson Murillo M.D.    HPI:  Gerri Castelan is a 64 y.o. old patient who comes in today for review of endocrine problems.    Hypothyroidism:  Currently taking Levothyroxine 50 mcg daily.  Results for GERRI CASTELAN (MRN [id number]) as of 3/1/2020 14:05   Ref. Range 2/27/2020 10:07   TSH Latest Ref Range: 0.380 - 5.330 uIU/mL 1.010   Free T-4 Latest Ref Range: 0.53 - 1.43 ng/dL 1.00   T3 Latest Ref Range: 60.0 - 181.0 ng/dL 99.7   T3,Free Latest Ref Range: 2.40 - 4.20 pg/mL 2.85        Hyperprolactin: She tried to go off the Bromocriptine but her prolactin level went up. Currently back taking Bromocriptine 2.5 mg BID. Pituitary M.R.I. done on 2/13/19 shows Pituitary gland is mildly prominent although significantly reduced in size from prior exam.  Superior margin is no longer convex.  The pituitary tumor that was invading into the cavernous sinus has disappeared.  Results for GERRI CASTELAN (MRN [id number]) as of 3/1/2020 14:05   Ref. Range 1/9/2020 12:04 2/27/2020 10:07   Prolactin Latest Ref Range: 2.80 - 26.00 ng/mL 104.29 (H) 15.87        Vitamin D Deficiency:  Currently taking Vitamin D 10,000 units daily.  Results for GERRI CASTELAN (MRN [id number]) as of 3/1/2020 14:05   Ref. Range 2/27/2020 10:07   25-Hydroxy   Vitamin D 25 Latest Ref Range: 30 - 100 ng/mL 80        Vitamin B 12 Deficiency:  Currently taking Vitamin B 12 2500 mcg daily.  Results for GERRI CASTELAN (MRN [id number]) as of 3/1/2020 14:05   Ref. Range 2/27/2020 10:07   Vitamin B12 -True Cobalamin Latest Ref Range: 211 - 911 pg/mL 1328 (H)        ROS:  Constitutional: She has lost 10 pounds [employer]  Endo: Denies excessive thirst or frequent urination  All other systems were reviewed and were negative.    Past Medical History:  Patient Active Problem List    Diagnosis Date Noted   • Acute infective otitis externa, right 05/07/2019   • Prolactinoma (HCC)    • Pituitary  "adenoma with extrasellar extension (HCC) 09/24/2018   • Pituitary macroadenoma (HCC) 09/14/2018   • Elevated fasting glucose 12/30/2016   • Abnormal echocardiogram 11/17/2016   • Family history of early CAD 10/17/2016   • Dyslipidemia 09/26/2016   • Idiopathic hypothyroidism 09/26/2016   • Chronic right shoulder pain 09/22/2016   • Frequent loose stools 09/22/2016   • Chronic migraine without aura without status migrainosus, not intractable    • Melanoma (HCC) 02/02/2011   • Personal history of breast cancer    • Family history of breast cancer in mother 12/01/2010   • GERD (gastroesophageal reflux disease) 05/19/2009       Medications:    Current Outpatient Medications:   •  VITAMIN D PO, Take 5,000 Units by mouth every day., Disp: , Rfl:   •  Cyanocobalamin (B-12) 1000 MCG Tab, , Disp: , Rfl:   •  diazePAM (VALIUM) 5 MG Tab, Take 1 Tab by mouth See Admin Instructions for 7 days. Take 1 tablet 40 minutes prior to MRI.  May repeat with second tablet during the MRI if necessary.  Do not drive on this medication., Disp: 2 Tab, Rfl: 0  •  CHOLESTYRAMINE PO, Take  by mouth., Disp: , Rfl:   •  atorvastatin (LIPITOR) 20 MG Tab, Take 1 Tab by mouth every bedtime., Disp: 90 Tab, Rfl: 3  •  omeprazole (PRILOSEC) 40 MG delayed-release capsule, Take 1 Cap by mouth every day., Disp: 90 Cap, Rfl: 3  •  levothyroxine (SYNTHROID) 50 MCG Tab, Take 1 Tab by mouth every morning before breakfast., Disp: 90 Tab, Rfl: 3  •  Wheat Dextrin (BENEFIBER PO), Take  by mouth., Disp: , Rfl:   •  bromocriptine (PARLODEL) 2.5 MG Tab, TK 1 T PO BID, Disp: , Rfl: 0  •  Probiotic Product (PROBIOTIC DAILY PO), Take 1 Cap by mouth every day., Disp: , Rfl:     Labs: Reviewed    Physical Examination:  Vital signs: /70   Pulse 68   Ht 1.651 m (5' 5\")   Wt 71.2 kg (157 lb)   LMP 10/30/2004   SpO2 99%   BMI 26.13 kg/m²  Body mass index is 26.13 kg/m². Patient's body mass index is 26.13 kg/m².   General: No apparent distress, cooperative  Eyes: " No scleral icterus or discharge, no visual field defects by confrontation method, no blurry vision or nystagmus on finger tracking  ENMT: Normal on external inspection of nose, lips, normal thyroid exam  Neck: No abnormal masses on inspection  Resp: Normal effort, clear to auscultation bilaterally   CVS: Regular rate and rhythm, S1 S2 normal, no murmur   Extremities: No edema  Abdomen: mild abdominal obesity present  Neuro: Alert and oriented  Skin: No rash  Psych: Normal mood and affect, intact memory and able to make informed decisions      Assessment and Plan:    1. Prolactinoma   Stable on bromocriptine therapy.  Continue bromocriptine therapy for now.  Advised the plan to be on bromocriptine at least for good 3 years before making a decision to stop it and see if she would not have a relapse of her prolactinoma.    She is due to get her the MRI of her pituitary done very shortly.  She also follows up with a neurosurgeon.    2. Hypothyroidism  Continue current dose of levothyroxine    3. Vitamin D deficiency  Continue vitamin D supplementation with food as per HPI    4. Vitamin B 12 deficiency  Recommend over the counter sublingual B 12 - 1506-9649 mcg daily.   Side effects and benefits discussed with patient in detail.     Return in about 6 months (around 9/3/2020).    Thank you for allowing me to participate in the care of this patient.      CC:   Davidson Murillo M.D.    This note was created using voice recognition software (Dragon). The accuracy of the dictation is limited by the abilities of the software. I have reviewed the note prior to signing, however some errors in grammar and context are still possible. If you have any questions related to this note please do not hesitate to contact our office.

## 2020-03-02 ENCOUNTER — TELEPHONE (OUTPATIENT)
Dept: MEDICAL GROUP | Facility: MEDICAL CENTER | Age: 65
End: 2020-03-02

## 2020-03-02 DIAGNOSIS — F40.240 CLAUSTROPHOBIA: ICD-10-CM

## 2020-03-02 RX ORDER — DIAZEPAM 5 MG/1
5 TABLET ORAL SEE ADMIN INSTRUCTIONS
Qty: 2 TAB | Refills: 0 | Status: SHIPPED
Start: 2020-03-02 | End: 2020-03-09

## 2020-03-02 RX ORDER — CYANOCOBALAMIN (VITAMIN B-12) 1000 MCG
TABLET ORAL
COMMUNITY
Start: 2020-01-02 | End: 2020-10-19

## 2020-03-03 ENCOUNTER — OFFICE VISIT (OUTPATIENT)
Dept: ENDOCRINOLOGY | Facility: MEDICAL CENTER | Age: 65
End: 2020-03-03
Payer: MEDICARE

## 2020-03-03 VITALS
WEIGHT: 157 LBS | HEIGHT: 65 IN | SYSTOLIC BLOOD PRESSURE: 122 MMHG | DIASTOLIC BLOOD PRESSURE: 70 MMHG | BODY MASS INDEX: 26.16 KG/M2 | OXYGEN SATURATION: 99 % | HEART RATE: 68 BPM

## 2020-03-03 DIAGNOSIS — E55.9 VITAMIN D DEFICIENCY: ICD-10-CM

## 2020-03-03 DIAGNOSIS — E53.8 VITAMIN B 12 DEFICIENCY: ICD-10-CM

## 2020-03-03 DIAGNOSIS — E27.40 ADRENAL INSUFFICIENCY (HCC): ICD-10-CM

## 2020-03-03 DIAGNOSIS — D35.2 PITUITARY MACROADENOMA (HCC): ICD-10-CM

## 2020-03-03 DIAGNOSIS — E03.9 HYPOTHYROIDISM, UNSPECIFIED TYPE: ICD-10-CM

## 2020-03-03 DIAGNOSIS — D35.2 PROLACTINOMA (HCC): ICD-10-CM

## 2020-03-03 PROCEDURE — 99214 OFFICE O/P EST MOD 30 MIN: CPT | Performed by: INTERNAL MEDICINE

## 2020-03-03 ASSESSMENT — FIBROSIS 4 INDEX: FIB4 SCORE: 1.65

## 2020-03-03 NOTE — TELEPHONE ENCOUNTER
Patient is having an MRI on Thursday and would like to have something sent to pharmacy to relax her because she is claustrophobic

## 2020-03-05 ENCOUNTER — HOSPITAL ENCOUNTER (OUTPATIENT)
Dept: RADIOLOGY | Facility: MEDICAL CENTER | Age: 65
End: 2020-03-05
Attending: NEUROLOGICAL SURGERY
Payer: MEDICARE

## 2020-03-05 DIAGNOSIS — D35.3 BENIGN NEOPLASM OF PITUITARY GLAND AND CRANIOPHARYNGEAL DUCT (POUCH) (HCC): ICD-10-CM

## 2020-03-05 DIAGNOSIS — D35.2 BENIGN NEOPLASM OF PITUITARY GLAND AND CRANIOPHARYNGEAL DUCT (POUCH) (HCC): ICD-10-CM

## 2020-03-05 PROCEDURE — 70553 MRI BRAIN STEM W/O & W/DYE: CPT

## 2020-03-05 PROCEDURE — A9576 INJ PROHANCE MULTIPACK: HCPCS | Performed by: NEUROLOGICAL SURGERY

## 2020-03-05 PROCEDURE — 700117 HCHG RX CONTRAST REV CODE 255: Performed by: NEUROLOGICAL SURGERY

## 2020-03-05 RX ADMIN — GADOTERIDOL 16 ML: 279.3 INJECTION, SOLUTION INTRAVENOUS at 10:28

## 2020-03-09 ENCOUNTER — OFFICE VISIT (OUTPATIENT)
Dept: MEDICAL GROUP | Facility: MEDICAL CENTER | Age: 65
End: 2020-03-09
Payer: MEDICARE

## 2020-03-09 VITALS
OXYGEN SATURATION: 97 % | WEIGHT: 159 LBS | BODY MASS INDEX: 26.49 KG/M2 | HEIGHT: 65 IN | DIASTOLIC BLOOD PRESSURE: 58 MMHG | SYSTOLIC BLOOD PRESSURE: 100 MMHG | TEMPERATURE: 98 F | HEART RATE: 63 BPM | RESPIRATION RATE: 14 BRPM

## 2020-03-09 DIAGNOSIS — Z85.3 PERSONAL HISTORY OF BREAST CANCER: ICD-10-CM

## 2020-03-09 DIAGNOSIS — K64.1 GRADE II HEMORRHOIDS: ICD-10-CM

## 2020-03-09 DIAGNOSIS — K59.1 FUNCTIONAL DIARRHEA: ICD-10-CM

## 2020-03-09 DIAGNOSIS — Z85.820 HISTORY OF MALIGNANT MELANOMA: ICD-10-CM

## 2020-03-09 DIAGNOSIS — G43.709 CHRONIC MIGRAINE WITHOUT AURA WITHOUT STATUS MIGRAINOSUS, NOT INTRACTABLE: ICD-10-CM

## 2020-03-09 DIAGNOSIS — Z85.3 HISTORY OF LEFT BREAST CANCER: ICD-10-CM

## 2020-03-09 DIAGNOSIS — E03.9 IDIOPATHIC HYPOTHYROIDISM: ICD-10-CM

## 2020-03-09 DIAGNOSIS — G89.29 CHRONIC RIGHT SHOULDER PAIN: ICD-10-CM

## 2020-03-09 DIAGNOSIS — Z85.828 HISTORY OF BASAL CELL CANCER: ICD-10-CM

## 2020-03-09 DIAGNOSIS — Z00.00 MEDICARE ANNUAL WELLNESS VISIT, SUBSEQUENT: ICD-10-CM

## 2020-03-09 DIAGNOSIS — D35.2 PROLACTINOMA (HCC): ICD-10-CM

## 2020-03-09 DIAGNOSIS — Z90.13 HISTORY OF BILATERAL MASTECTOMY: ICD-10-CM

## 2020-03-09 DIAGNOSIS — R73.01 ELEVATED FASTING GLUCOSE: ICD-10-CM

## 2020-03-09 DIAGNOSIS — E78.5 DYSLIPIDEMIA: ICD-10-CM

## 2020-03-09 DIAGNOSIS — M25.511 CHRONIC RIGHT SHOULDER PAIN: ICD-10-CM

## 2020-03-09 DIAGNOSIS — K21.9 GASTROESOPHAGEAL REFLUX DISEASE WITHOUT ESOPHAGITIS: ICD-10-CM

## 2020-03-09 DIAGNOSIS — D35.2 PITUITARY ADENOMA WITH EXTRASELLAR EXTENSION (HCC): ICD-10-CM

## 2020-03-09 DIAGNOSIS — I51.89 GRADE II DIASTOLIC DYSFUNCTION: ICD-10-CM

## 2020-03-09 DIAGNOSIS — R93.1 ABNORMAL ECHOCARDIOGRAM: ICD-10-CM

## 2020-03-09 DIAGNOSIS — N95.1 MENOPAUSAL SYMPTOM: ICD-10-CM

## 2020-03-09 PROCEDURE — G0402 INITIAL PREVENTIVE EXAM: HCPCS | Performed by: FAMILY MEDICINE

## 2020-03-09 ASSESSMENT — PATIENT HEALTH QUESTIONNAIRE - PHQ9: CLINICAL INTERPRETATION OF PHQ2 SCORE: 0

## 2020-03-09 ASSESSMENT — ACTIVITIES OF DAILY LIVING (ADL): BATHING_REQUIRES_ASSISTANCE: 0

## 2020-03-09 ASSESSMENT — FIBROSIS 4 INDEX: FIB4 SCORE: 1.65

## 2020-03-09 ASSESSMENT — ENCOUNTER SYMPTOMS: GENERAL WELL-BEING: GOOD

## 2020-03-09 NOTE — PROGRESS NOTES
Chief Complaint   Patient presents with   • Annual Exam         HPI:  Gerri Castelan is a 64 y.o. here for Medicare Annual Wellness Visit     Patient Active Problem List    Diagnosis Date Noted   • History of malignant melanoma 03/09/2020   • History of left breast cancer 03/09/2020   • History of bilateral mastectomy 03/09/2020   • Grade II hemorrhoids 03/09/2020   • Menopausal symptom 03/09/2020   • Acute infective otitis externa, right 05/07/2019   • Prolactinoma (HCC)    • Incidentally-noted pituitary tumor (HCC) 10/07/2018   • Pituitary adenoma with extrasellar extension (HCC) 09/24/2018   • Pituitary macroadenoma (HCC) 09/14/2018   • Elevated fasting glucose 12/30/2016   • Abnormal echocardiogram 11/17/2016   • Family history of early CAD 10/17/2016   • Dyslipidemia 09/26/2016   • Idiopathic hypothyroidism 09/26/2016   • Chronic right shoulder pain 09/22/2016   • Frequent loose stools 09/22/2016   • Chronic migraine without aura without status migrainosus, not intractable    • Personal history of breast cancer    • Family history of breast cancer in mother 12/01/2010   • GERD (gastroesophageal reflux disease) 05/19/2009       Current Outpatient Medications   Medication Sig Dispense Refill   • VITAMIN D PO Take 5,000 Units by mouth every day.     • Cyanocobalamin (B-12) 1000 MCG Tab      • CHOLESTYRAMINE PO Take  by mouth.     • atorvastatin (LIPITOR) 20 MG Tab Take 1 Tab by mouth every bedtime. 90 Tab 3   • omeprazole (PRILOSEC) 40 MG delayed-release capsule Take 1 Cap by mouth every day. 90 Cap 3   • levothyroxine (SYNTHROID) 50 MCG Tab Take 1 Tab by mouth every morning before breakfast. 90 Tab 3   • Wheat Dextrin (BENEFIBER PO) Take  by mouth.     • bromocriptine (PARLODEL) 2.5 MG Tab TK 1 T PO BID  0   • Probiotic Product (PROBIOTIC DAILY PO) Take 1 Cap by mouth every day.       No current facility-administered medications for this visit.             Current supplements as per medication list.        Allergies: Amoxicillin and Arimidex [anastrozole]    Current social contact/activities: she is active with friends, family and spouse.     She  reports that she has never smoked. She has never used smokeless tobacco. She reports current alcohol use of about 0.6 oz of alcohol per week. She reports that she does not use drugs.  Counseling given: Yes      DPA/Advanced Directive:  Patient has Advanced Directive, but it is not on file. Instructed to bring in a copy to scan into their chart.    ROS:    Gait: Uses no assistive device  Ostomy: No  Other tubes: No  Amputations: No  Chronic oxygen use: No  Last eye exam: October 2019  Wears hearing aids: No   : Denies any urinary leakage during the last 6 months  Denies chest pain, pressure, palpitations or exertional SOB  Denies abdominal pain.  Denies change in bowel habits but still occasional urgent loose stool.  Denies visible blood.  hemorrhoid is slightly bothersome.  Continues to see dermatology every six months.    Screening:  Discussed, is up to date    Depression Screening    Little interest or pleasure in doing things?  0 - not at all  Feeling down, depressed , or hopeless? 0 - not at all  Patient Health Questionnaire Score: 0     If depressive symptoms identified deferred to follow up visit unless specifically addressed in assessment and plan.    Interpretation of PHQ-9 Total Score   Score Severity   1-4 No Depression   5-9 Mild Depression   10-14 Moderate Depression   15-19 Moderately Severe Depression   20-27 Severe Depression    Screening for Cognitive Impairment    Three Minute Recall (village, kitchen, baby) 3/3    Khoa clock face with all 12 numbers and set the hands to show 10 past 10.  Yes 5/5    Cognitive concerns identified deferred for follow up unless specifically addressed in assessment and plan.    Fall Risk Assessment    Has the patient had two or more falls in the last year or any fall with injury in the last year?  No    Safety  Assessment    Throw rugs on floor.  Yes  Handrails on all stairs.  Yes  Good lighting in all hallways.  Yes  Difficulty hearing.  No  Patient counseled about all safety risks that were identified.    Functional Assessment ADLs    Are there any barriers preventing you from cooking for yourself or meeting nutritional needs?  No.    Are there any barriers preventing you from driving safely or obtaining transportation?  No.    Are there any barriers preventing you from using a telephone or calling for help?  No.    Are there any barriers preventing you from shopping?  No.    Are there any barriers preventing you from taking care of your own finances?  No.    Are there any barriers preventing you from managing your medications?  No.    Are there any barriers preventing you from showering, bathing or dressing yourself?  No.    Are you currently engaging in any exercise or physical activity?  Yes.     What is your perception of your health?  Good.      Health Maintenance Summary                Annual Wellness Visit Overdue 1955     IMM ZOSTER VACCINES Postponed 8/9/2020 Originally 12/10/2015. System: vaccine not available, other system reasons     Done 10/15/2015 Imm Admin: Zoster Vaccine Live (ZVL) (Zostavax)    IMM PNEUMOCOCCAL VACCINE: 65+ Years Next Due 1/17/2021      Done 1/17/2020 Imm Admin: Pneumococcal Conjugate Vaccine (Prevnar/PCV-13)    IMM DTaP/Tdap/Td Vaccine Next Due 8/2/2021      Done 8/2/2011 Imm Admin: Tdap Vaccine    PAP SMEAR Next Due 4/13/2022      Done 4/13/2017 THINPREP PAP WITH HPV     Patient has more history with this topic...    BONE DENSITY Next Due 1/27/2025      Done 1/27/2020 DS-BONE DENSITY STUDY (DEXA)     Patient has more history with this topic...    COLONOSCOPY Next Due 2/24/2025      Done 2/24/2015 AMB REFERRAL TO GI FOR COLONOSCOPY          Patient Care Team:  Davidson Murillo M.D. as PCP - General  Reilly Watkins M.D. as Consulting Physician (Gastroenterology)  LUIS A Arriaza  YONNY Zimmerman (Inactive) as Consulting Physician (Oncology)  Amos Flores M.D. (Endocrinology)  Mackenzie Loyd M.D. as Consulting Physician (Dermatology)        Social History     Tobacco Use   • Smoking status: Never Smoker   • Smokeless tobacco: Never Used   Substance Use Topics   • Alcohol use: Yes     Alcohol/week: 0.6 oz     Types: 1 Standard drinks or equivalent per week     Comment: rarely   • Drug use: No     Family History   Problem Relation Age of Onset   • Cancer Mother 60        breast   • Hyperlipidemia Mother    • Dementia Mother    • Heart Attack Brother         MI at age 50   • Heart Attack Sister         MI at age 52   • Heart Attack Maternal Grandfather         MI ta ge 65   • Heart Disease Sister 52        Down syndrome with heart murmur   • Diabetes Brother 54     She  has a past medical history of Cancer (HCC), Carcinoma of breast (HCC) (1/2011), Disorder of thyroid, Dyslipidemia (10/2010), ESOPHAGITIS, Family history of breast cancer in first degree relative, Family history of early CAD (10/17/2016), gallbladder polyp (1/2011), GERD (gastroesophageal reflux disease), Gynecological disorder, Malignant melanoma (HCC) (12/1983), Migraine, Prolactinoma (HCC), S/P bilateral mastectomy (3/2011), and TIA (transient ischemic attack) (2016).   Past Surgical History:   Procedure Laterality Date   • ANAIS BY LAPAROSCOPY  12/6/2012    Performed by Yessica Ocampo M.D. at [facility]   • NIPPLE RECONSTRUCTION  12/1/2011    Performed by PATEL DALTON at [facility]   • FLAP GRAFT  12/1/2011    Performed by PATEL DALTON at [facility]   • BREAST RECONSTRUCTION  7/15/2011    Performed by PATEL DALTON at [facility]   • TISSUE EXPANDER PLACE/REMOVE  7/15/2011    Performed by PATEL DALTON at [facility]   • BREAST IMPLANT REVISION  7/15/2011    Performed by PATEL DALTON at [facility]   • CAPSULECTOMY   "7/15/2011    Performed by PATEL DALTON at SURGERY [facility]   • TISSUE TRANSFER/REARRANGE  7/15/2011    Performed by PATEL DALTON at SURGERY [facility]   • MASTECTOMY  3/29/2011    Performed by HANNAH LEVIN at SURGERY [facility]   • BREAST RECONSTRUCTION  3/29/2011    Performed by PATEL DALTON at SURGERY [facility]   • TISSUE EXPANDER PLACE/REMOVE  3/29/2011    Performed by PATEL DALTON at SURGERY [facility]   • BREAST BIOPSY  2/24/2011    Performed by HANNAH LEVIN at SURGERY [facility] DAY [facility]   • LUMPECTOMY  1/19/2011    Performed by HANNAH LEVIN at SURGERY SAME DAY [facility]   • NODE BIOPSY SENTINEL  1/19/2011    Performed by HANNAH LEVIN at SURGERY SAME DAY [facility]   • COLONOSCOPY  2/2005    normal, due 2015   • OTHER ORTHOPEDIC SURGERY  1997    knee surgery ACL repair   • TUBAL LIGATION  1987   • OTHER  1983    malignant melanoma skin cancer upper back       Exam:   /58   Pulse 63   Temp 36.7 °C (98 °F)   Resp 14   Ht 1.651 m (5' 5\")   Wt 72.1 kg (159 lb)   SpO2 97%  Body mass index is 26.46 kg/m².    Hearing good.    Dentition good  Alert, oriented in no acute distress.  Eye contact is good, speech goal directed, affect calm  Movements are strong and symmetric.  Speech is normal and fluent.  Speech is appropriate.  No distress noted.  Neck is supple with good range of motion.  No JVD or carotid bruits appreciated.  Lungs clear to auscultation with good air movement.  Heart regular rate and rhythm normal S1-S2 without murmur.  Abdomen soft, benign, liver edge at appropriate region.  No HSM or mass appreciated.    Recent lab results and MRI testing reviewed and discussed    Assessment and Plan. The following treatment and monitoring plan is recommended:      1. Medicare annual wellness visit, subsequent  Patient's medical problems are generally stable    2. Dyslipidemia  Patient denies chest pain, chest pressure, palpitations or " exertional shortness of breath. Patient denies muscle aches or muscle weakness from the atorvastatin medication. Patient is a never smoker. Patient takes no aspirin daily. Patient has no history of myocardial infarction, stroke or PVD.  The problem is clinically stable.    3. Grade II diastolic dysfunction/Abnormal echocardiogram  Patient has mild grade 2 diastolic dysfunction.  It is unclear where this came from as patient has no history of hypertension.  Patient denies chest pain or chest pressure or exertional shortness of breath.  No significant valvular issues have been identified.  Stable.    4. Prolactinoma (HCC)/Pituitary adenoma with extrasellar extension (HCC)  Patient was identified incidentally with a prolactinoma when she had an episode of altered consciousness.  This was not felt to be due to the tumor.  No further episodes of altered consciousness have occurred.  The tumor was identified as a prolactinoma and has been successfully treated with bromocriptine.  She had an MRI recently that showed decrease in the tumor.  She denies any visual changes related to the tumor.  She continues to follow with neurosurgery.  Stable problem.    5. Menopausal symptom  Patient does get occasional menopausal symptoms.  These are now much better with the addition of oil of evening primrose to her regimen.  This supplement is not felt to increase breast cancer risk per discussions with oncology.  Stable.    6. Gastroesophageal reflux disease without esophagitis  The patient feels the current medication regimen of omeprazole is controlling the gastroesophageal reflux symptoms well. Denies dysphagia, reflux symptoms, acidity, abdominal pain or visible blood or mucus in the stool. Denies vomiting or hematemesis. Denies burping or abdominal bloating. Patient avoids nonsteroidal anti-inflammatory drugs. Avoids heavy meals or eating within 2 hours of bedtime.  Stable problem.    7. Grade II hemorrhoids  Patient continues to  have issues with hemorrhoids off and on.  She no longer has an active anal fissure.  Denies any significant pain or bleeding.  She will be due for another follow-up colonoscopy in 2025.  Stable problem.    8. Functional diarrhea  This has been a problem for several years.  It is intermittent.  It responds to the use of over-the-counter fiber supplements.  She finds when she takes her probiotics this seems to be a little improved.  Denies any visible blood.  Denies stool incontinence or dehydration from these problems.  She states it is quite intermittent.  Stable.    9. Elevated fasting glucose  Patient continues to have mild elevated fasting glucose and mild elevated A1c.  She and her  are trying to be more physically active.  She is watching her diet.  Her weight remains slightly over ideal.  This is discussed.  Stable problem.    10. History of malignant melanoma/History of basal cell cancer  Patient has a significant skin cancer history and continues to follow carefully with dermatology.  Denies any new lesions.  Stable.    11. History of left breast cancer/Personal history of breast cancer/History of bilateral mastectomy  Patient does have history of breast cancer.  Due to family history and the characteristics of her cancer bilateral mastectomies were performed.  Her nodes were negative.  Patient notes no changes or new lumps or masses.  Stable.    12. Idiopathic hypothyroidism  Patient reports good energy level on the medication. Patient denies insomnia, tremor or change in appetite.  Patient is taking the medication on an empty stomach in the morning and waiting at least 30 minutes before eating.  Last TSH in 2 weeks ago was at target.  The problem is stable.    13. Chronic migraine without aura without status migrainosus, not intractable  Patient does have longstanding migraine.  This is intermittent.  Patient uses over-the-counter migraine treatment as needed.  Stable.    14. Chronic right shoulder  pain  Patient does continue to have chronic right shoulder pain.  She does physical therapy for this which helps quite a bit.  Denies any heat or change in the pain.  Stable.      Services suggested: No services needed at this time  Health Care Screening: Age-appropriate preventive services recommended by USPTF and ACIP covered by Medicare were discussed today. Services ordered if indicated and agreed upon by the patient.  Referrals offered: Community-based lifestyle interventions to reduce health risks and promote self-management and wellness, fall prevention, nutrition, physical activity, tobacco-use cessation, weight loss, and mental health services as per orders if indicated.    Discussion today about general wellness and lifestyle habits:  discussed  · Prevent falls and reduce trip hazards; Cautioned about securing or removing rugs.  · Have a working fire alarm and carbon monoxide detector;  has  · Engage in regular physical activity and social activities     Follow-up: Return in about 6 months (around 9/9/2020), or if symptoms worsen or fail to improve.

## 2020-03-31 DIAGNOSIS — K59.1 FUNCTIONAL DIARRHEA: ICD-10-CM

## 2020-03-31 RX ORDER — CHOLESTYRAMINE 4 G/9G
4 POWDER, FOR SUSPENSION ORAL DAILY
Qty: 1 CAN | Refills: 4 | Status: SHIPPED | OUTPATIENT
Start: 2020-03-31 | End: 2020-10-21

## 2020-07-08 ENCOUNTER — APPOINTMENT (RX ONLY)
Dept: URBAN - METROPOLITAN AREA CLINIC 35 | Facility: CLINIC | Age: 65
Setting detail: DERMATOLOGY
End: 2020-07-08

## 2020-07-08 DIAGNOSIS — L81.4 OTHER MELANIN HYPERPIGMENTATION: ICD-10-CM

## 2020-07-08 DIAGNOSIS — L57.0 ACTINIC KERATOSIS: ICD-10-CM

## 2020-07-08 DIAGNOSIS — D22 MELANOCYTIC NEVI: ICD-10-CM

## 2020-07-08 DIAGNOSIS — Z85.828 PERSONAL HISTORY OF OTHER MALIGNANT NEOPLASM OF SKIN: ICD-10-CM

## 2020-07-08 DIAGNOSIS — Z85.820 PERSONAL HISTORY OF MALIGNANT MELANOMA OF SKIN: ICD-10-CM

## 2020-07-08 DIAGNOSIS — Z71.89 OTHER SPECIFIED COUNSELING: ICD-10-CM

## 2020-07-08 DIAGNOSIS — L82.1 OTHER SEBORRHEIC KERATOSIS: ICD-10-CM

## 2020-07-08 PROBLEM — D22.5 MELANOCYTIC NEVI OF TRUNK: Status: ACTIVE | Noted: 2020-07-08

## 2020-07-08 PROCEDURE — 17003 DESTRUCT PREMALG LES 2-14: CPT

## 2020-07-08 PROCEDURE — 17000 DESTRUCT PREMALG LESION: CPT

## 2020-07-08 PROCEDURE — ? COUNSELING

## 2020-07-08 PROCEDURE — ? LIQUID NITROGEN

## 2020-07-08 PROCEDURE — 99214 OFFICE O/P EST MOD 30 MIN: CPT | Mod: 25

## 2020-07-08 ASSESSMENT — LOCATION ZONE DERM
LOCATION ZONE: TRUNK
LOCATION ZONE: FACE
LOCATION ZONE: FINGER
LOCATION ZONE: ARM

## 2020-07-08 ASSESSMENT — LOCATION DETAILED DESCRIPTION DERM
LOCATION DETAILED: MIDDLE STERNUM
LOCATION DETAILED: LEFT MEDIAL SUPERIOR CHEST
LOCATION DETAILED: LEFT PROXIMAL DORSAL MIDDLE FINGER
LOCATION DETAILED: PERIUMBILICAL SKIN
LOCATION DETAILED: LEFT MEDIAL UPPER BACK
LOCATION DETAILED: RIGHT SUPERIOR UPPER BACK
LOCATION DETAILED: LEFT SUPERIOR FLANK
LOCATION DETAILED: LEFT SUPERIOR MEDIAL UPPER BACK
LOCATION DETAILED: RIGHT POSTERIOR SHOULDER
LOCATION DETAILED: RIGHT SUPERIOR CENTRAL MALAR CHEEK

## 2020-07-08 ASSESSMENT — LOCATION SIMPLE DESCRIPTION DERM
LOCATION SIMPLE: CHEST
LOCATION SIMPLE: LEFT UPPER BACK
LOCATION SIMPLE: LEFT MIDDLE FINGER
LOCATION SIMPLE: ABDOMEN
LOCATION SIMPLE: RIGHT UPPER BACK
LOCATION SIMPLE: RIGHT SHOULDER
LOCATION SIMPLE: RIGHT CHEEK

## 2020-07-08 NOTE — PROCEDURE: COUNSELING
Detail Level: Simple
Quality 224: Stage 0-Iic Melanoma: Overutilization Of Imaging Studies For Only Stage 0-Iic Melanoma: None of the following diagnostic imaging studies ordered: chest X-ray, CT, Ultrasound, MRI, PET, or nuclear medicine scans (ML)
Quality 137: Melanoma: Continuity Of Care - Recall System: Patient information entered into a recall system that includes: target date for the next exam specified AND a process to follow up with patients regarding missed or unscheduled appointments
Detail Level: Detailed
Detail Level: Generalized
Detail Level: Zone
When Should The Patient Follow-Up For Their Next Full-Body Skin Exam?: 6 Months

## 2020-07-08 NOTE — PROCEDURE: LIQUID NITROGEN
Detail Level: Detailed
Duration Of Freeze Thaw-Cycle (Seconds): 2
Post-Care Instructions: I reviewed with the patient in detail post-care instructions. Patient is to wear sunprotection, and avoid picking at any of the treated lesions. Pt may apply Vaseline to crusted or scabbing areas.
Render Post-Care Instructions In Note?: no
Number Of Freeze-Thaw Cycles: 2 freeze-thaw cycles
Consent: The patient's consent was obtained including but not limited to risks of crusting, scabbing, blistering, scarring, darker or lighter pigmentary change, recurrence, incomplete removal and infection.

## 2020-08-05 DIAGNOSIS — B00.1 COLD SORE: ICD-10-CM

## 2020-08-05 RX ORDER — VALACYCLOVIR HYDROCHLORIDE 1 G/1
1000 TABLET, FILM COATED ORAL 2 TIMES DAILY
Qty: 20 TAB | Refills: 3 | Status: SHIPPED | OUTPATIENT
Start: 2020-08-05 | End: 2023-02-10

## 2020-08-12 ENCOUNTER — APPOINTMENT (RX ONLY)
Dept: URBAN - METROPOLITAN AREA CLINIC 22 | Facility: CLINIC | Age: 65
Setting detail: DERMATOLOGY
End: 2020-08-12

## 2020-08-12 DIAGNOSIS — L82.0 INFLAMED SEBORRHEIC KERATOSIS: ICD-10-CM

## 2020-08-12 PROCEDURE — ? COUNSELING

## 2020-08-12 PROCEDURE — 17110 DESTRUCTION B9 LES UP TO 14: CPT

## 2020-08-12 PROCEDURE — ? LIQUID NITROGEN

## 2020-08-12 ASSESSMENT — LOCATION ZONE DERM: LOCATION ZONE: TRUNK

## 2020-08-12 ASSESSMENT — LOCATION DETAILED DESCRIPTION DERM: LOCATION DETAILED: LEFT MID-UPPER BACK

## 2020-08-12 ASSESSMENT — LOCATION SIMPLE DESCRIPTION DERM: LOCATION SIMPLE: LEFT UPPER BACK

## 2020-08-12 NOTE — PROCEDURE: MIPS QUALITY
Quality 137: Melanoma: Continuity Of Care - Recall System: Patient information entered into a recall system that includes: target date for the next exam specified AND a process to follow up with patients regarding missed or unscheduled appointments
Quality 226: Preventive Care And Screening: Tobacco Use: Screening And Cessation Intervention: Patient screened for tobacco use and is an ex/non-smoker
Quality 111:Pneumonia Vaccination Status For Older Adults: Pneumococcal Vaccination Previously Received
Quality 130: Documentation Of Current Medications In The Medical Record: Current Medications Documented
Detail Level: Detailed
Quality 397: Melanoma: Reporting: The pathology report includes a pT Category and statement on thickness and ulceration for pT1, mitotic rate.

## 2020-10-02 DIAGNOSIS — E78.5 DYSLIPIDEMIA: ICD-10-CM

## 2020-10-02 DIAGNOSIS — R79.89 ELEVATED TSH: ICD-10-CM

## 2020-10-02 RX ORDER — ATORVASTATIN CALCIUM 20 MG/1
TABLET, FILM COATED ORAL
Qty: 90 TAB | Refills: 3 | Status: SHIPPED | OUTPATIENT
Start: 2020-10-02 | End: 2021-09-06

## 2020-10-02 RX ORDER — LEVOTHYROXINE SODIUM 0.05 MG/1
TABLET ORAL
Qty: 90 TAB | Refills: 1 | Status: SHIPPED | OUTPATIENT
Start: 2020-10-02 | End: 2020-10-21

## 2020-10-03 ENCOUNTER — HOSPITAL ENCOUNTER (OUTPATIENT)
Dept: LAB | Facility: MEDICAL CENTER | Age: 65
End: 2020-10-03
Attending: INTERNAL MEDICINE
Payer: MEDICARE

## 2020-10-03 DIAGNOSIS — E53.8 VITAMIN B 12 DEFICIENCY: ICD-10-CM

## 2020-10-03 DIAGNOSIS — E03.9 HYPOTHYROIDISM, UNSPECIFIED TYPE: ICD-10-CM

## 2020-10-03 DIAGNOSIS — D35.2 PROLACTINOMA (HCC): ICD-10-CM

## 2020-10-03 DIAGNOSIS — E55.9 VITAMIN D DEFICIENCY: ICD-10-CM

## 2020-10-03 DIAGNOSIS — E27.40 ADRENAL INSUFFICIENCY (HCC): ICD-10-CM

## 2020-10-03 LAB
25(OH)D3 SERPL-MCNC: 76 NG/ML (ref 30–100)
CORTIS SERPL-MCNC: 14.2 UG/DL (ref 0–23)
PROLACTIN SERPL-MCNC: 3.66 NG/ML (ref 2.8–26)
T3FREE SERPL-MCNC: 2.93 PG/ML (ref 2–4.4)
T4 FREE SERPL-MCNC: 1.23 NG/DL (ref 0.93–1.7)
TSH SERPL DL<=0.005 MIU/L-ACNC: 1.15 UIU/ML (ref 0.38–5.33)
VIT B12 SERPL-MCNC: 2260 PG/ML (ref 211–911)

## 2020-10-03 PROCEDURE — 82533 TOTAL CORTISOL: CPT

## 2020-10-03 PROCEDURE — 82607 VITAMIN B-12: CPT

## 2020-10-03 PROCEDURE — 82024 ASSAY OF ACTH: CPT

## 2020-10-03 PROCEDURE — 84439 ASSAY OF FREE THYROXINE: CPT

## 2020-10-03 PROCEDURE — 84481 FREE ASSAY (FT-3): CPT

## 2020-10-03 PROCEDURE — 84146 ASSAY OF PROLACTIN: CPT

## 2020-10-03 PROCEDURE — 82306 VITAMIN D 25 HYDROXY: CPT

## 2020-10-03 PROCEDURE — 84443 ASSAY THYROID STIM HORMONE: CPT

## 2020-10-03 PROCEDURE — 36415 COLL VENOUS BLD VENIPUNCTURE: CPT

## 2020-10-04 NOTE — PROGRESS NOTES
Endocrinology Clinic Progress Note  PCP: Davidson Murillo M.D.    HPI:  Gerri Castelan is a 64 y.o. old patient who comes in today for review of endocrine problems.    Hypothyroidism:  Currently taking Levothyroxine 50 mcg daily.  Results for GERRI CASTELAN (MRN [id number]) as of 10/4/2020 15:22   Ref. Range 10/3/2020 08:20   TSH Latest Ref Range: 0.380 - 5.330 uIU/mL 1.150   Free T-4 Latest Ref Range: 0.93 - 1.70 ng/dL 1.23   T3,Free Latest Ref Range: 2.00 - 4.40 pg/mL 2.93     Prolactinoma:  Currently taking Bromocriptine 2.5 mg BID.  Ultrasound done on 3/5/20 shows continued decrease in size of mass in the floor of the sella consistent with treatment response.  Results for GERRI CASTELAN (MRN [id number]) as of 10/4/2020 15:22   Ref. Range 10/3/2020 08:20   Prolactin Latest Ref Range: 2.80 - 26.00 ng/mL 3.66        Vitamin D Deficiency:  Currently taking Vitamin D 10,000 units daily.  Results for GERRI CASTELAN (MRN [id number]) as of 10/4/2020 15:22   Ref. Range 10/3/2020 08:20   25-Hydroxy   Vitamin D 25 Latest Ref Range: 30 - 100 ng/mL 76        Vitamin B 12 Deficiency:  Currently taking Vitamin B 12 2500 mcg daily.  Results for GERRI CASTELAN (MRN [id number]) as of 10/4/2020 15:22   Ref. Range 10/3/2020 08:20   Vitamin B12 -True Cobalamin Latest Ref Range: 211 - 911 pg/mL 2260 (H)         ROS:  Constitutional: No unintentional weight loss  Endo: Denies excessive thirst or frequent urination  All other systems were reviewed and were negative.    Past Medical History:  Patient Active Problem List    Diagnosis Date Noted   • History of malignant melanoma 03/09/2020   • History of left breast cancer 03/09/2020   • History of bilateral mastectomy 03/09/2020   • Grade II hemorrhoids 03/09/2020   • Menopausal symptom 03/09/2020   • Acute infective otitis externa, right 05/07/2019   • Prolactinoma (HCC)    • Incidentally-noted pituitary tumor (HCC) 10/07/2018   • Pituitary adenoma with  extrasellar extension (HCC) 09/24/2018   • Pituitary macroadenoma (HCC) 09/14/2018   • Elevated fasting glucose 12/30/2016   • Abnormal echocardiogram 11/17/2016   • Family history of early CAD 10/17/2016   • Dyslipidemia 09/26/2016   • Idiopathic hypothyroidism 09/26/2016   • Chronic right shoulder pain 09/22/2016   • Frequent loose stools 09/22/2016   • Chronic migraine without aura without status migrainosus, not intractable    • Personal history of breast cancer    • Family history of breast cancer in mother 12/01/2010   • GERD (gastroesophageal reflux disease) 05/19/2009       Medications:    Current Outpatient Medications:   •  atorvastatin (LIPITOR) 20 MG Tab, TAKE 1 TABLET DAILY AT BEDTIME, Disp: 90 Tab, Rfl: 3  •  levothyroxine (SYNTHROID) 50 MCG Tab, TAKE 1 TABLET EVERY MORNING BEFORE BREAKFAST, Disp: 90 Tab, Rfl: 1  •  valacyclovir (VALTREX) 1 GM Tab, Take 1 Tab by mouth 2 times a day., Disp: 20 Tab, Rfl: 3  •  cholestyramine (QUESTRAN) 4 GM/DOSE powder, Take 4 g by mouth every day., Disp: 1 Can, Rfl: 4  •  VITAMIN D PO, Take 5,000 Units by mouth every day., Disp: , Rfl:   •  Cyanocobalamin (B-12) 1000 MCG Tab, , Disp: , Rfl:   •  omeprazole (PRILOSEC) 40 MG delayed-release capsule, Take 1 Cap by mouth every day., Disp: 90 Cap, Rfl: 3  •  Wheat Dextrin (BENEFIBER PO), Take  by mouth., Disp: , Rfl:   •  bromocriptine (PARLODEL) 2.5 MG Tab, TK 1 T PO BID, Disp: , Rfl: 0  •  Probiotic Product (PROBIOTIC DAILY PO), Take 1 Cap by mouth every day., Disp: , Rfl:     Labs: Reviewed    Physical Examination:  Vital signs: LMP 10/30/2004  There is no height or weight on file to calculate BMI.  General: No apparent distress, cooperative  Eyes: No scleral icterus, no discharge, normal eyelids  Neck: No abnormal masses on inspection, normal thyroid exam  Resp: Normal effort, clear to auscultation bilaterally  CVS: Regular rate and rhythm, S1 S2 normal, no murmur  Extremities: No lower extremity edema  Abdomen:  abdominal obesity present  Musculoskeletal: Normal digits and nails  Skin: No rash on visible skin  Psych: Alert and oriented, normal mood and affect, intact memory and able to make informed decisions.    Assessment and Plan:    1. Hypothyroidism, unspecified type  ***    2. Prolactinoma (HCC)  ***    3. Vitamin D deficiency  ***    4. Vitamin B 12 deficiency  ***      No follow-ups on file.    Total face to face time spent with patient equals 40 minutes. 25/40 minutes were spent on counseling the patient about the pathophysiology of pituitary-thyroid axis, pituitary-adrenal axis, pituitary-gonadal axis, natural history of thyroid nodules, side effects and benefits of thyroid hormone, testosterone, Vit D and Vit B12 replacement.    Thank you for allowing me to participate in the care of this patient.    Aliyah Crawford R.N.    CC:   Davidson Murillo M.D.    This note was created using voice recognition software (Dragon). The accuracy of the dictation is limited by the abilities of the software. I have reviewed the note prior to signing, however some errors in grammar and context are still possible. If you have any questions related to this note please do not hesitate to contact our office.

## 2020-10-06 ENCOUNTER — APPOINTMENT (OUTPATIENT)
Dept: ENDOCRINOLOGY | Facility: MEDICAL CENTER | Age: 65
End: 2020-10-06
Attending: INTERNAL MEDICINE
Payer: MEDICARE

## 2020-10-06 LAB — ACTH PLAS-MCNC: 20.5 PG/ML (ref 7.2–63.3)

## 2020-10-13 DIAGNOSIS — K21.9 GASTROESOPHAGEAL REFLUX DISEASE WITHOUT ESOPHAGITIS: ICD-10-CM

## 2020-10-15 RX ORDER — OMEPRAZOLE 40 MG/1
CAPSULE, DELAYED RELEASE ORAL
Qty: 90 CAP | Refills: 3 | Status: SHIPPED | OUTPATIENT
Start: 2020-10-15 | End: 2021-11-23 | Stop reason: SDUPTHER

## 2020-10-19 ENCOUNTER — TELEPHONE (OUTPATIENT)
Dept: MEDICAL GROUP | Facility: MEDICAL CENTER | Age: 65
End: 2020-10-19

## 2020-10-19 NOTE — PROGRESS NOTES
Endocrinology Clinic Progress Note  PCP: Davidson Murillo M.D.    HPI:  Gerri Castelan is a 64 y.o. old patient who is seen today for review of her endocrine problems.   1. Prolactinoma: currently on Bromocriptine 2.5 mg bid.     Prolactin Latest Ref Range: 2.80 - 26.00 ng/mL 15.87  3.66     2. Hypothyroid: currently on Levothyroxine 50 mcg daily.      Ref. Range 10/3/2020 08:20   TSH Latest Ref Range: 0.380 - 5.330 uIU/mL 1.150   Free T-4 Latest Ref Range: 0.93 - 1.70 ng/dL 1.23   T3,Free Latest Ref Range: 2.00 - 4.40 pg/mL 2.93   3. Vitamin D deficiency: currently on 5000 iu of Vitamin D daily.      Ref. Range 10/3/2020 08:20   25-Hydroxy   Vitamin D 25 Latest Ref Range: 30 - 100 ng/mL 76         Patient does have anemia and also complains of fatigue and shortness of breath on minimal exertion.    ROS:  Constitutional: fatigue,No weight loss  Cardiac: No palpitations or racing heart  Resp: shortness of breath on minimal exertion  Neuro: No numbness or tinging in feet  Endo: hot flashes, no polyuria or polydipsia  All other systems were reviewed and were negative.    Current Outpatient Medications   Medication Sig Dispense Refill   • Cyanocobalamin (VITAMIN B-12) 5000 MCG SL Tab Place  under tongue.     • levothyroxine (SYNTHROID) 75 MCG Tab Take 1 Tab by mouth Every morning on an empty stomach. 90 Tab 3   • omeprazole (PRILOSEC) 40 MG delayed-release capsule TAKE 1 CAPSULE DAILY 90 Cap 3   • atorvastatin (LIPITOR) 20 MG Tab TAKE 1 TABLET DAILY AT BEDTIME 90 Tab 3   • valacyclovir (VALTREX) 1 GM Tab Take 1 Tab by mouth 2 times a day. 20 Tab 3   • VITAMIN D PO Take 5,000 Units by mouth every day.     • bromocriptine (PARLODEL) 2.5 MG Tab TK 1 T PO BID  0   • Probiotic Product (PROBIOTIC DAILY PO) Take 1 Cap by mouth every day.       No current facility-administered medications for this visit.      Labs: Reviewed    Physical Examination:  Vital signs: BP (!) 90/58   Pulse 72   Temp 36.3 °C (97.4 °F)  "(Temporal)   Ht 1.651 m (5' 5\")   Wt 73 kg (161 lb)   LMP 10/30/2004   SpO2 97%   BMI 26.79 kg/m²  Body mass index is 26.79 kg/m².  General: No apparent distress, cooperative  Eyes: No scleral icterus or discharge  ENMT: Normal on external inspection of nose, lips, normal thyroid exam  Neck: No abnormal masses on inspection  Resp: Normal effort, clear to auscultation bilaterally   CVS: Regular rate and rhythm, S1 S2 normal, no murmur   Extremities: No edema  Abdomen: abdominal obesity present  Neuro: Alert and oriented  Skin: No rash  Psych: Normal mood and affect, intact memory and able to make informed decisions  Assessment and Plan:  1. Prolactinoma (HCC)  Continue bromocriptine 2.5 mg twice daily.  Prolactin levels are in the normal range now.  If the prolactin continues to stay in this range for good 2 years then we will plan to stop bromocriptine and see how she does.  This would be probably around October 2022    2. Other specified hypothyroidism  Increase levothyroxine to 75 mcg daily    3. Vitamin D deficiency  Continue vitamin D with food as per HPI    4. Hot flashes:   Candidate for estrogen therapy because of her estrogen sensitive breast cancer history.  Can try Amberen to see if she is has symptomatic relief.  She did not tolerate the evening primrose oil.    Return in about 3 months (around 1/21/2021).    Thank you for allowing me to participate in the care of this patient.    Amos Flores M.D.  10/19/20    CC:   Davidson Murillo M.D.    This note was created using voice recognition software (Dragon). The accuracy of the dictation is limited by the abilities of the software. I have reviewed the note prior to signing, however some errors in grammar and context are still possible. If you have any questions related to this note please do not hesitate to contact our office.   "

## 2020-10-20 ENCOUNTER — DOCUMENTATION (OUTPATIENT)
Dept: MEDICAL GROUP | Facility: MEDICAL CENTER | Age: 65
End: 2020-10-20

## 2020-10-20 NOTE — TELEPHONE ENCOUNTER
There is an insurance medical record request that was scanned into media October 8.  I believe this is what she is talking about.  It is unclear if the records were ever sent.

## 2020-10-21 ENCOUNTER — OFFICE VISIT (OUTPATIENT)
Dept: ENDOCRINOLOGY | Facility: MEDICAL CENTER | Age: 65
End: 2020-10-21
Attending: INTERNAL MEDICINE
Payer: MEDICARE

## 2020-10-21 VITALS
BODY MASS INDEX: 26.82 KG/M2 | SYSTOLIC BLOOD PRESSURE: 90 MMHG | OXYGEN SATURATION: 97 % | HEIGHT: 65 IN | WEIGHT: 161 LBS | HEART RATE: 72 BPM | TEMPERATURE: 97.4 F | DIASTOLIC BLOOD PRESSURE: 58 MMHG

## 2020-10-21 DIAGNOSIS — E53.8 VITAMIN B 12 DEFICIENCY: ICD-10-CM

## 2020-10-21 DIAGNOSIS — E55.9 VITAMIN D DEFICIENCY: ICD-10-CM

## 2020-10-21 DIAGNOSIS — E03.9 HYPOTHYROIDISM, UNSPECIFIED TYPE: ICD-10-CM

## 2020-10-21 DIAGNOSIS — E03.8 OTHER SPECIFIED HYPOTHYROIDISM: ICD-10-CM

## 2020-10-21 DIAGNOSIS — D35.2 PROLACTINOMA (HCC): ICD-10-CM

## 2020-10-21 PROCEDURE — 99214 OFFICE O/P EST MOD 30 MIN: CPT | Performed by: INTERNAL MEDICINE

## 2020-10-21 PROCEDURE — 99211 OFF/OP EST MAY X REQ PHY/QHP: CPT | Performed by: INTERNAL MEDICINE

## 2020-10-21 RX ORDER — LEVOTHYROXINE SODIUM 0.07 MG/1
75 TABLET ORAL
Qty: 90 TAB | Refills: 3 | Status: SHIPPED | OUTPATIENT
Start: 2020-10-21 | End: 2020-12-28 | Stop reason: SDUPTHER

## 2020-10-21 ASSESSMENT — FIBROSIS 4 INDEX: FIB4 SCORE: 1.65

## 2020-11-20 DIAGNOSIS — E03.9 IDIOPATHIC HYPOTHYROIDISM: ICD-10-CM

## 2020-11-20 DIAGNOSIS — D35.2 PITUITARY ADENOMA WITH EXTRASELLAR EXTENSION (HCC): ICD-10-CM

## 2020-11-20 DIAGNOSIS — D35.2 PROLACTINOMA (HCC): ICD-10-CM

## 2020-12-28 DIAGNOSIS — E03.8 OTHER SPECIFIED HYPOTHYROIDISM: ICD-10-CM

## 2020-12-28 RX ORDER — LEVOTHYROXINE SODIUM 0.07 MG/1
75 TABLET ORAL
Qty: 90 TAB | Refills: 0 | Status: SHIPPED | OUTPATIENT
Start: 2020-12-28 | End: 2020-12-29 | Stop reason: SDUPTHER

## 2020-12-29 DIAGNOSIS — E03.8 OTHER SPECIFIED HYPOTHYROIDISM: ICD-10-CM

## 2020-12-29 RX ORDER — LEVOTHYROXINE SODIUM 0.07 MG/1
75 TABLET ORAL
Qty: 90 TAB | Refills: 3 | Status: SHIPPED | OUTPATIENT
Start: 2020-12-29 | End: 2021-03-26

## 2021-01-12 ENCOUNTER — APPOINTMENT (RX ONLY)
Dept: URBAN - METROPOLITAN AREA CLINIC 22 | Facility: CLINIC | Age: 66
Setting detail: DERMATOLOGY
End: 2021-01-12

## 2021-01-12 DIAGNOSIS — Z85.828 PERSONAL HISTORY OF OTHER MALIGNANT NEOPLASM OF SKIN: ICD-10-CM

## 2021-01-12 DIAGNOSIS — L82.1 OTHER SEBORRHEIC KERATOSIS: ICD-10-CM

## 2021-01-12 DIAGNOSIS — D22 MELANOCYTIC NEVI: ICD-10-CM

## 2021-01-12 DIAGNOSIS — Z85.820 PERSONAL HISTORY OF MALIGNANT MELANOMA OF SKIN: ICD-10-CM

## 2021-01-12 DIAGNOSIS — L81.4 OTHER MELANIN HYPERPIGMENTATION: ICD-10-CM

## 2021-01-12 PROBLEM — D22.5 MELANOCYTIC NEVI OF TRUNK: Status: ACTIVE | Noted: 2021-01-12

## 2021-01-12 PROCEDURE — 99213 OFFICE O/P EST LOW 20 MIN: CPT

## 2021-01-12 PROCEDURE — ? COUNSELING

## 2021-01-12 PROCEDURE — ? SUNSCREEN TREATMENT REGIMEN

## 2021-01-12 ASSESSMENT — LOCATION SIMPLE DESCRIPTION DERM
LOCATION SIMPLE: RIGHT FOREARM
LOCATION SIMPLE: INFERIOR FOREHEAD
LOCATION SIMPLE: LEFT ANTERIOR NECK
LOCATION SIMPLE: ABDOMEN
LOCATION SIMPLE: LEFT FOREARM
LOCATION SIMPLE: UPPER BACK
LOCATION SIMPLE: RIGHT UPPER BACK
LOCATION SIMPLE: RIGHT SHOULDER

## 2021-01-12 ASSESSMENT — LOCATION DETAILED DESCRIPTION DERM
LOCATION DETAILED: INFERIOR MID FOREHEAD
LOCATION DETAILED: LEFT VENTRAL PROXIMAL FOREARM
LOCATION DETAILED: RIGHT SUPERIOR MEDIAL UPPER BACK
LOCATION DETAILED: SUPERIOR THORACIC SPINE
LOCATION DETAILED: LEFT SUPERIOR FLANK
LOCATION DETAILED: INFERIOR THORACIC SPINE
LOCATION DETAILED: RIGHT VENTRAL PROXIMAL FOREARM
LOCATION DETAILED: LEFT INFERIOR ANTERIOR NECK
LOCATION DETAILED: RIGHT POSTERIOR SHOULDER

## 2021-01-12 ASSESSMENT — LOCATION ZONE DERM
LOCATION ZONE: FACE
LOCATION ZONE: NECK
LOCATION ZONE: ARM
LOCATION ZONE: TRUNK

## 2021-01-12 NOTE — PROCEDURE: MIPS QUALITY
Quality 226: Preventive Care And Screening: Tobacco Use: Screening And Cessation Intervention: Patient screened for tobacco use and is an ex/non-smoker
Quality 111:Pneumonia Vaccination Status For Older Adults: Pneumococcal Vaccination Previously Received
Quality 130: Documentation Of Current Medications In The Medical Record: Current Medications Documented
Quality 137: Melanoma: Continuity Of Care - Recall System: Patient information entered into a recall system that includes: target date for the next exam specified AND a process to follow up with patients regarding missed or unscheduled appointments
Detail Level: Detailed

## 2021-01-21 ENCOUNTER — HOSPITAL ENCOUNTER (OUTPATIENT)
Dept: LAB | Facility: MEDICAL CENTER | Age: 66
End: 2021-01-21
Attending: INTERNAL MEDICINE
Payer: MEDICARE

## 2021-01-21 DIAGNOSIS — E03.8 OTHER SPECIFIED HYPOTHYROIDISM: ICD-10-CM

## 2021-01-21 DIAGNOSIS — E55.9 VITAMIN D DEFICIENCY: ICD-10-CM

## 2021-01-21 DIAGNOSIS — D35.2 PROLACTINOMA (HCC): ICD-10-CM

## 2021-01-21 DIAGNOSIS — E53.8 VITAMIN B 12 DEFICIENCY: ICD-10-CM

## 2021-01-21 LAB
25(OH)D3 SERPL-MCNC: 64 NG/ML (ref 30–100)
PROLACTIN SERPL-MCNC: 9.8 NG/ML (ref 2.8–26)
T3 SERPL-MCNC: 122 NG/DL (ref 60–181)
T3FREE SERPL-MCNC: 3.44 PG/ML (ref 2–4.4)
T4 FREE SERPL-MCNC: 1.51 NG/DL (ref 0.93–1.7)
TSH SERPL DL<=0.005 MIU/L-ACNC: 0.13 UIU/ML (ref 0.38–5.33)
VIT B12 SERPL-MCNC: 1859 PG/ML (ref 211–911)

## 2021-01-21 PROCEDURE — 82607 VITAMIN B-12: CPT

## 2021-01-21 PROCEDURE — 84443 ASSAY THYROID STIM HORMONE: CPT

## 2021-01-21 PROCEDURE — 84146 ASSAY OF PROLACTIN: CPT

## 2021-01-21 PROCEDURE — 82306 VITAMIN D 25 HYDROXY: CPT

## 2021-01-21 PROCEDURE — 36415 COLL VENOUS BLD VENIPUNCTURE: CPT

## 2021-01-21 PROCEDURE — 84439 ASSAY OF FREE THYROXINE: CPT

## 2021-01-21 PROCEDURE — 84481 FREE ASSAY (FT-3): CPT

## 2021-01-21 PROCEDURE — 84480 ASSAY TRIIODOTHYRONINE (T3): CPT

## 2021-01-27 ENCOUNTER — TELEMEDICINE (OUTPATIENT)
Dept: ENDOCRINOLOGY | Facility: MEDICAL CENTER | Age: 66
End: 2021-01-27
Attending: INTERNAL MEDICINE
Payer: MEDICARE

## 2021-01-27 DIAGNOSIS — E03.9 ACQUIRED HYPOTHYROIDISM: ICD-10-CM

## 2021-01-27 DIAGNOSIS — E53.8 VITAMIN B 12 DEFICIENCY: ICD-10-CM

## 2021-01-27 DIAGNOSIS — E55.9 VITAMIN D DEFICIENCY: ICD-10-CM

## 2021-01-27 DIAGNOSIS — D35.2 PROLACTINOMA, BENIGN (HCC): ICD-10-CM

## 2021-01-27 PROCEDURE — 99214 OFFICE O/P EST MOD 30 MIN: CPT | Mod: 95,CR | Performed by: NURSE PRACTITIONER

## 2021-01-27 RX ORDER — BIOTIN 10000 MCG
CAPSULE ORAL
COMMUNITY
End: 2021-11-23

## 2021-01-27 NOTE — PROGRESS NOTES
Chief Complaint: Follow up for Prolactinoma, Primary Hypothyroidism, Vitamin D Deficiency & Vitamin B Deficiency.   Previously seen by Dr. Flores.  Patient was presented for a telehealth consultation via secure and encrypted videoconferencing technology. This encounter was conducted via Zoom . Verbal consent was obtained. Patient's identity was verified.    HPI:     Gerri Castelan is a 65 y.o. female for continued evaluation & treatment of the followin. Prolactinoma   Currently on Bromocriptine 2.5 mg bid.   She is on the Bromocriptine 2.5 mg bid for the High prolactin level since around 2018.  MRI 2020:  IMPRESSION: Continued decrease in size of mass in the floor of the sella consistent with treatment response. Mild chronic microvascular ischemic type changes.  Denies galactorrhea, headache.      Ref. Range 2021 11:23   Prolactin Latest Ref Range: 2.80 - 26.00 ng/mL 9.80       2.  Primary Hypothyroidism  Currently on Levothyroxine 75 mcg daily, which she takes on an empty stomach before breakfast. Recently increased form 50mcg on 10/21/2020.   Pt reports adequate compliance and denies missing any daily doses.    Pt taking any iron, calcium supplements or antacids.      Weight-no weight change.    Decreased fatigue with increase in Levothyroxine since last visit 10/21/2020. Denies constipation, cold intolerance, heart palpitations & tremors.      Ref. Range 2021 11:23   TSH Latest Ref Range: 0.380 - 5.330 uIU/mL 0.130 (L)   Free T-4 Latest Ref Range: 0.93 - 1.70 ng/dL 1.51   T3 Latest Ref Range: 60.0 - 181.0 ng/dL 122.0   T3,Free Latest Ref Range: 2.00 - 4.40 pg/mL 3.44       3. Vitamin D Deficiency  Currently taking 5000 iu of Vitamin D daily.      Ref. Range 2021 11:23   25-Hydroxy   Vitamin D 25 Latest Ref Range: 30 - 100 ng/mL 64       4. Vitamin B Deficiency   Currently taking Vitamin B12 5000mcg SL Daily and Biotin 1 pill daily.     Ref. Range 2021 11:23    Vitamin B12 -True Cobalamin Latest Ref Range: 211 - 911 pg/mL 1859 (H)       Patient's medications, allergies, and social histories were reviewed and updated as appropriate.      ROS:     CONS:     No fever, no chills   EYES:     No diplopia, no blurry vision   CV:           No chest pain, no palpitations   PULM:     No SOB, no cough, no hemoptysis.   GI:            No nausea, no vomiting, no diarrhea, no constipation   ENDO:     No polyuria, no polydipsia, no heat intolerance, no cold intolerance       Past Medical History:  Problem List:  2020-03: History of malignant melanoma  2020-03: History of left breast cancer  2020-03: History of bilateral mastectomy  2020-03: Grade II hemorrhoids  2020-03: Menopausal symptom  2019-05: Acute infective otitis externa, right  2018-10: Incidentally-noted pituitary tumor (HCC)  2018-09: Pituitary adenoma with extrasellar extension (HCC)  2018-09: Pituitary macroadenoma (HCC)  2018-09: Amnesia  2018-09: Hypothyroidism  2016-12: Elevated fasting glucose  2016-11: Abnormal echocardiogram  2016-10: Family history of early CAD  2016-10: History of TIA (transient ischemic attack)  2016-09: Dyslipidemia  2016-09: Idiopathic hypothyroidism  2016-09: Altered mental status  2016-09: Chronic right shoulder pain  2016-09: Frequent loose stools  2016-03: Elevated TSH  2012-12: Enlarged gallbladder  2012-11: Gallbladder polyp  2011-02: Melanoma (HCC)  2010-12: Family history of breast cancer in mother  2009-05: GERD (gastroesophageal reflux disease)  Personal history of breast cancer  Chronic migraine without aura without status migrainosus, not   intractable  Prolactinoma (HCC)      Past Surgical History:  Past Surgical History:   Procedure Laterality Date   • ANAIS BY LAPAROSCOPY  12/6/2012    Performed by Yessica Ocampo M.D. at SURGERY [facility] ORS   • NIPPLE RECONSTRUCTION  12/1/2011    Performed by PATEL DALTON at SURGERY [facility] ORS   • FLAP GRAFT  12/1/2011    Performed  by PATEL DALTON at SURGERY [facility]   • BREAST RECONSTRUCTION  7/15/2011    Performed by PATEL DALTON at SURGERY [facility]   • TISSUE EXPANDER PLACE/REMOVE  7/15/2011    Performed by PATEL DALTON at SURGERY [facility]   • BREAST IMPLANT REVISION  7/15/2011    Performed by PATEL DALTON at [facility]   • CAPSULECTOMY  7/15/2011    Performed by PATEL DALTON at SURGERY [facility]   • TISSUE TRANSFER/REARRANGE  7/15/2011    Performed by PATEL DALTON at [facility]   • MASTECTOMY  3/29/2011    Performed by HANNAH LEVIN at SURGERY [facility] ORS   • BREAST RECONSTRUCTION  3/29/2011    Performed by PATEL DALTON at SURGERY [facility] ORS   • TISSUE EXPANDER PLACE/REMOVE  3/29/2011    Performed by PATEL DALTON at SURGERY [facility] ORS   • BREAST BIOPSY  2/24/2011    Performed by HANNAH LEVIN at SURGERY SAME DAY [facility] ORS   • LUMPECTOMY  1/19/2011    Performed by HANNAH LEVIN at SURGERY SAME DAY [facility] ORS   • NODE BIOPSY SENTINEL  1/19/2011    Performed by HANNAH LEVIN at SURGERY SAME DAY [facility] ORS   • COLONOSCOPY  2/2005    normal, due 2015   • OTHER ORTHOPEDIC SURGERY  1997    knee surgery ACL repair   • TUBAL LIGATION  1987   • OTHER  1983    malignant melanoma skin cancer upper back        Allergies:  Amoxicillin and Arimidex [anastrozole]     Social History:  Social History     Tobacco Use   • Smoking status: Never Smoker   • Smokeless tobacco: Never Used   Substance Use Topics   • Alcohol use: Yes     Alcohol/week: 0.6 oz     Types: 1 Standard drinks or equivalent per week     Comment: rarely   • Drug use: No        Family History:   family history includes Cancer (age of onset: 60) in her mother; Dementia in her mother; Diabetes (age of onset: 54) in her brother; Heart Attack in her brother, maternal grandfather, and sister; Heart Disease (age of onset: 52) in her sister; Hyperlipidemia in her  mother.      PHYSICAL EXAM:   Vital signs: Virtual Visit  GENERAL: Well-developed, well-nourished in no apparent distress.   EYE:  No ocular asymmetry, PERRLA  HENT: Pink, moist mucous membranes.    NECK: No thyromegaly.   CARDIOVASCULAR:  No murmurs  LUNGS: Clear breath sounds  ABDOMEN: Soft, nontender   EXTREMITIES: No clubbing, cyanosis, or edema.   NEUROLOGICAL: No gross focal motor abnormalities   LYMPH: No cervical adenopathy seen.   SKIN: No rashes, lesions.       ASSESSMENT/PLAN:     1. Prolactinoma, benign (HCC)  Stable  Continue Bromocriptine 2.5mg BID.   If the prolactin continues to stay in this range for good 2 years then we will plan to stop bromocriptine and see how she does. Approximately October 2022.    2. Acquired hypothyroidism  Stable  Continue Levothyroxine 75 mcg daily    3. Vitamin D deficiency  Stable  Currently taking 5000 iu of Vitamin D daily.    4. Vitamin B 12 deficiency  Stable  Currently taking Vitamin B12 5000mcg SL Daily and Biotin 1 pill daily.    Repeat labs 1 week prior to follow up appointment.   Follow up appointment in 5 months.     Thank you kindly for allowing me to participate in the thyroid care plan for this patient.    Desire Carpenter, APRN  01/27/21    CC:   Davidson Murillo M.D.

## 2021-02-23 DIAGNOSIS — R79.0 LOW FERRITIN LEVEL: ICD-10-CM

## 2021-02-23 DIAGNOSIS — E61.1 IRON DEFICIENCY: ICD-10-CM

## 2021-02-23 DIAGNOSIS — D50.9 IRON DEFICIENCY ANEMIA, UNSPECIFIED IRON DEFICIENCY ANEMIA TYPE: ICD-10-CM

## 2021-02-26 ENCOUNTER — HOSPITAL ENCOUNTER (OUTPATIENT)
Dept: LAB | Facility: MEDICAL CENTER | Age: 66
End: 2021-02-26
Attending: FAMILY MEDICINE
Payer: MEDICARE

## 2021-02-26 DIAGNOSIS — R79.0 LOW FERRITIN LEVEL: ICD-10-CM

## 2021-02-26 DIAGNOSIS — D50.9 IRON DEFICIENCY ANEMIA, UNSPECIFIED IRON DEFICIENCY ANEMIA TYPE: ICD-10-CM

## 2021-02-26 DIAGNOSIS — E61.1 IRON DEFICIENCY: ICD-10-CM

## 2021-02-26 LAB
BASOPHILS # BLD AUTO: 0.7 % (ref 0–1.8)
BASOPHILS # BLD: 0.03 K/UL (ref 0–0.12)
EOSINOPHIL # BLD AUTO: 0.05 K/UL (ref 0–0.51)
EOSINOPHIL NFR BLD: 1.2 % (ref 0–6.9)
ERYTHROCYTE [DISTWIDTH] IN BLOOD BY AUTOMATED COUNT: 49.1 FL (ref 35.9–50)
FERRITIN SERPL-MCNC: 23.7 NG/ML (ref 10–291)
HCT VFR BLD AUTO: 41.5 % (ref 37–47)
HGB BLD-MCNC: 13.4 G/DL (ref 12–16)
IMM GRANULOCYTES # BLD AUTO: 0.01 K/UL (ref 0–0.11)
IMM GRANULOCYTES NFR BLD AUTO: 0.2 % (ref 0–0.9)
IRON SATN MFR SERPL: 30 % (ref 15–55)
IRON SERPL-MCNC: 97 UG/DL (ref 40–170)
LYMPHOCYTES # BLD AUTO: 1.14 K/UL (ref 1–4.8)
LYMPHOCYTES NFR BLD: 28.1 % (ref 22–41)
MCH RBC QN AUTO: 29.8 PG (ref 27–33)
MCHC RBC AUTO-ENTMCNC: 32.3 G/DL (ref 33.6–35)
MCV RBC AUTO: 92.4 FL (ref 81.4–97.8)
MONOCYTES # BLD AUTO: 0.34 K/UL (ref 0–0.85)
MONOCYTES NFR BLD AUTO: 8.4 % (ref 0–13.4)
NEUTROPHILS # BLD AUTO: 2.49 K/UL (ref 2–7.15)
NEUTROPHILS NFR BLD: 61.4 % (ref 44–72)
NRBC # BLD AUTO: 0 K/UL
NRBC BLD-RTO: 0 /100 WBC
PLATELET # BLD AUTO: 200 K/UL (ref 164–446)
PMV BLD AUTO: 11.3 FL (ref 9–12.9)
RBC # BLD AUTO: 4.49 M/UL (ref 4.2–5.4)
TIBC SERPL-MCNC: 326 UG/DL (ref 250–450)
TRANSFERRIN SERPL-MCNC: 261 MG/DL (ref 200–370)
UIBC SERPL-MCNC: 229 UG/DL (ref 110–370)
WBC # BLD AUTO: 4.1 K/UL (ref 4.8–10.8)

## 2021-02-26 PROCEDURE — 83540 ASSAY OF IRON: CPT

## 2021-02-26 PROCEDURE — 82728 ASSAY OF FERRITIN: CPT

## 2021-02-26 PROCEDURE — 84466 ASSAY OF TRANSFERRIN: CPT

## 2021-02-26 PROCEDURE — 83550 IRON BINDING TEST: CPT | Mod: XU

## 2021-02-26 PROCEDURE — 85025 COMPLETE CBC W/AUTO DIFF WBC: CPT

## 2021-02-26 PROCEDURE — 36415 COLL VENOUS BLD VENIPUNCTURE: CPT

## 2021-03-03 DIAGNOSIS — Z23 NEED FOR VACCINATION: ICD-10-CM

## 2021-03-22 DIAGNOSIS — E03.9 IDIOPATHIC HYPOTHYROIDISM: ICD-10-CM

## 2021-03-22 DIAGNOSIS — D35.2 PITUITARY MACROADENOMA (HCC): ICD-10-CM

## 2021-03-26 DIAGNOSIS — E03.8 OTHER SPECIFIED HYPOTHYROIDISM: ICD-10-CM

## 2021-03-26 RX ORDER — LEVOTHYROXINE SODIUM 0.07 MG/1
TABLET ORAL
Qty: 90 TABLET | Refills: 3 | Status: SHIPPED | OUTPATIENT
Start: 2021-03-26 | End: 2022-01-20

## 2021-03-26 NOTE — TELEPHONE ENCOUNTER
Received request via: Pharmacy    Was the patient seen in the last year in this department? Yes    Does the patient have an active prescription (recently filled or refills available) for medication(s) requested? No       levothyroxine (SYNTHROID) 75 MCG Tab       Sig: TAKE 1 TABLET BY MOUTH EVERY MORNING ON AN EMPTY STOMACH

## 2021-04-08 DIAGNOSIS — D35.2 PITUITARY ADENOMA WITH EXTRASELLAR EXTENSION (HCC): ICD-10-CM

## 2021-04-08 DIAGNOSIS — D35.2 PROLACTINOMA (HCC): ICD-10-CM

## 2021-04-08 NOTE — PROGRESS NOTES
She is due for her yearly MRI of her pituitary mass.  The last MRI was stable.  She continues treatment.  Order is placed.  If there is enlargement or abnormality she will be referred back to neurosurgery.

## 2021-04-10 ENCOUNTER — IMMUNIZATION (OUTPATIENT)
Dept: FAMILY PLANNING/WOMEN'S HEALTH CLINIC | Facility: IMMUNIZATION CENTER | Age: 66
End: 2021-04-10
Attending: INTERNAL MEDICINE
Payer: MEDICARE

## 2021-04-10 DIAGNOSIS — Z23 ENCOUNTER FOR VACCINATION: Primary | ICD-10-CM

## 2021-04-10 PROCEDURE — 0012A MODERNA SARS-COV-2 VACCINE: CPT

## 2021-04-10 PROCEDURE — 91301 MODERNA SARS-COV-2 VACCINE: CPT

## 2021-04-12 DIAGNOSIS — F40.240 CLAUSTROPHOBIA: ICD-10-CM

## 2021-04-12 RX ORDER — DIAZEPAM 5 MG/1
5 TABLET ORAL SEE ADMIN INSTRUCTIONS
Qty: 2 TABLET | Refills: 0 | Status: SHIPPED | OUTPATIENT
Start: 2021-04-12 | End: 2021-04-19

## 2021-05-12 ENCOUNTER — HOSPITAL ENCOUNTER (OUTPATIENT)
Dept: RADIOLOGY | Facility: MEDICAL CENTER | Age: 66
End: 2021-05-12
Attending: FAMILY MEDICINE
Payer: MEDICARE

## 2021-05-12 DIAGNOSIS — D35.2 PROLACTINOMA (HCC): ICD-10-CM

## 2021-05-12 DIAGNOSIS — D35.2 PITUITARY ADENOMA WITH EXTRASELLAR EXTENSION (HCC): ICD-10-CM

## 2021-05-12 PROCEDURE — 70553 MRI BRAIN STEM W/O & W/DYE: CPT | Mod: MG

## 2021-05-12 PROCEDURE — A9576 INJ PROHANCE MULTIPACK: HCPCS | Performed by: FAMILY MEDICINE

## 2021-05-12 PROCEDURE — 700117 HCHG RX CONTRAST REV CODE 255: Performed by: FAMILY MEDICINE

## 2021-05-12 RX ADMIN — GADOTERIDOL 14 ML: 279.3 INJECTION, SOLUTION INTRAVENOUS at 15:18

## 2021-06-30 ENCOUNTER — OFFICE VISIT (OUTPATIENT)
Dept: ENDOCRINOLOGY | Facility: MEDICAL CENTER | Age: 66
End: 2021-06-30
Attending: NURSE PRACTITIONER
Payer: MEDICARE

## 2021-06-30 DIAGNOSIS — E03.9 HYPOTHYROIDISM, UNSPECIFIED TYPE: ICD-10-CM

## 2021-06-30 DIAGNOSIS — D35.2 PITUITARY MACROADENOMA (HCC): ICD-10-CM

## 2021-06-30 DIAGNOSIS — E55.9 VITAMIN D DEFICIENCY: ICD-10-CM

## 2021-06-30 DIAGNOSIS — E53.8 VITAMIN B 12 DEFICIENCY: ICD-10-CM

## 2021-06-30 PROCEDURE — 99214 OFFICE O/P EST MOD 30 MIN: CPT | Performed by: NURSE PRACTITIONER

## 2021-06-30 NOTE — PROGRESS NOTES
Chief Complaint: Follow up for Prolactinoma, Primary Hypothyroidism, Vitamin D Deficiency & Vitamin B Deficiency.   Previously seen by Dr. Flores.  Patient was presented for a telehealth consultation via secure and encrypted videoconferencing technology. This encounter was conducted via Zoom . Verbal consent was obtained. Patient's identity was verified.    HPI:     Gerri Castelan is a 65 y.o. female for continued evaluation & treatment of the following:     Patient is currently traveling for vacation until the first week in July.  Patient was unable to complete lab assay prior to this appointment.  She will complete this since she is from her vacation.    1. Prolactinoma   Currently on Bromocriptine 2.5 mg bid.   History of  High prolactin level since approximately October 2018.  MRI 05/12/2021:  IMPRESSION: 1.  Further interval decrease in size of the sellar mass  2.  MRI of the brain otherwise unremarkable for age with mild atrophy and mild white matter changes    Denies galactorrhea, headache.      Ref. Range 1/21/2021 11:23   Prolactin Latest Ref Range: 2.80 - 26.00 ng/mL 9.80       2.  Primary Hypothyroidism  Currently on Levothyroxine 75 mcg daily, which she takes on an empty stomach before breakfast. Recently increased form 50mcg on 10/21/2020.   Pt reports adequate compliance and denies missing any daily doses.    Pt taking any iron, calcium supplements or antacids.      Weight-no weight change.    Decreased fatigue with increase in Levothyroxine since last visit 10/21/2020. Denies constipation, cold intolerance, heart palpitations & tremors.      Ref. Range 1/21/2021 11:23   TSH Latest Ref Range: 0.380 - 5.330 uIU/mL 0.130 (L)   Free T-4 Latest Ref Range: 0.93 - 1.70 ng/dL 1.51   T3 Latest Ref Range: 60.0 - 181.0 ng/dL 122.0   T3,Free Latest Ref Range: 2.00 - 4.40 pg/mL 3.44       3. Vitamin D Deficiency  Currently taking 5000 iu of Vitamin D daily.      Ref. Range 1/21/2021 11:23   25-Hydroxy    Vitamin D 25 Latest Ref Range: 30 - 100 ng/mL 64       4. Vitamin B Deficiency   Currently taking Vitamin B12 5000mcg SL Daily and Biotin 1 pill daily.     Ref. Range 1/21/2021 11:23   Vitamin B12 -True Cobalamin Latest Ref Range: 211 - 911 pg/mL 1859 (H)       Patient's medications, allergies, and social histories were reviewed and updated as appropriate.      ROS:     CONS:     No fever, no chills   EYES:     No diplopia, no blurry vision   CV:           No chest pain, no palpitations   PULM:     No SOB, no cough, no hemoptysis.   GI:            No nausea, no vomiting, no diarrhea, no constipation   ENDO:     No polyuria, no polydipsia, no heat intolerance, no cold intolerance       Past Medical History:  Problem List:  2020-03: History of malignant melanoma  2020-03: History of left breast cancer  2020-03: History of bilateral mastectomy  2020-03: Grade II hemorrhoids  2020-03: Menopausal symptom  2019-05: Acute infective otitis externa, right  2018-10: Incidentally-noted pituitary tumor (HCC)  2018-09: Pituitary adenoma with extrasellar extension (HCC)  2018-09: Pituitary macroadenoma (HCC)  2018-09: Amnesia  2018-09: Hypothyroidism  2016-12: Elevated fasting glucose  2016-11: Abnormal echocardiogram  2016-10: Family history of early CAD  2016-10: History of TIA (transient ischemic attack)  2016-09: Dyslipidemia  2016-09: Idiopathic hypothyroidism  2016-09: Altered mental status  2016-09: Chronic right shoulder pain  2016-09: Frequent loose stools  2016-03: Elevated TSH  2012-12: Enlarged gallbladder  2012-11: Gallbladder polyp  2011-02: Melanoma (HCC)  2010-12: Family history of breast cancer in mother  2009-05: GERD (gastroesophageal reflux disease)  Personal history of breast cancer  Chronic migraine without aura without status migrainosus, not   intractable  Prolactinoma (HCC)      Past Surgical History:  Past Surgical History:   Procedure Laterality Date   • ANAIS BY LAPAROSCOPY  12/6/2012    Performed by  Yessica Levin M.D. at SURGERY [facility]   • NIPPLE RECONSTRUCTION  12/1/2011    Performed by PATEL DALTON at SURGERY [facility]   • FLAP GRAFT  12/1/2011    Performed by PATEL DALTON at SURGERY [facility]   • BREAST RECONSTRUCTION  7/15/2011    Performed by PATEL DALTON at SURGERY [facility]   • TISSUE EXPANDER PLACE/REMOVE  7/15/2011    Performed by PATEL DALTON at SURGERY [facility]   • BREAST IMPLANT REVISION  7/15/2011    Performed by PATEL DALTON at SUR[facility]ERY [facility]   • CAPSULECTOMY  7/15/2011    Performed by PATEL DALTON at SUR[facility]ERY [facility]   • TISSUE TRANSFER/REARRANGE  7/15/2011    Performed by PATEL DALTON at [facility]   • MASTECTOMY  3/29/2011    Performed by YESSICA LEVIN at SURGERY [facility]   • BREAST RECONSTRUCTION  3/29/2011    Performed by PATEL DALTON at SURGERY [facility]   • TISSUE EXPANDER PLACE/REMOVE  3/29/2011    Performed by PATEL DALTON at SURGERY [facility]   • BREAST BIOPSY  2/24/2011    Performed by YESSICA LEVIN at SURGERY [facility]   • LUMPECTOMY  1/19/2011    Performed by YESSICA LEVIN at SURGERY [facility]   • NODE BIOPSY SENTINEL  1/19/2011    Performed by YESSICA LEVIN at SURGERY [facility]   • COLONOSCOPY  2/2005    normal, due 2015   • OTHER ORTHOPEDIC SURGERY  1997    knee surgery ACL repair   • TUBAL LIGATION  1987   • OTHER  1983    malignant melanoma skin cancer upper back        Allergies:  Amoxicillin and Arimidex [anastrozole]     Social History:  Social History     Tobacco Use   • Smoking status: Never Smoker   • Smokeless tobacco: Never Used   Vaping Use   • Vaping Use: Never used   Substance Use Topics   • Alcohol use: Yes     Alcohol/week: 0.6 oz     Types: 1 Standard drinks or equivalent per week     Comment: rarely   • Drug use: No        Family History:   family history includes Cancer (age of onset: 60) in her  mother; Dementia in her mother; Diabetes (age of onset: 54) in her brother; Heart Attack in her brother, maternal grandfather, and sister; Heart Disease (age of onset: 52) in her sister; Hyperlipidemia in her mother.      PHYSICAL EXAM:   Vital signs: Virtual Visit  GENERAL: Well-developed, well-nourished in no apparent distress.   EYE:  No ocular asymmetry, PERRLA  HENT: Pink, moist mucous membranes.    NECK: No thyromegaly.   CARDIOVASCULAR:  No murmurs  LUNGS: Clear breath sounds  ABDOMEN: Soft, nontender   EXTREMITIES: No clubbing, cyanosis, or edema.   NEUROLOGICAL: No gross focal motor abnormalities   LYMPH: No cervical adenopathy seen.   SKIN: No rashes, lesions.       ASSESSMENT/PLAN:     1. Prolactinoma, benign (HCC)  Stable.  Continue Bromocriptine 2.5mg BID.   If the prolactin continues to stay in this range for good 2 years then we will plan to stop bromocriptine and see how she does. Approximately October 2022.    2. Acquired hypothyroidism  Stable.  Continue Levothyroxine 75 mcg daily    3. Vitamin D deficiency  Stable.  Currently taking 5000 iu of Vitamin D daily.    4. Vitamin B 12 deficiency  Stable.  Currently taking Vitamin B12 5000mcg SL Daily and Biotin 1 pill daily.    Repeat labs 1 week prior to follow up appointment.   Follow up appointment in 6 months.       Thank you kindly for allowing me to participate in the thyroid care plan for this patient.    Desire Carpenter, APRN  06/30/2021    CC:   Davidson Murillo M.D.

## 2021-07-20 ENCOUNTER — APPOINTMENT (RX ONLY)
Dept: URBAN - METROPOLITAN AREA CLINIC 22 | Facility: CLINIC | Age: 66
Setting detail: DERMATOLOGY
End: 2021-07-20

## 2021-07-20 DIAGNOSIS — D22 MELANOCYTIC NEVI: ICD-10-CM

## 2021-07-20 DIAGNOSIS — L81.4 OTHER MELANIN HYPERPIGMENTATION: ICD-10-CM

## 2021-07-20 DIAGNOSIS — L82.1 OTHER SEBORRHEIC KERATOSIS: ICD-10-CM

## 2021-07-20 DIAGNOSIS — Z85.820 PERSONAL HISTORY OF MALIGNANT MELANOMA OF SKIN: ICD-10-CM

## 2021-07-20 DIAGNOSIS — Z85.828 PERSONAL HISTORY OF OTHER MALIGNANT NEOPLASM OF SKIN: ICD-10-CM

## 2021-07-20 DIAGNOSIS — D18.0 HEMANGIOMA: ICD-10-CM

## 2021-07-20 PROBLEM — D22.5 MELANOCYTIC NEVI OF TRUNK: Status: ACTIVE | Noted: 2021-07-20

## 2021-07-20 PROBLEM — D18.01 HEMANGIOMA OF SKIN AND SUBCUTANEOUS TISSUE: Status: ACTIVE | Noted: 2021-07-20

## 2021-07-20 PROCEDURE — ? SUNSCREEN TREATMENT REGIMEN

## 2021-07-20 PROCEDURE — 99213 OFFICE O/P EST LOW 20 MIN: CPT

## 2021-07-20 PROCEDURE — ? COUNSELING

## 2021-07-20 ASSESSMENT — LOCATION SIMPLE DESCRIPTION DERM
LOCATION SIMPLE: LEFT ANTERIOR NECK
LOCATION SIMPLE: ABDOMEN
LOCATION SIMPLE: LEFT FOREARM
LOCATION SIMPLE: RIGHT FOREARM
LOCATION SIMPLE: UPPER BACK
LOCATION SIMPLE: RIGHT SHOULDER
LOCATION SIMPLE: INFERIOR FOREHEAD
LOCATION SIMPLE: RIGHT UPPER BACK

## 2021-07-20 ASSESSMENT — LOCATION DETAILED DESCRIPTION DERM
LOCATION DETAILED: RIGHT VENTRAL PROXIMAL FOREARM
LOCATION DETAILED: RIGHT SUPERIOR MEDIAL UPPER BACK
LOCATION DETAILED: LEFT VENTRAL PROXIMAL FOREARM
LOCATION DETAILED: EPIGASTRIC SKIN
LOCATION DETAILED: INFERIOR MID FOREHEAD
LOCATION DETAILED: LEFT INFERIOR ANTERIOR NECK
LOCATION DETAILED: SUPERIOR THORACIC SPINE
LOCATION DETAILED: RIGHT POSTERIOR SHOULDER
LOCATION DETAILED: INFERIOR THORACIC SPINE
LOCATION DETAILED: LEFT SUPERIOR FLANK

## 2021-07-20 ASSESSMENT — LOCATION ZONE DERM
LOCATION ZONE: FACE
LOCATION ZONE: TRUNK
LOCATION ZONE: NECK
LOCATION ZONE: ARM

## 2021-09-01 DIAGNOSIS — K59.1 FUNCTIONAL DIARRHEA: ICD-10-CM

## 2021-09-01 RX ORDER — CHOLESTYRAMINE 4 G/9G
4 POWDER, FOR SUSPENSION ORAL DAILY
Qty: 378 G | Refills: 2 | Status: SHIPPED | OUTPATIENT
Start: 2021-09-01 | End: 2022-07-29

## 2021-09-06 DIAGNOSIS — E78.5 DYSLIPIDEMIA: ICD-10-CM

## 2021-09-06 RX ORDER — ATORVASTATIN CALCIUM 20 MG/1
TABLET, FILM COATED ORAL
Qty: 90 TABLET | Refills: 3 | Status: SHIPPED | OUTPATIENT
Start: 2021-09-06 | End: 2022-09-02

## 2021-10-05 ENCOUNTER — NON-PROVIDER VISIT (OUTPATIENT)
Dept: MEDICAL GROUP | Facility: MEDICAL CENTER | Age: 66
End: 2021-10-05
Payer: MEDICARE

## 2021-10-05 DIAGNOSIS — Z23 NEED FOR VACCINATION: ICD-10-CM

## 2021-10-05 DIAGNOSIS — Z23 NEED FOR VACCINATION FOR PNEUMOCOCCUS: ICD-10-CM

## 2021-10-05 PROCEDURE — G0008 ADMIN INFLUENZA VIRUS VAC: HCPCS | Performed by: FAMILY MEDICINE

## 2021-10-05 PROCEDURE — 90662 IIV NO PRSV INCREASED AG IM: CPT | Performed by: FAMILY MEDICINE

## 2021-10-05 PROCEDURE — G0009 ADMIN PNEUMOCOCCAL VACCINE: HCPCS | Performed by: FAMILY MEDICINE

## 2021-10-05 PROCEDURE — 90732 PPSV23 VACC 2 YRS+ SUBQ/IM: CPT | Performed by: FAMILY MEDICINE

## 2021-10-05 NOTE — PROGRESS NOTES
"Gerri Castelan is a 65 y.o. female here for a non-provider visit for:   FLU  PNEUMOVAX (PPSV23)    Reason for immunization: Annual Flu Vaccine  Immunization records indicate need for vaccine: Yes, confirmed with Epic  Minimum interval has been met for this vaccine: Yes  ABN completed: Not Indicated    VIS Dated  08/06/21 was given to patient: Yes  All IAC Questionnaire questions were answered \"No.\"    Patient tolerated injection and no adverse effects were observed or reported: Yes    Pt scheduled for next dose in series: Not Indicated  "

## 2021-11-23 ENCOUNTER — OFFICE VISIT (OUTPATIENT)
Dept: MEDICAL GROUP | Facility: MEDICAL CENTER | Age: 66
End: 2021-11-23
Payer: MEDICARE

## 2021-11-23 VITALS
HEART RATE: 82 BPM | TEMPERATURE: 98.8 F | HEIGHT: 64 IN | RESPIRATION RATE: 14 BRPM | DIASTOLIC BLOOD PRESSURE: 56 MMHG | BODY MASS INDEX: 28.03 KG/M2 | OXYGEN SATURATION: 96 % | WEIGHT: 164.2 LBS | SYSTOLIC BLOOD PRESSURE: 94 MMHG

## 2021-11-23 DIAGNOSIS — E03.9 IDIOPATHIC HYPOTHYROIDISM: ICD-10-CM

## 2021-11-23 DIAGNOSIS — Z85.3 PERSONAL HISTORY OF BREAST CANCER: ICD-10-CM

## 2021-11-23 DIAGNOSIS — G89.29 CHRONIC RIGHT SHOULDER PAIN: ICD-10-CM

## 2021-11-23 DIAGNOSIS — E78.5 DYSLIPIDEMIA: ICD-10-CM

## 2021-11-23 DIAGNOSIS — R73.01 ELEVATED FASTING GLUCOSE: ICD-10-CM

## 2021-11-23 DIAGNOSIS — G43.709 CHRONIC MIGRAINE WITHOUT AURA WITHOUT STATUS MIGRAINOSUS, NOT INTRACTABLE: ICD-10-CM

## 2021-11-23 DIAGNOSIS — Z85.820 HISTORY OF MALIGNANT MELANOMA: ICD-10-CM

## 2021-11-23 DIAGNOSIS — D49.7 INCIDENTALLY-NOTED PITUITARY TUMOR: ICD-10-CM

## 2021-11-23 DIAGNOSIS — Z90.13 HISTORY OF BILATERAL MASTECTOMY: ICD-10-CM

## 2021-11-23 DIAGNOSIS — K64.1 GRADE II HEMORRHOIDS: ICD-10-CM

## 2021-11-23 DIAGNOSIS — K59.1 FUNCTIONAL DIARRHEA: ICD-10-CM

## 2021-11-23 DIAGNOSIS — D35.2 PROLACTINOMA (HCC): ICD-10-CM

## 2021-11-23 DIAGNOSIS — N95.1 MENOPAUSAL SYMPTOM: ICD-10-CM

## 2021-11-23 DIAGNOSIS — D35.2 PITUITARY ADENOMA WITH EXTRASELLAR EXTENSION (HCC): ICD-10-CM

## 2021-11-23 DIAGNOSIS — Z00.00 MEDICARE ANNUAL WELLNESS VISIT, SUBSEQUENT: ICD-10-CM

## 2021-11-23 DIAGNOSIS — M25.511 CHRONIC RIGHT SHOULDER PAIN: ICD-10-CM

## 2021-11-23 DIAGNOSIS — D35.2 PITUITARY MACROADENOMA (HCC): ICD-10-CM

## 2021-11-23 DIAGNOSIS — Z82.49 FAMILY HISTORY OF EARLY CAD: ICD-10-CM

## 2021-11-23 DIAGNOSIS — K21.9 GASTROESOPHAGEAL REFLUX DISEASE WITHOUT ESOPHAGITIS: ICD-10-CM

## 2021-11-23 PROBLEM — H60.391 ACUTE INFECTIVE OTITIS EXTERNA, RIGHT: Status: RESOLVED | Noted: 2019-05-07 | Resolved: 2021-11-23

## 2021-11-23 PROCEDURE — G0439 PPPS, SUBSEQ VISIT: HCPCS | Performed by: FAMILY MEDICINE

## 2021-11-23 RX ORDER — OMEPRAZOLE 40 MG/1
40 CAPSULE, DELAYED RELEASE ORAL DAILY
Qty: 90 CAPSULE | Refills: 3 | Status: SHIPPED | OUTPATIENT
Start: 2021-11-23 | End: 2022-07-29

## 2021-11-23 ASSESSMENT — ENCOUNTER SYMPTOMS: GENERAL WELL-BEING: GOOD

## 2021-11-23 ASSESSMENT — ACTIVITIES OF DAILY LIVING (ADL): BATHING_REQUIRES_ASSISTANCE: 0

## 2021-11-23 ASSESSMENT — PATIENT HEALTH QUESTIONNAIRE - PHQ9: CLINICAL INTERPRETATION OF PHQ2 SCORE: 0

## 2021-11-24 NOTE — PROGRESS NOTES
Chief Complaint   Patient presents with   • Annual Wellness Visit         HPI:  Gerri is a 66 y.o. here for Medicare Annual Wellness Visit      Patient Active Problem List    Diagnosis Date Noted   • History of malignant melanoma 03/09/2020   • History of left breast cancer 03/09/2020   • History of bilateral mastectomy 03/09/2020   • Grade II hemorrhoids 03/09/2020   • Menopausal symptom 03/09/2020   • Prolactinoma (HCC)    • Incidentally-noted pituitary tumor (HCC) 10/07/2018   • Pituitary adenoma with extrasellar extension (HCC) 09/24/2018   • Pituitary macroadenoma (HCC) 09/14/2018   • Elevated fasting glucose 12/30/2016   • Family history of early CAD 10/17/2016   • Dyslipidemia 09/26/2016   • Idiopathic hypothyroidism 09/26/2016   • Chronic right shoulder pain 09/22/2016   • Frequent loose stools 09/22/2016   • Personal history of breast cancer    • Family history of breast cancer in mother 12/01/2010   • GERD (gastroesophageal reflux disease) 05/19/2009       Current Outpatient Medications   Medication Sig Dispense Refill   • omeprazole (PRILOSEC) 40 MG delayed-release capsule Take 1 Capsule by mouth every day. 90 Capsule 3   • atorvastatin (LIPITOR) 20 MG Tab TAKE 1 TABLET DAILY AT BEDTIME 90 Tablet 3   • cholestyramine (QUESTRAN) 4 GM/DOSE powder Take 4 g by mouth every day. 378 g 2   • levothyroxine (SYNTHROID) 75 MCG Tab TAKE 1 TABLET BY MOUTH EVERY MORNING ON AN EMPTY STOMACH 90 tablet 3   • Ferrous Gluconate-C-Folic Acid (IRON-C PO) Take 25 mg by mouth every 72 hours.     • Cyanocobalamin (VITAMIN B-12) 5000 MCG SL Tab Place  under tongue.     • valacyclovir (VALTREX) 1 GM Tab Take 1 Tab by mouth 2 times a day. 20 Tab 3   • VITAMIN D PO Take 5,000 Units by mouth every day.     • bromocriptine (PARLODEL) 2.5 MG Tab TK 1 T PO BID  0   • Probiotic Product (PROBIOTIC DAILY PO) Take 1 Cap by mouth every day.       No current facility-administered medications for this visit.        Patient is taking  medications as noted in medication list.  Current supplements as per medication list.     Allergies: Amoxicillin and Arimidex [anastrozole]    Current social contact/activities: she is active with her  and friends     Is patient current with immunizations? No, due for TDAP. Patient is interested in receiving NONE today.    She  reports that she has never smoked. She has never used smokeless tobacco. She reports current alcohol use of about 0.6 oz of alcohol per week. She reports that she does not use drugs.  Counseling given: Yes        DPA/Advanced directive: Patient has Living Will, but it is not on file. Instructed to bring in a copy to scan into their chart.    ROS:    Gait: Uses no assistive device   Ostomy: No   Other tubes: No   Amputations: No   Chronic oxygen use No   Last eye exam 2020, will schedule soon  Wears hearing aids: No   : Denies any urinary leakage during the last 6 months    Screening:    Discussed     Depression Screening    Little interest or pleasure in doing things?  0 - not at all  Feeling down, depressed, or hopeless? 0 - not at all  Patient Health Questionnaire Score: 0    If depressive symptoms identified deferred to follow up visit unless specifically addressed in assessment and plan.    Interpretation of PHQ-9 Total Score   Score Severity   1-4 No Depression   5-9 Mild Depression   10-14 Moderate Depression   15-19 Moderately Severe Depression   20-27 Severe Depression    Screening for Cognitive Impairment    Three Minute Recall (captain, garden, picture)  3/3    Khoa clock face with all 12 numbers and set the hands to show 5 past 8.  Yes 5/5    If cognitive concerns identified, deferred for follow up unless specifically addressed in assessment and plan.    Fall Risk Assessment    Has the patient had two or more falls in the last year or any fall with injury in the last year?  No  If fall risk identified, deferred for follow up unless specifically addressed in assessment and  plan.    Safety Assessment    Throw rugs on floor.  No  Handrails on all stairs.  Yes  Good lighting in all hallways.  Yes  Difficulty hearing.  No  Patient counseled about all safety risks that were identified.    Functional Assessment ADLs    Are there any barriers preventing you from cooking for yourself or meeting nutritional needs?  No.    Are there any barriers preventing you from driving safely or obtaining transportation?  No.    Are there any barriers preventing you from using a telephone or calling for help?  No.    Are there any barriers preventing you from shopping?  No.    Are there any barriers preventing you from taking care of your own finances?  No.    Are there any barriers preventing you from managing your medications?  No.    Are there any barriers preventing you from showering, bathing or dressing yourself?  No.    Are you currently engaging in any exercise or physical activity?  Yes.  Walking, balance ball  What is your perception of your health?  Good.    Health Maintenance Summary          Overdue - COVID-19 Vaccine (3 - Booster for Moderna series) Overdue since 10/10/2021    04/10/2021  Imm Admin: Moderna SARS-CoV-2 Vaccine    03/12/2021  Imm Admin: Moderna SARS-CoV-2 Vaccine          Postponed - IMM DTaP/Tdap/Td Vaccine (2 - Td or Tdap) Postponed until 2/1/2022 08/02/2011  Imm Admin: Tdap Vaccine          BONE DENSITY (Every 5 Years) Tentatively due on 1/27/2025 01/27/2020  DS-BONE DENSITY STUDY (DEXA)    03/14/2012  DS-BONE DENSITY STUDY (DEXA)    05/19/2008  DS-BONE DENSITY STUDY (DEXA)          COLORECTAL CANCER SCREENING (COLONOSCOPY - Every 10 Years) Tentatively due on 2/24/2025 02/24/2015  AMB REFERRAL TO GI FOR COLONOSCOPY          IMM ZOSTER VACCINES (Series Information) Completed    11/19/2020  Imm Admin: Zoster Vaccine Recombinant (RZV) (SHINGRIX)    09/14/2020  Imm Admin: Zoster Vaccine Recombinant (RZV) (SHINGRIX)    10/15/2015  Imm Admin: Zoster Vaccine Live (ZVL)  (Zostavax) - HISTORICAL DATA          IMM INFLUENZA (Series Information) Completed    10/05/2021  Imm Admin: Influenza Vaccine Adult HD    09/14/2020  Imm Admin: Influenza (IM) Preservative Free - HISTORICAL DATA    10/30/2019  Imm Admin: Influenza Vaccine Quad Inj (Pf)    10/16/2018  Imm Admin: Influenza Vaccine Quad Inj (Pf)    10/13/2017  Imm Admin: Influenza Vaccine Quad Inj (Pf)    Only the first 5 history entries have been loaded, but more history exists.          IMM PNEUMOCOCCAL VACCINE: 65+ Years (Series Information) Completed    10/05/2021  Imm Admin: Pneumococcal polysaccharide vaccine (PPSV-23)    01/17/2020  Imm Admin: Pneumococcal Conjugate Vaccine (Prevnar/PCV-13)          Annual Wellness Visit  Completed    11/23/2021  Visit Dx: Medicare annual wellness visit, subsequent    03/09/2020  Visit Dx: Medicare annual wellness visit, subsequent          IMM HEP B VACCINE (Series Information) Aged Out    No completion history exists for this topic.          IMM MENINGOCOCCAL VACCINE (MCV4) (Series Information) Aged Out    No completion history exists for this topic.          Discontinued - MAMMOGRAM  Discontinued    09/20/2012  Previously completed - no, cannot do, has reconstruction, no breast tissue    01/03/2011  MA-DIAGNOSTIC DIGITAL MAMMO-UNILAT    12/01/2010  MA-DIAGNOSTIC DIGITAL MAMMO-UNILAT    11/15/2010  MA-SCREEING MAMMOGRAM W/ CAD    11/12/2009  MA-SCREENING DIGITAL MAMMO    Only the first 5 history entries have been loaded, but more history exists.          Discontinued - PAP SMEAR  Discontinued    04/13/2017  THINPREP PAP WITH HPV    04/13/2017  PATHOLOGY GYN SPECIMEN    03/22/2016  PATHOLOGY GYN SPECIMEN    01/12/2015  PATHOLOGY GYN SPECIMEN    11/05/2013  PAP IG (IMAGE GUIDED)    Only the first 5 history entries have been loaded, but more history exists.          Discontinued - HEPATITIS C SCREENING  Discontinued    No completion history exists for this topic.                Patient Care  Team:  Davidson Murillo M.D. as PCP - General  Reilly Watkins M.D. as Consulting Physician (Gastroenterology)  LUIS A Zimmerman M.D. as Consulting Physician (Medical Oncology)  Amos Flores M.D. (Endocrinology)  Mackenzie Loyd M.D. as Consulting Physician (Dermatology)    Social History     Tobacco Use   • Smoking status: Never Smoker   • Smokeless tobacco: Never Used   Vaping Use   • Vaping Use: Never used   Substance Use Topics   • Alcohol use: Yes     Alcohol/week: 0.6 oz     Types: 1 Standard drinks or equivalent per week     Comment: rarely   • Drug use: No     Family History   Problem Relation Age of Onset   • Cancer Mother 60        breast   • Hyperlipidemia Mother    • Dementia Mother    • Heart Attack Brother         MI at age 50   • Heart Attack Sister         MI at age 52   • Heart Attack Maternal Grandfather         MI ta ge 65   • Heart Disease Sister 52        Down syndrome with heart murmur   • Diabetes Brother 54     She  has a past medical history of Abnormal echocardiogram (11/17/2016), Cancer (HCC), Carcinoma of breast (HCC) (1/2011), Chronic migraine without aura without status migrainosus, not intractable, Disorder of thyroid, Dyslipidemia (10/2010), ESOPHAGITIS, Family history of breast cancer in first degree relative, Family history of early CAD (10/17/2016), gallbladder polyp (1/2011), GERD (gastroesophageal reflux disease), Gynecological disorder, Malignant melanoma (HCC) (12/1983), Migraine, Prolactinoma (HCC), S/P bilateral mastectomy (3/2011), and TIA (transient ischemic attack) (2016).   Past Surgical History:   Procedure Laterality Date   • ANAIS BY LAPAROSCOPY  12/6/2012    Performed by Yessica Ocampo M.D. at SURGERY [facility]   • NIPPLE RECONSTRUCTION  12/1/2011    Performed by PATEL DALTON at [facility] ORS   • FLAP GRAFT  12/1/2011    Performed by PATEL DALTON at [facility] ORS   • BREAST RECONSTRUCTION  7/15/2011    Performed by  "PATEL DALTON at SURGERY [facility]   • TISSUE EXPANDER PLACE/REMOVE  7/15/2011    Performed by PATEL DALTON at SURGERY [facility]   • BREAST IMPLANT REVISION  7/15/2011    Performed by PATEL DALTON at [facility]   • CAPSULECTOMY  7/15/2011    Performed by PATEL DALTON at [facility]   • TISSUE TRANSFER/REARRANGE  7/15/2011    Performed by PATEL DALTON at [facility]   • MASTECTOMY  3/29/2011    Performed by HANNAH LEVIN at SURGERY [facility] ORS   • BREAST RECONSTRUCTION  3/29/2011    Performed by PATEL DALTON at SURGERY [facility]   • TISSUE EXPANDER PLACE/REMOVE  3/29/2011    Performed by PATEL DALTON at SURGERY [facility]   • BREAST BIOPSY  2/24/2011    Performed by HANNAH LEVIN at SURGERY SAME DAY [facility] ORS   • LUMPECTOMY  1/19/2011    Performed by HANNAH LEVIN at SURGERY SAME DAY [facility] ORS   • NODE BIOPSY SENTINEL  1/19/2011    Performed by HANNAH LEVIN at SURGERY SAME DAY [facility] ORS   • COLONOSCOPY  2/2005    normal, due 2015   • OTHER ORTHOPEDIC SURGERY  1997    knee surgery ACL repair   • TUBAL LIGATION  1987   • OTHER  1983    malignant melanoma skin cancer upper back         Exam:     BP (!) 94/56 (BP Location: Right arm, Patient Position: Sitting, BP Cuff Size: Adult)   Pulse 82   Temp 37.1 °C (98.8 °F)   Resp 14   Ht 1.626 m (5' 4\")   Wt 74.5 kg (164 lb 3.2 oz)   SpO2 96%  Body mass index is 28.18 kg/m².    Hearing good.    Dentition not examined as patient, physician and staff all wearing masks  Alert, oriented in no acute distress.  Eye contact is good, speech goal directed, affect calm  HEENT:   EOMI, PERRLA.    No cervical adenopathy appreciated. No thyromegaly or neck masses appreciated.  No retractions appreciated.  Lungs:     Clear to auscultation A&P with good air movement.   Heart:      Regular rate and rhythm normal S1 and S2 without murmur appreciated.  Strong and symmetric " radial and DP pulses.  Abd:        Soft, bowel sounds positive, nontender. No bloating noted. No hepatosplenomegaly or mass appreciated. No pulsatile mass appreciated.  Ext:         Extremities show symmetric and full range of motion with normal strength. No cyanosis or clubbing appreciated. No peripheral edema appreciated.  Neuro:    Gait is normal. Patient is lucid, fluent and appropriate. No significant tremor appreciated.  Skin:       Exhibit no rashes, pigmented lesions or ulcerations.  Well healed large divot mid back from melanoma surgery.      Assessment and Plan. The following treatment and monitoring plan is recommended:      1. Medicare annual wellness visit, subsequent  Her medical problems are generally stable    2. Dyslipidemia/Family history of early CAD  Patient denies chest pain, chest pressure, palpitations or exertional shortness of breath. Patient denies muscle aches or muscle weakness from the atorvastatin medication. Patient is a never smoker. Patient takes no aspirin daily. Patient has no history of myocardial infarction, stroke or PVD.  Her sister had an MI at 52.  Father's history is not clearly known.  Follow-up lab orders discussed and placed.  The problem is clinically stable.    3. Gastroesophageal reflux disease without esophagitis  The patient feels the current medication regimen of omeprazole is controlling the gastroesophageal reflux symptoms well at least 80% of the time. Denies dysphagia, reflux symptoms, acidity, abdominal pain or visible blood or mucus in the stool. Denies vomiting or hematemesis. Denies burping or abdominal bloating. Patient avoids nonsteroidal anti-inflammatory drugs. Avoids heavy meals or eating within 2 hours of bedtime.  Follow-up lab orders discussed and placed.  The problem is stable.  - CBC WITHOUT DIFFERENTIAL; Future  - omeprazole (PRILOSEC) 40 MG delayed-release capsule; Take 1 Capsule by mouth every day.  Dispense: 90 Capsule; Refill: 3    4.  Functional diarrhea/Grade II hemorrhoids  Patient continues to have very irritating and often urgent functional diarrhea since her cholecystectomy.  This was in 2012 and the condition has not improved.  The cholestyramine continues to be somewhat helpful but not always helpful.  This does sometimes limit what she can do.  Denies any visible blood or mucus.  Denies increased abdominal pain.  The frequent urgent stool keeps her hemorrhoids quite irritated.  Stable problem.    5. Idiopathic hypothyroidism  Patient reports good energy level on the medication. Patient denies insomnia, tremor or change in appetite.  Patient is taking the medication on an empty stomach in the morning and waiting at least 30 minutes before eating.  Last TSH in January of this year was below target.  She is followed by endocrinology and medications have been adjusted.  She has follow-up labs already ordered by endocrinology.  Stable problem.    6. Elevated fasting glucose  Patient has history of mild elevated fasting glucose.  She has had mild glycohemoglobin elevation.  At this point she seems to have impaired glucose tolerance but the last test was over 2 years ago.  Follow-up lab orders discussed and placed.  Denies any increased nocturia or increased thirst.  Stable problem.  - HEMOGLOBIN A1C; Future  - Lipid Profile; Future  - Comp Metabolic Panel; Future    7. Chronic migraine without aura without status migrainosus, not intractable  Patient finds that her migraines are much less frequent and bothersome.  Still occur occasionally and respond mostly to sleep in a darkened room.  Stable problem.    8. Chronic right shoulder pain  Patient does have chronic pain of the right shoulder that is a source of stiffness and discomfort.  She feels currently it is under adequate control.  Stable problem.    9. Pituitary macroadenoma (HCC)/Pituitary adenoma with extrasellar extension (HCC)/Incidentally-noted pituitary tumor (HCC)/Prolactinoma  (HCC)  During an ER work-up for change in level of consciousness the pituitary adenoma was found.  This turned out to be a prolactinoma.  She was evaluated by a neurosurgeon initially however he recommended medical rather than surgical treatment.  She is treated with bromocriptine and has been very stable.  She continues to follow with endocrinology.  Stable problem.    10. History of malignant melanoma  Patient had malignant melanoma surgery in 1983.  This was in the mid upper back.  There is a very large area where the skin and underlying muscle layer was removed.  This is very well-healed and quiet.  She continues to follow with dermatology.  No new lesions or changes in moles have been noted.  Stable problem.    11. Personal history of breast cancer/History of bilateral mastectomy  Patient had breast cancer in 2011.  She had testing that confirmed her genetic predisposition.  Mother had had breast cancer.  She had bilateral mastectomy and breast reconstruction.  No recurrence has been noted.  Stable problem.    12. Menopausal symptom  Patient finds intimacy to be painful.  They are using Replens as a lubricant but it is not satisfactory.  Discussed other lubricants.  Patient is now 10 years out from her breast cancer.  We could consider topical estrogen cream if the new lubricants do not work.  Denies any bleeding or unusual discharge.  Stable problem.        Services suggested: No services needed at this time  Health Care Screening recommendations as per orders if indicated.  Referrals offered: PT/OT/Nutrition counseling/Behavioral Health/Smoking cessation as per orders if indicated.    Discussion today about general wellness and lifestyle habits:  discussed  · Prevent falls and reduce trip hazards; Cautioned about securing or removing rugs.  · Have a working fire alarm and carbon monoxide detector; has  · Engage in regular physical activity and social activities       Follow-up: Return in about 1 year (around  11/23/2022), or if symptoms worsen or fail to improve.

## 2021-12-14 DIAGNOSIS — N95.1 MENOPAUSAL SYMPTOMS: ICD-10-CM

## 2021-12-14 DIAGNOSIS — D35.2 PITUITARY ADENOMA WITH EXTRASELLAR EXTENSION (HCC): ICD-10-CM

## 2021-12-14 DIAGNOSIS — Z85.3 HISTORY OF LEFT BREAST CANCER: ICD-10-CM

## 2021-12-14 DIAGNOSIS — Z85.820 HISTORY OF MALIGNANT MELANOMA: ICD-10-CM

## 2021-12-14 RX ORDER — VENLAFAXINE HYDROCHLORIDE 37.5 MG/1
37.5 CAPSULE, EXTENDED RELEASE ORAL DAILY
Qty: 30 CAPSULE | Refills: 3 | Status: SHIPPED | OUTPATIENT
Start: 2021-12-14 | End: 2022-07-29

## 2021-12-21 ENCOUNTER — HOSPITAL ENCOUNTER (OUTPATIENT)
Dept: LAB | Facility: MEDICAL CENTER | Age: 66
End: 2021-12-21
Attending: NURSE PRACTITIONER
Payer: MEDICARE

## 2021-12-21 ENCOUNTER — HOSPITAL ENCOUNTER (OUTPATIENT)
Dept: LAB | Facility: MEDICAL CENTER | Age: 66
End: 2021-12-21
Attending: FAMILY MEDICINE
Payer: MEDICARE

## 2021-12-21 DIAGNOSIS — R73.01 ELEVATED FASTING GLUCOSE: ICD-10-CM

## 2021-12-21 DIAGNOSIS — E03.9 ACQUIRED HYPOTHYROIDISM: ICD-10-CM

## 2021-12-21 DIAGNOSIS — D35.2 PITUITARY MACROADENOMA (HCC): ICD-10-CM

## 2021-12-21 DIAGNOSIS — E78.5 DYSLIPIDEMIA: ICD-10-CM

## 2021-12-21 DIAGNOSIS — E03.9 IDIOPATHIC HYPOTHYROIDISM: ICD-10-CM

## 2021-12-21 DIAGNOSIS — K21.9 GASTROESOPHAGEAL REFLUX DISEASE WITHOUT ESOPHAGITIS: ICD-10-CM

## 2021-12-21 LAB
25(OH)D3 SERPL-MCNC: 44 NG/ML (ref 30–100)
ALBUMIN SERPL BCP-MCNC: 4.2 G/DL (ref 3.2–4.9)
ALBUMIN SERPL BCP-MCNC: 4.2 G/DL (ref 3.2–4.9)
ALBUMIN/GLOB SERPL: 1.9 G/DL
ALBUMIN/GLOB SERPL: 1.9 G/DL
ALP SERPL-CCNC: 85 U/L (ref 30–99)
ALP SERPL-CCNC: 85 U/L (ref 30–99)
ALT SERPL-CCNC: 18 U/L (ref 2–50)
ALT SERPL-CCNC: 18 U/L (ref 2–50)
ANION GAP SERPL CALC-SCNC: 11 MMOL/L (ref 7–16)
ANION GAP SERPL CALC-SCNC: 11 MMOL/L (ref 7–16)
AST SERPL-CCNC: 17 U/L (ref 12–45)
AST SERPL-CCNC: 18 U/L (ref 12–45)
BILIRUB SERPL-MCNC: 0.4 MG/DL (ref 0.1–1.5)
BILIRUB SERPL-MCNC: 0.4 MG/DL (ref 0.1–1.5)
BUN SERPL-MCNC: 25 MG/DL (ref 8–22)
BUN SERPL-MCNC: 25 MG/DL (ref 8–22)
CALCIUM SERPL-MCNC: 9.3 MG/DL (ref 8.5–10.5)
CALCIUM SERPL-MCNC: 9.3 MG/DL (ref 8.5–10.5)
CHLORIDE SERPL-SCNC: 108 MMOL/L (ref 96–112)
CHLORIDE SERPL-SCNC: 108 MMOL/L (ref 96–112)
CHOLEST SERPL-MCNC: 174 MG/DL (ref 100–199)
CO2 SERPL-SCNC: 22 MMOL/L (ref 20–33)
CO2 SERPL-SCNC: 23 MMOL/L (ref 20–33)
CREAT SERPL-MCNC: 0.57 MG/DL (ref 0.5–1.4)
CREAT SERPL-MCNC: 0.72 MG/DL (ref 0.5–1.4)
ERYTHROCYTE [DISTWIDTH] IN BLOOD BY AUTOMATED COUNT: 42.2 FL (ref 35.9–50)
EST. AVERAGE GLUCOSE BLD GHB EST-MCNC: 111 MG/DL
FASTING STATUS PATIENT QL REPORTED: NORMAL
GLOBULIN SER CALC-MCNC: 2.2 G/DL (ref 1.9–3.5)
GLOBULIN SER CALC-MCNC: 2.2 G/DL (ref 1.9–3.5)
GLUCOSE SERPL-MCNC: 101 MG/DL (ref 65–99)
GLUCOSE SERPL-MCNC: 102 MG/DL (ref 65–99)
HBA1C MFR BLD: 5.5 % (ref 4–5.6)
HCT VFR BLD AUTO: 37.4 % (ref 37–47)
HDLC SERPL-MCNC: 55 MG/DL
HGB BLD-MCNC: 12.5 G/DL (ref 12–16)
LDLC SERPL CALC-MCNC: 80 MG/DL
MCH RBC QN AUTO: 30.3 PG (ref 27–33)
MCHC RBC AUTO-ENTMCNC: 33.4 G/DL (ref 33.6–35)
MCV RBC AUTO: 90.8 FL (ref 81.4–97.8)
PLATELET # BLD AUTO: 199 K/UL (ref 164–446)
PMV BLD AUTO: 11.8 FL (ref 9–12.9)
POTASSIUM SERPL-SCNC: 4.3 MMOL/L (ref 3.6–5.5)
POTASSIUM SERPL-SCNC: 4.3 MMOL/L (ref 3.6–5.5)
PROLACTIN SERPL-MCNC: 2.1 NG/ML (ref 2.8–26)
PROT SERPL-MCNC: 6.4 G/DL (ref 6–8.2)
PROT SERPL-MCNC: 6.4 G/DL (ref 6–8.2)
RBC # BLD AUTO: 4.12 M/UL (ref 4.2–5.4)
SODIUM SERPL-SCNC: 141 MMOL/L (ref 135–145)
SODIUM SERPL-SCNC: 142 MMOL/L (ref 135–145)
T3 SERPL-MCNC: 112 NG/DL (ref 60–181)
T3FREE SERPL-MCNC: 3.18 PG/ML (ref 2–4.4)
T4 FREE SERPL-MCNC: 1.35 NG/DL (ref 0.93–1.7)
TRIGL SERPL-MCNC: 193 MG/DL (ref 0–149)
TSH SERPL DL<=0.005 MIU/L-ACNC: 0.7 UIU/ML (ref 0.38–5.33)
WBC # BLD AUTO: 4.5 K/UL (ref 4.8–10.8)

## 2021-12-21 PROCEDURE — 82306 VITAMIN D 25 HYDROXY: CPT | Mod: GA

## 2021-12-21 PROCEDURE — 80053 COMPREHEN METABOLIC PANEL: CPT

## 2021-12-21 PROCEDURE — 36415 COLL VENOUS BLD VENIPUNCTURE: CPT

## 2021-12-21 PROCEDURE — 84481 FREE ASSAY (FT-3): CPT

## 2021-12-21 PROCEDURE — 84480 ASSAY TRIIODOTHYRONINE (T3): CPT

## 2021-12-21 PROCEDURE — 84146 ASSAY OF PROLACTIN: CPT

## 2021-12-21 PROCEDURE — 80053 COMPREHEN METABOLIC PANEL: CPT | Mod: 91

## 2021-12-21 PROCEDURE — 82024 ASSAY OF ACTH: CPT

## 2021-12-21 PROCEDURE — 80061 LIPID PANEL: CPT

## 2021-12-21 PROCEDURE — 83036 HEMOGLOBIN GLYCOSYLATED A1C: CPT | Mod: GA

## 2021-12-21 PROCEDURE — 84443 ASSAY THYROID STIM HORMONE: CPT

## 2021-12-21 PROCEDURE — 84439 ASSAY OF FREE THYROXINE: CPT

## 2021-12-21 PROCEDURE — 85027 COMPLETE CBC AUTOMATED: CPT

## 2021-12-23 LAB — ACTH PLAS-MCNC: 25.5 PG/ML (ref 7.2–63.3)

## 2021-12-28 ENCOUNTER — TELEMEDICINE (OUTPATIENT)
Dept: ENDOCRINOLOGY | Facility: MEDICAL CENTER | Age: 66
End: 2021-12-28
Attending: NURSE PRACTITIONER
Payer: MEDICARE

## 2021-12-28 DIAGNOSIS — E03.9 IDIOPATHIC HYPOTHYROIDISM: ICD-10-CM

## 2021-12-28 DIAGNOSIS — E53.8 VITAMIN B 12 DEFICIENCY: ICD-10-CM

## 2021-12-28 DIAGNOSIS — N95.1 HOT FLASHES DUE TO MENOPAUSE: ICD-10-CM

## 2021-12-28 DIAGNOSIS — D35.2 PITUITARY MACROADENOMA (HCC): ICD-10-CM

## 2021-12-28 DIAGNOSIS — E55.9 VITAMIN D DEFICIENCY: ICD-10-CM

## 2021-12-28 PROCEDURE — 99214 OFFICE O/P EST MOD 30 MIN: CPT | Mod: 95 | Performed by: NURSE PRACTITIONER

## 2021-12-28 RX ORDER — CLINDAMYCIN HYDROCHLORIDE 300 MG/1
CAPSULE ORAL
COMMUNITY
Start: 2021-12-09 | End: 2022-07-29

## 2021-12-28 RX ORDER — CHLORHEXIDINE GLUCONATE ORAL RINSE 1.2 MG/ML
SOLUTION DENTAL
COMMUNITY
Start: 2021-12-09 | End: 2022-07-29

## 2021-12-28 RX ORDER — HYDROCODONE BITARTRATE AND ACETAMINOPHEN 5; 325 MG/1; MG/1
TABLET ORAL
COMMUNITY
Start: 2021-12-09 | End: 2022-07-29

## 2021-12-28 NOTE — PROGRESS NOTES
Chief Complaint: Follow up for Prolactinoma, Primary Hypothyroidism, Vitamin D Deficiency & Vitamin B Deficiency.   Patient was presented for a telehealth consultation via secure and encrypted videoconferencing technology. This encounter was conducted via Zoom . Verbal consent was obtained. Patient's identity was verified.    HPI:     Gerri Castelan is a 65 y.o. female for continued evaluation & treatment of the followin. Prolactinoma   Patient reports she is doing well since her last appointment.  No new issues or symptoms that she is concerned about.  Energy level remains stable and patient continues to deny galactorrhea, headaches or blurred vision.    Currently on Bromocriptine 2.5 mg bid.     History of elevated prolactin level since approximately 2018.    MRI 2021:  IMPRESSION: 1.  Further interval decrease in size of the sellar mass. 2.  MRI of the brain otherwise unremarkable for age with mild atrophy and mild white matter changes       Ref. Range 2021 07:17   Prolactin Latest Ref Range: 2.80 - 26.00 ng/mL 2.10 (L)         2.  Primary Hypothyroidism  Currently on Levothyroxine 75 mcg daily, which she takes on an empty stomach before breakfast. This has been her dosage for over 1 year.   Pt reports adequate compliance and denies missing any daily doses.    Pt taking any iron, calcium supplements or antacids.      Weight-no weight change.    Denies constipation, cold intolerance, heart palpitations & tremors.        Ref. Range 2021 07:17   TSH Latest Ref Range: 0.380 - 5.330 uIU/mL 0.700   Free T-4 Latest Ref Range: 0.93 - 1.70 ng/dL 1.35   T3 Latest Ref Range: 60.0 - 181.0 ng/dL 112.0   T3,Free Latest Ref Range: 2.00 - 4.40 pg/mL 3.18         3. Vitamin D Deficiency  Currently taking 5000 iu of Vitamin D daily.      Ref. Range 2021 11:23   25-Hydroxy   Vitamin D 25 Latest Ref Range: 30 - 100 ng/mL 64         4. Vitamin B Deficiency   Currently taking Vitamin B12  5000mcg SL Daily and Biotin 1 pill daily.     Ref. Range 1/21/2021 11:23   Vitamin B12 -True Cobalamin Latest Ref Range: 211 - 911 pg/mL 1859 (H)       Patient has ongoing concerns about postmenopausal symptoms including hot flashes, insomnia and moodiness.  Patient is unable to take hormone replacement therapy secondary to a history of breast cancer.  In the past patient has been prescribed Effexor by her primary care doctor but she is resistant to start this antidepressant therapy as she does not want to rely on this form of pharmaceutical.      Patient's medications, allergies, and social histories were reviewed and updated as appropriate.      ROS:     CONS:     No fever, no chills   EYES:     No diplopia, no blurry vision   CV:           No chest pain, no palpitations   PULM:     No SOB, no cough, no hemoptysis.   GI:            No nausea, no vomiting, no diarrhea, no constipation   ENDO:     No polyuria, no polydipsia, no heat intolerance, no cold intolerance       Past Medical History:  Problem List:  2020-03: History of malignant melanoma  2020-03: History of left breast cancer  2020-03: History of bilateral mastectomy  2020-03: Grade II hemorrhoids  2020-03: Menopausal symptom  2019-05: Acute infective otitis externa, right  2018-10: Incidentally-noted pituitary tumor (HCC)  2018-09: Pituitary adenoma with extrasellar extension (HCC)  2018-09: Pituitary macroadenoma (HCC)  2018-09: Amnesia  2018-09: Hypothyroidism  2016-12: Elevated fasting glucose  2016-11: Abnormal echocardiogram  2016-10: Family history of early CAD  2016-10: History of TIA (transient ischemic attack)  2016-09: Dyslipidemia  2016-09: Idiopathic hypothyroidism  2016-09: Altered mental status  2016-09: Chronic right shoulder pain  2016-09: Frequent loose stools  2016-03: Elevated TSH  2012-12: Enlarged gallbladder  2012-11: Gallbladder polyp  2011-02: Melanoma (HCC)  2010-12: Family history of breast cancer in mother  2009-05: GERD  (gastroesophageal reflux disease)  Personal history of breast cancer  Chronic migraine without aura without status migrainosus, not   intractable  Prolactinoma (HCC)      Past Surgical History:  Past Surgical History:   Procedure Laterality Date   • ANAIS BY LAPAROSCOPY  12/6/2012    Performed by Yessica Levin M.D. at SURGERY [facility]   • NIPPLE RECONSTRUCTION  12/1/2011    Performed by PATEL DALTON at [facility]   • FLAP GRAFT  12/1/2011    Performed by PATEL DALTON at SURGERY [facility]   • BREAST RECONSTRUCTION  7/15/2011    Performed by PATEL DALTON at [facility]   • TISSUE EXPANDER PLACE/REMOVE  7/15/2011    Performed by PATEL DALTON at [facility]   • BREAST IMPLANT REVISION  7/15/2011    Performed by PATEL DALTON at [facility]   • CAPSULECTOMY  7/15/2011    Performed by PATEL DALTON at [facility]   • TISSUE TRANSFER/REARRANGE  7/15/2011    Performed by PATEL DALTON at SURGERY [facility]   • MASTECTOMY  3/29/2011    Performed by YESSICA LEVIN at SURGERY [facility]   • BREAST RECONSTRUCTION  3/29/2011    Performed by PATEL DALTON at SURGERY [facility]   • TISSUE EXPANDER PLACE/REMOVE  3/29/2011    Performed by PATEL DALTON at SURGERY [facility]   • BREAST BIOPSY  2/24/2011    Performed by YESSICA LEVIN at SURGERY SAME DAY [facility]   • LUMPECTOMY  1/19/2011    Performed by YESSICA LEVIN at SURGERY SAME DAY [facility]   • NODE BIOPSY SENTINEL  1/19/2011    Performed by YESSICA LEVIN at SURGERY SAME DAY [facility]   • COLONOSCOPY  2/2005    normal, due 2015   • OTHER ORTHOPEDIC SURGERY  1997    knee surgery ACL repair   • TUBAL LIGATION  1987   • OTHER  1983    malignant melanoma skin cancer upper back        Allergies:  Amoxicillin and Arimidex [anastrozole]     Social History:  Social History     Tobacco Use   • Smoking status: Never Smoker   • Smokeless  tobacco: Never Used   Vaping Use   • Vaping Use: Never used   Substance Use Topics   • Alcohol use: Yes     Alcohol/week: 0.6 oz     Types: 1 Standard drinks or equivalent per week     Comment: rarely   • Drug use: No        Family History:   family history includes Cancer (age of onset: 60) in her mother; Dementia in her mother; Diabetes (age of onset: 54) in her brother; Heart Attack in her brother, maternal grandfather, and sister; Heart Disease (age of onset: 52) in her sister; Hyperlipidemia in her mother.      PHYSICAL EXAM:   Vital signs: Virtual Visit  GENERAL: Well-developed, well-nourished in no apparent distress.   EYE:  No ocular asymmetry, PERRLA  HENT: Pink, moist mucous membranes.    NECK: No thyromegaly.   CARDIOVASCULAR:  No murmurs  LUNGS: Clear breath sounds  ABDOMEN: Soft, nontender   EXTREMITIES: No clubbing, cyanosis, or edema.   NEUROLOGICAL: No gross focal motor abnormalities   LYMPH: No cervical adenopathy seen.   SKIN: No rashes, lesions.       ASSESSMENT/PLAN:     1. Prolactinoma, benign (HCC)  Stable.  Continue Bromocriptine 2.5mg BID.   If the prolactin continues to stay in this range for good 2 years then we will plan to stop bromocriptine and see how she does. Approximately October 2022.    2. Acquired hypothyroidism  Stable.  Continue Levothyroxine 75 mcg daily    3. Vitamin D deficiency  Stable.  Currently taking 5000 iu of Vitamin D daily.    4. Vitamin B 12 deficiency  Stable.  Currently taking Vitamin B12 5000mcg SL Daily and Biotin 1 pill daily.    5.  Hot flashes due to menopause  Unstable.  Recommend patient start Effexor therapy, low dose, to see if this assists with the suppression of her menopause related symptoms.  Detailed discussion with patient that medication should not be discontinued abruptly but should be titrated down slowly with her primary care overseeing this process.    Repeat labs 1 week prior to follow up appointment.   Follow up appointment in 6 months.        Thank you kindly for allowing me to participate in the thyroid care plan for this patient.    Desire Carpenter, APRN  12/28/2021    CC:   Davidson Murillo M.D.

## 2022-01-19 DIAGNOSIS — E03.8 OTHER SPECIFIED HYPOTHYROIDISM: ICD-10-CM

## 2022-01-20 RX ORDER — LEVOTHYROXINE SODIUM 0.07 MG/1
TABLET ORAL
Qty: 90 TABLET | Refills: 3 | Status: SHIPPED | OUTPATIENT
Start: 2022-01-20 | End: 2022-11-30 | Stop reason: SDUPTHER

## 2022-02-22 ENCOUNTER — HOSPITAL ENCOUNTER (OUTPATIENT)
Dept: LAB | Facility: MEDICAL CENTER | Age: 67
End: 2022-02-22
Attending: FAMILY MEDICINE
Payer: MEDICARE

## 2022-02-22 PROCEDURE — 36415 COLL VENOUS BLD VENIPUNCTURE: CPT

## 2022-02-22 PROCEDURE — 82784 ASSAY IGA/IGD/IGG/IGM EACH: CPT

## 2022-02-22 PROCEDURE — 86364 TISS TRNSGLTMNASE EA IG CLAS: CPT

## 2022-02-24 LAB
IGA SERPL-MCNC: 58 MG/DL (ref 68–408)
TTG IGA SER IA-ACNC: <2 U/ML (ref 0–3)

## 2022-02-28 ENCOUNTER — HOSPITAL ENCOUNTER (OUTPATIENT)
Facility: MEDICAL CENTER | Age: 67
End: 2022-02-28
Attending: INTERNAL MEDICINE
Payer: MEDICARE

## 2022-02-28 LAB
C DIFF DNA SPEC QL NAA+PROBE: NEGATIVE
C DIFF TOX A+B STL QL IA: NEGATIVE
C DIFF TOX GENS STL QL NAA+PROBE: NORMAL
G LAMBLIA+C PARVUM AG STL QL RAPID: NORMAL
SIGNIFICANT IND 70042: NORMAL
SITE SITE: NORMAL
SOURCE SOURCE: NORMAL

## 2022-02-28 PROCEDURE — 87329 GIARDIA AG IA: CPT

## 2022-02-28 PROCEDURE — 83993 ASSAY FOR CALPROTECTIN FECAL: CPT

## 2022-02-28 PROCEDURE — 83630 LACTOFERRIN FECAL (QUAL): CPT

## 2022-02-28 PROCEDURE — 82653 EL-1 FECAL QUANTITATIVE: CPT

## 2022-02-28 PROCEDURE — 87493 C DIFF AMPLIFIED PROBE: CPT

## 2022-02-28 PROCEDURE — 87324 CLOSTRIDIUM AG IA: CPT | Mod: XU

## 2022-02-28 PROCEDURE — 87328 CRYPTOSPORIDIUM AG IA: CPT

## 2022-03-03 LAB
CALPROTECTIN STL-MCNT: 78 UG/G
ELASTASE PANC STL-MCNT: >800 UG/G
LACTOFERRIN STL QL IA: NEGATIVE

## 2022-03-29 DIAGNOSIS — E03.9 IDIOPATHIC HYPOTHYROIDISM: ICD-10-CM

## 2022-03-29 DIAGNOSIS — D35.2 PITUITARY ADENOMA WITH EXTRASELLAR EXTENSION (HCC): ICD-10-CM

## 2022-04-27 ENCOUNTER — APPOINTMENT (RX ONLY)
Dept: URBAN - METROPOLITAN AREA CLINIC 22 | Facility: CLINIC | Age: 67
Setting detail: DERMATOLOGY
End: 2022-04-27

## 2022-04-27 DIAGNOSIS — Z85.820 PERSONAL HISTORY OF MALIGNANT MELANOMA OF SKIN: ICD-10-CM

## 2022-04-27 DIAGNOSIS — Z85.828 PERSONAL HISTORY OF OTHER MALIGNANT NEOPLASM OF SKIN: ICD-10-CM

## 2022-04-27 DIAGNOSIS — L82.1 OTHER SEBORRHEIC KERATOSIS: ICD-10-CM

## 2022-04-27 DIAGNOSIS — D18.0 HEMANGIOMA: ICD-10-CM

## 2022-04-27 DIAGNOSIS — L81.4 OTHER MELANIN HYPERPIGMENTATION: ICD-10-CM

## 2022-04-27 DIAGNOSIS — D22 MELANOCYTIC NEVI: ICD-10-CM

## 2022-04-27 PROBLEM — D18.01 HEMANGIOMA OF SKIN AND SUBCUTANEOUS TISSUE: Status: ACTIVE | Noted: 2022-04-27

## 2022-04-27 PROBLEM — D22.5 MELANOCYTIC NEVI OF TRUNK: Status: ACTIVE | Noted: 2022-04-27

## 2022-04-27 PROCEDURE — 99213 OFFICE O/P EST LOW 20 MIN: CPT

## 2022-04-27 PROCEDURE — ? SUNSCREEN TREATMENT REGIMEN

## 2022-04-27 PROCEDURE — ? COUNSELING

## 2022-04-27 ASSESSMENT — LOCATION DETAILED DESCRIPTION DERM
LOCATION DETAILED: LEFT MID-UPPER BACK
LOCATION DETAILED: INFERIOR MID FOREHEAD
LOCATION DETAILED: LEFT INFERIOR CENTRAL MALAR CHEEK
LOCATION DETAILED: EPIGASTRIC SKIN
LOCATION DETAILED: RIGHT VENTRAL PROXIMAL FOREARM
LOCATION DETAILED: LEFT VENTRAL PROXIMAL FOREARM
LOCATION DETAILED: INFERIOR THORACIC SPINE
LOCATION DETAILED: LEFT SUPERIOR FLANK
LOCATION DETAILED: RIGHT POSTERIOR SHOULDER
LOCATION DETAILED: RIGHT SUPERIOR MEDIAL UPPER BACK

## 2022-04-27 ASSESSMENT — LOCATION SIMPLE DESCRIPTION DERM
LOCATION SIMPLE: INFERIOR FOREHEAD
LOCATION SIMPLE: LEFT UPPER BACK
LOCATION SIMPLE: RIGHT UPPER BACK
LOCATION SIMPLE: LEFT FOREARM
LOCATION SIMPLE: ABDOMEN
LOCATION SIMPLE: RIGHT SHOULDER
LOCATION SIMPLE: RIGHT FOREARM
LOCATION SIMPLE: LEFT CHEEK
LOCATION SIMPLE: UPPER BACK

## 2022-04-27 ASSESSMENT — LOCATION ZONE DERM
LOCATION ZONE: FACE
LOCATION ZONE: TRUNK
LOCATION ZONE: ARM

## 2022-06-22 ENCOUNTER — OFFICE VISIT (OUTPATIENT)
Dept: MEDICAL GROUP | Facility: MEDICAL CENTER | Age: 67
End: 2022-06-22
Payer: MEDICARE

## 2022-06-22 ENCOUNTER — HOSPITAL ENCOUNTER (OUTPATIENT)
Facility: MEDICAL CENTER | Age: 67
End: 2022-06-22
Attending: FAMILY MEDICINE
Payer: MEDICARE

## 2022-06-22 VITALS
BODY MASS INDEX: 27.76 KG/M2 | DIASTOLIC BLOOD PRESSURE: 64 MMHG | HEART RATE: 74 BPM | SYSTOLIC BLOOD PRESSURE: 110 MMHG | WEIGHT: 162.6 LBS | HEIGHT: 64 IN | OXYGEN SATURATION: 95 % | TEMPERATURE: 98.2 F

## 2022-06-22 DIAGNOSIS — J11.1 INFLUENZA-LIKE ILLNESS: ICD-10-CM

## 2022-06-22 LAB
EXTERNAL QUALITY CONTROL: NORMAL
FLUAV+FLUBV AG SPEC QL IA: NEGATIVE
INT CON NEG: NEGATIVE
INT CON NEG: NEGATIVE
INT CON POS: POSITIVE
INT CON POS: POSITIVE
S PYO AG THROAT QL: NEGATIVE
SARS-COV+SARS-COV-2 AG RESP QL IA.RAPID: NEGATIVE

## 2022-06-22 PROCEDURE — 87804 INFLUENZA ASSAY W/OPTIC: CPT | Performed by: FAMILY MEDICINE

## 2022-06-22 PROCEDURE — 0240U HCHG SARS-COV-2 COVID-19 NFCT DS RESP RNA 3 TRGT MIC: CPT

## 2022-06-22 PROCEDURE — 87426 SARSCOV CORONAVIRUS AG IA: CPT | Performed by: FAMILY MEDICINE

## 2022-06-22 PROCEDURE — 87880 STREP A ASSAY W/OPTIC: CPT | Performed by: FAMILY MEDICINE

## 2022-06-22 PROCEDURE — 99213 OFFICE O/P EST LOW 20 MIN: CPT | Mod: CS | Performed by: FAMILY MEDICINE

## 2022-06-22 RX ORDER — ALBUTEROL SULFATE 90 UG/1
2 AEROSOL, METERED RESPIRATORY (INHALATION) EVERY 4 HOURS PRN
Qty: 1 EACH | Refills: 0 | Status: SHIPPED | OUTPATIENT
Start: 2022-06-22 | End: 2022-07-05

## 2022-06-22 RX ORDER — AZITHROMYCIN 250 MG/1
TABLET, FILM COATED ORAL
Qty: 6 TABLET | Refills: 0 | Status: SHIPPED | OUTPATIENT
Start: 2022-06-22 | End: 2022-07-29

## 2022-06-22 RX ORDER — PANTOPRAZOLE SODIUM 40 MG/1
TABLET, DELAYED RELEASE ORAL
COMMUNITY
Start: 2022-05-03 | End: 2023-06-21

## 2022-06-22 ASSESSMENT — PATIENT HEALTH QUESTIONNAIRE - PHQ9: CLINICAL INTERPRETATION OF PHQ2 SCORE: 0

## 2022-06-22 ASSESSMENT — FIBROSIS 4 INDEX: FIB4 SCORE: 1.43

## 2022-06-22 NOTE — PROGRESS NOTES
"Subjective:     CC: \"feeling ill\"    HPI:   Gerri presents today with:    Symptoms started Saturday, friend with recent positive. Her at home test on Saturday was negative. Symptoms include: coughing, raspy chest, phlegm production, TEIXEIRA.  Endorses: occasional sore throat, diarrhea  Denies: Fevers, chills, N/V, loss of smell or taste, headaches, dysuria    Patient has benign prolactin producing pituitary tumor that is treated with bromocriptine. No history of chronic lung disease, does have history of significant second hand smoke exposure. Does get TEIXEIRA at times at baseline.    Amoxicillin causes headaches for her. Has tolerated Azithromycin and Clindamycin in past.    No problems updated.    Current Outpatient Medications Ordered in Epic   Medication Sig Dispense Refill   • pantoprazole (PROTONIX) 40 MG Tablet Delayed Response TAKE 1 TABLET BY MOUTH EVERY DAY 30 MINUTES BEFORE BREAKFAST MEAL     • albuterol 108 (90 Base) MCG/ACT Aero Soln inhalation aerosol Inhale 2 Puffs every four hours as needed for Shortness of Breath. 1 Each 0   • azithromycin (ZITHROMAX) 250 MG Tab Take 2 tablets on first day and then 1 tablet daily for 4 more days 6 Tablet 0   • levothyroxine (SYNTHROID) 75 MCG Tab TAKE 1 TABLET EVERY MORNING ON AN EMPTY STOMACH 90 Tablet 3   • chlorhexidine (PERIDEX) 0.12 % Solution RINSE MOUTH WITH 15 ML FOR 30 SECONDS AND EXPECTORATE TWICE DAILY EVERY MORNING AND EVERY EVENING AFTER TOOTHBRUSHING     • clindamycin (CLEOCIN) 300 MG Cap TAKE 1 CAPSULE BY MOUTH FOUR TIMES DAILY UNTIL GONE     • HYDROcodone-acetaminophen (NORCO) 5-325 MG Tab per tablet TAKE 1 TABLET BY MOUTH EVERY 4 TO 6 HOURS AS NEEDED FOR PAIN     • venlafaxine XR (EFFEXOR XR) 37.5 MG CAPSULE SR 24 HR Take 1 Capsule by mouth every day. 30 Capsule 3   • omeprazole (PRILOSEC) 40 MG delayed-release capsule Take 1 Capsule by mouth every day. 90 Capsule 3   • atorvastatin (LIPITOR) 20 MG Tab TAKE 1 TABLET DAILY AT BEDTIME 90 Tablet 3   • " "cholestyramine (QUESTRAN) 4 GM/DOSE powder Take 4 g by mouth every day. 378 g 2   • Ferrous Gluconate-C-Folic Acid (IRON-C PO) Take 25 mg by mouth every 72 hours.     • Cyanocobalamin (VITAMIN B-12) 5000 MCG SL Tab Place  under tongue.     • valacyclovir (VALTREX) 1 GM Tab Take 1 Tab by mouth 2 times a day. 20 Tab 3   • VITAMIN D PO Take 5,000 Units by mouth every day.     • bromocriptine (PARLODEL) 2.5 MG Tab TK 1 T PO BID  0   • Probiotic Product (PROBIOTIC DAILY PO) Take 1 Cap by mouth every day.       No current [facility]-ordered facility-administered medications on file.       Health Maintenance: Acute visit    ROS:  ROS see HPI    Objective:     Exam:  /64   Pulse 74   Temp 36.8 °C (98.2 °F) (Temporal)   Ht 1.626 m (5' 4\")   Wt 73.8 kg (162 lb 9.6 oz)   LMP 10/30/2004   SpO2 95%   BMI 27.91 kg/m²  Body mass index is 27.91 kg/m².    Physical Exam  Vitals reviewed.   Constitutional:       General: She is not in acute distress.     Appearance: Normal appearance.   HENT:      Head: Normocephalic and atraumatic.      Right Ear: Tympanic membrane normal.      Left Ear: Tympanic membrane normal.      Mouth/Throat:      Mouth: Mucous membranes are moist.      Pharynx: Posterior oropharyngeal erythema present. No oropharyngeal exudate.   Cardiovascular:      Rate and Rhythm: Normal rate and regular rhythm.      Heart sounds: Normal heart sounds.   Pulmonary:      Effort: Pulmonary effort is normal. No respiratory distress.      Breath sounds: Normal breath sounds. No wheezing.      Comments: Cough during exam  Musculoskeletal:      Cervical back: No tenderness.   Lymphadenopathy:      Cervical: No cervical adenopathy.   Neurological:      Mental Status: She is alert. Mental status is at baseline.   Psychiatric:         Mood and Affect: Mood normal.         Behavior: Behavior normal.       Labs:   Point-of-care COVID, flu, strep negative    Assessment & Plan:     66 y.o. female with the following -     Problem " List Items Addressed This Visit    None     Visit Diagnoses     Influenza-like illness      Point-of-care testing negative will send for confirmatory test as patient has had COVID exposure and wants to make sure she does not spread around.  Discussed that most likely viral and supportive care as she is doing well be most influential in getting her well.  No concerning symptoms of severe illness at this time.  I did provide prescription for albuterol inhaler and azithromycin that she can .  Discussed if she starts azithromycin to take it to completion.    Relevant Orders    POCT SARS-COV Antigen JANIS (Symptomatic only)    POCT Influenza A/B    POCT Rapid Strep A            Return if symptoms worsen or fail to improve.    Please note that this dictation was created using voice recognition software. I have made every reasonable attempt to correct obvious errors, but I expect that there are errors of grammar and possibly content that I did not discover before finalizing the note.

## 2022-06-22 NOTE — PATIENT INSTRUCTIONS
My recommendations for an upper respiratory infection:  - Use a humidifier, especially at night. Cold or warm water humidifiers have the same effect.  - Rohith Med squeeze bottle sinus rinses twice a day.  - Hot tea + honey + fresh lemon juice  - Throat Coat herbal tea  - Honey by itself has been shown to help fight bugs and provide cough relief  - Chicken noodle soup has also been shown to help fight bugs  - Drink plenty of water  - Vitamin C supplementation   - Tylenol (no more than 3,000 mg in a day) as needed  - Ibuprofen (no more than 2,400 mg in a day) as needed

## 2022-06-23 DIAGNOSIS — J11.1 INFLUENZA-LIKE ILLNESS: ICD-10-CM

## 2022-06-23 LAB
FLUAV RNA SPEC QL NAA+PROBE: NEGATIVE
FLUBV RNA SPEC QL NAA+PROBE: NEGATIVE
SARS-COV-2 RNA RESP QL NAA+PROBE: NOTDETECTED
SPECIMEN SOURCE: NORMAL

## 2022-06-29 DIAGNOSIS — R05.3 PERSISTENT COUGH: ICD-10-CM

## 2022-07-01 ENCOUNTER — HOSPITAL ENCOUNTER (OUTPATIENT)
Dept: RADIOLOGY | Facility: MEDICAL CENTER | Age: 67
End: 2022-07-01
Attending: FAMILY MEDICINE
Payer: MEDICARE

## 2022-07-01 DIAGNOSIS — R05.3 PERSISTENT COUGH: ICD-10-CM

## 2022-07-01 PROCEDURE — 71046 X-RAY EXAM CHEST 2 VIEWS: CPT

## 2022-07-05 RX ORDER — ALBUTEROL SULFATE 90 UG/1
AEROSOL, METERED RESPIRATORY (INHALATION)
Qty: 8.5 G | Refills: 3 | Status: SHIPPED | OUTPATIENT
Start: 2022-07-05 | End: 2022-07-29

## 2022-07-20 ENCOUNTER — HOSPITAL ENCOUNTER (OUTPATIENT)
Dept: LAB | Facility: MEDICAL CENTER | Age: 67
End: 2022-07-20
Attending: NURSE PRACTITIONER
Payer: MEDICARE

## 2022-07-20 DIAGNOSIS — D35.2 PITUITARY MACROADENOMA (HCC): ICD-10-CM

## 2022-07-20 DIAGNOSIS — E03.9 IDIOPATHIC HYPOTHYROIDISM: ICD-10-CM

## 2022-07-20 LAB
ALBUMIN SERPL BCP-MCNC: 4.4 G/DL (ref 3.2–4.9)
ALBUMIN/GLOB SERPL: 2.1 G/DL
ALP SERPL-CCNC: 79 U/L (ref 30–99)
ALT SERPL-CCNC: 17 U/L (ref 2–50)
ANION GAP SERPL CALC-SCNC: 10 MMOL/L (ref 7–16)
AST SERPL-CCNC: 20 U/L (ref 12–45)
BILIRUB SERPL-MCNC: 0.6 MG/DL (ref 0.1–1.5)
BUN SERPL-MCNC: 20 MG/DL (ref 8–22)
CALCIUM SERPL-MCNC: 9.8 MG/DL (ref 8.5–10.5)
CHLORIDE SERPL-SCNC: 106 MMOL/L (ref 96–112)
CO2 SERPL-SCNC: 24 MMOL/L (ref 20–33)
CREAT SERPL-MCNC: 0.8 MG/DL (ref 0.5–1.4)
GFR SERPLBLD CREATININE-BSD FMLA CKD-EPI: 80 ML/MIN/1.73 M 2
GLOBULIN SER CALC-MCNC: 2.1 G/DL (ref 1.9–3.5)
GLUCOSE SERPL-MCNC: 105 MG/DL (ref 65–99)
POTASSIUM SERPL-SCNC: 4.6 MMOL/L (ref 3.6–5.5)
PROLACTIN SERPL-MCNC: 1.58 NG/ML (ref 2.8–26)
PROT SERPL-MCNC: 6.5 G/DL (ref 6–8.2)
SODIUM SERPL-SCNC: 140 MMOL/L (ref 135–145)
T3FREE SERPL-MCNC: 2.68 PG/ML (ref 2–4.4)
T4 FREE SERPL-MCNC: 1.36 NG/DL (ref 0.93–1.7)
TSH SERPL DL<=0.005 MIU/L-ACNC: 0.5 UIU/ML (ref 0.38–5.33)

## 2022-07-20 PROCEDURE — 84439 ASSAY OF FREE THYROXINE: CPT

## 2022-07-20 PROCEDURE — 84481 FREE ASSAY (FT-3): CPT

## 2022-07-20 PROCEDURE — 84443 ASSAY THYROID STIM HORMONE: CPT

## 2022-07-20 PROCEDURE — 80053 COMPREHEN METABOLIC PANEL: CPT

## 2022-07-20 PROCEDURE — 84146 ASSAY OF PROLACTIN: CPT

## 2022-07-20 PROCEDURE — 36415 COLL VENOUS BLD VENIPUNCTURE: CPT

## 2022-07-29 ENCOUNTER — OFFICE VISIT (OUTPATIENT)
Dept: ENDOCRINOLOGY | Facility: MEDICAL CENTER | Age: 67
End: 2022-07-29
Attending: INTERNAL MEDICINE
Payer: MEDICARE

## 2022-07-29 VITALS
OXYGEN SATURATION: 96 % | HEIGHT: 64 IN | WEIGHT: 165 LBS | HEART RATE: 84 BPM | BODY MASS INDEX: 28.17 KG/M2 | SYSTOLIC BLOOD PRESSURE: 106 MMHG | DIASTOLIC BLOOD PRESSURE: 68 MMHG

## 2022-07-29 DIAGNOSIS — E22.1 HYPERPROLACTINEMIA (HCC): ICD-10-CM

## 2022-07-29 DIAGNOSIS — E03.9 PRIMARY HYPOTHYROIDISM: ICD-10-CM

## 2022-07-29 DIAGNOSIS — D35.2 PITUITARY MACROADENOMA (HCC): ICD-10-CM

## 2022-07-29 DIAGNOSIS — E55.9 VITAMIN D DEFICIENCY: ICD-10-CM

## 2022-07-29 DIAGNOSIS — R79.89 MACROPROLACTINEMIA: ICD-10-CM

## 2022-07-29 PROCEDURE — 99211 OFF/OP EST MAY X REQ PHY/QHP: CPT | Performed by: INTERNAL MEDICINE

## 2022-07-29 PROCEDURE — 99214 OFFICE O/P EST MOD 30 MIN: CPT | Performed by: INTERNAL MEDICINE

## 2022-07-29 RX ORDER — CABERGOLINE 0.5 MG/1
0.25 TABLET ORAL
Qty: 12 TABLET | Refills: 3 | Status: SHIPPED | OUTPATIENT
Start: 2022-07-29 | End: 2023-02-10

## 2022-07-29 RX ORDER — MONTELUKAST SODIUM 4 MG/1
TABLET, CHEWABLE ORAL
COMMUNITY
Start: 2022-06-22 | End: 2023-02-10

## 2022-07-29 ASSESSMENT — FIBROSIS 4 INDEX: FIB4 SCORE: 1.63

## 2022-07-29 NOTE — PROGRESS NOTES
Chief Complaint: Follow up for Hyperprolactinemia due to a Macroprolactinoma, Hypothyroidism     HPI:     Gerri Castelan is a 66 y.o. female here for follow up of the above medical issue.  She was initially discovered to have a pituitary macroadenoma producing prolactin after she had some memory problems.  Initial MRI showed a 26 mm macroadenoma and her baseline prolactin was 889 on October 2018.    Interestingly she was started on bromocriptine by another endocrinologist instead of cabergoline but she responded to the medication and her last MRI showed significant reduction in the size of her macroadenoma with no measurements provided    Currently she is on bromocriptine 2.5 mg every night  She reports nausea with bromocriptine  She denies galactorrhea and headaches and blurring of vision    Her last prolactin was controlled at 1.58 on July 2022        She has primary hypothyroidism that was diagnosed over 10 years ago and this predates the diagnosis of her prior hyperprolactinemia.    She has remained on levothyroxine 75 MCG daily which has been her dose for at least 6 months to a year    She denies constipation and cold intolerance    Her last TSH was normal at 0.5 on July 2022        Patient's medications, allergies, and social histories were reviewed and updated as appropriate.      ROS:     CONS:     No fever, no chills   EYES:     No diplopia, no blurry vision   CV:           No chest pain, no palpitations   PULM:     No SOB, no cough, no hemoptysis.   GI:            No nausea, no vomiting, no diarrhea, no constipation   ENDO:     No polyuria, no polydipsia, no heat intolerance, no cold intolerance       Past Medical History:  Problem List:  2020-03: History of malignant melanoma  2020-03: History of left breast cancer  2020-03: History of bilateral mastectomy  2020-03: Grade II hemorrhoids  2020-03: Menopausal symptom  2019-05: Acute infective otitis externa, right  2018-10: Incidentally-noted  pituitary tumor (HCC)  2018-09: Pituitary adenoma with extrasellar extension (HCC)  2018-09: Pituitary macroadenoma (HCC)  2018-09: Amnesia  2018-09: Hypothyroidism  2016-12: Elevated fasting glucose  2016-11: Abnormal echocardiogram  2016-10: Family history of early CAD  2016-10: History of TIA (transient ischemic attack)  2016-09: Dyslipidemia  2016-09: Idiopathic hypothyroidism  2016-09: Altered mental status  2016-09: Chronic right shoulder pain  2016-09: Frequent loose stools  2016-03: Elevated TSH  2012-12: Enlarged gallbladder  2012-11: Gallbladder polyp  2011-02: Melanoma (HCC)  2010-12: Family history of breast cancer in mother  2009-05: GERD (gastroesophageal reflux disease)  Personal history of breast cancer  Chronic migraine without aura without status migrainosus, not   intractable  Prolactinoma (HCC)      Past Surgical History:  Past Surgical History:   Procedure Laterality Date   • ANAIS BY LAPAROSCOPY  12/6/2012    Performed by Yessica Levin M.D. at [facility]   • NIPPLE RECONSTRUCTION  12/1/2011    Performed by PATEL DALTON at [facility]   • FLAP GRAFT  12/1/2011    Performed by PATEL DALTON at [facility]   • BREAST RECONSTRUCTION  7/15/2011    Performed by PATEL DALTON at [facility]   • TISSUE EXPANDER PLACE/REMOVE  7/15/2011    Performed by PATEL DALTON at [facility]   • BREAST IMPLANT REVISION  7/15/2011    Performed by PATEL DALTON at [facility]   • CAPSULECTOMY  7/15/2011    Performed by PATEL DALTON at [facility]   • TISSUE TRANSFER/REARRANGE  7/15/2011    Performed by PATEL DALTON at [facility]   • MASTECTOMY  3/29/2011    Performed by YESSICA LEVIN at [facility]   • BREAST RECONSTRUCTION  3/29/2011    Performed by PATEL DALTON at [facility]   • TISSUE EXPANDER PLACE/REMOVE  3/29/2011    Performed by PATEL DALTON  "at SURGERY [facility]   • BREAST BIOPSY  2/24/2011    Performed by HANNAH LEVIN at SURGERY SAME DAY [facility]   • LUMPECTOMY  1/19/2011    Performed by HANNAH LEVIN at SURGERY SAME DAY [facility]   • NODE BIOPSY SENTINEL  1/19/2011    Performed by HANNAH LEVIN at SURGERY [facility] DAY [facility]   • COLONOSCOPY  2/2005    normal, due 2015   • OTHER ORTHOPEDIC SURGERY  1997    knee surgery ACL repair   • TUBAL LIGATION  1987   • OTHER  1983    malignant melanoma skin cancer upper back        Allergies:  Amoxicillin and Arimidex [anastrozole]     Social History:  Social History     Tobacco Use   • Smoking status: Never Smoker   • Smokeless tobacco: Never Used   Vaping Use   • Vaping Use: Never used   Substance Use Topics   • Alcohol use: Not Currently     Comment: occasional - wine or clara   • Drug use: No        Family History:   family history includes Cancer (age of onset: 60) in her mother; Dementia in her mother; Diabetes (age of onset: 54) in her brother; Heart Attack in her brother, maternal grandfather, and sister; Heart Disease (age of onset: 52) in her sister; Hyperlipidemia in her mother.      PHYSICAL EXAM:   Vital signs: /68 (BP Location: Right arm, Patient Position: Sitting, BP Cuff Size: Adult)   Pulse 84   Ht 1.626 m (5' 4\")   Wt 74.8 kg (165 lb)   LMP 10/30/2004   SpO2 96%   BMI 28.32 kg/m²   GENERAL: Well-developed, well-nourished in no apparent distress.   EYE:  No ocular asymmetry, PERRLA  HENT: Pink, moist mucous membranes.    NECK: No thyromegaly.   CARDIOVASCULAR:  No murmurs  LUNGS: Clear breath sounds  ABDOMEN: Soft, nontender   EXTREMITIES: No clubbing, cyanosis, or edema.   NEUROLOGICAL: No gross focal motor abnormalities   LYMPH: No cervical adenopathy palpated.   SKIN: No rashes, lesions.       Labs:  Lab Results   Component Value Date/Time    SODIUM 140 07/20/2022 07:10 AM    POTASSIUM 4.6 07/20/2022 07:10 AM    CHLORIDE 106 07/20/2022 07:10 AM    CO2 24 " 2022 07:10 AM    ANION 10.0 2022 07:10 AM    GLUCOSE 105 (H) 2022 07:10 AM    BUN 20 2022 07:10 AM    CREATININE 0.80 2022 07:10 AM    CALCIUM 9.8 2022 07:10 AM    ASTSGOT 20 2022 07:10 AM    ALTSGPT 17 2022 07:10 AM    TBILIRUBIN 0.6 2022 07:10 AM    ALBUMIN 4.4 2022 07:10 AM    TOTPROTEIN 6.5 2022 07:10 AM    GLOBULIN 2.1 2022 07:10 AM    AGRATIO 2.1 2022 07:10 AM       Lab Results   Component Value Date/Time    SODIUM 140 2022 0710    POTASSIUM 4.6 2022 0710    CHLORIDE 106 2022 0710    CO2 24 2022 0710    GLUCOSE 105 (H) 2022 0710    BUN 20 2022 0710    CREATININE 0.80 2022 0710    CALCIUM 9.8 2022 0710    ANION 10.0 2022 0710       Lab Results   Component Value Date/Time    CHOLSTRLTOT 174 2021 0714    TRIGLYCERIDE 193 (H) 2021 0714    HDL 55 2021 0714    LDL 80 2021 0714       Lab Results   Component Value Date/Time    TSHULTRASEN 0.500 2022 0710     Lab Results   Component Value Date/Time    FREET4 1.36 2022 0710     Lab Results   Component Value Date/Time    FREET3 2.68 2022 0710     No results found for: THYSTIMIG    Lab Results   Component Value Date/Time    MICROSOMALA 0.5 2019 1433         Imagin2021 1:51 PM     HISTORY/REASON FOR EXAM:  Pituitary adenoma        TECHNIQUE/EXAM DESCRIPTION:  MRI of the brain and pituitary without and with contrast.     T2 axial images were obtained of the whole brain. Thin-section T2 fast spin-echo coronal, T1 coronal, T1 postcontrast coronal, T1 sagittal, and T1 postcontrast sagittal images were obtained of the pituitary gland and sella.     The study was performed on a [employer] 1.5 [employer] MRI scanner.     14 mL ProHance contrast was administered intravenously.     COMPARISON: 2018, 2019 3/5/2020     FINDINGS:  The thin-section images of the sella show the pituitary to be of  normal size and configuration. There is uniform enhancement. There is a subtle gracile rim of hypoenhancement interposed between the pituitary gland in the floor of the sella. On the most   recent prior study there was nodular mass in this position. On the most distant prior study there was bulky mass occupying much of the sella. This rim of hypoenhancement now measures approximately 2 mm in thickness and exerts no significant mass effect.   There is no suprasellar extension. The pituitary stalk is in the midline. There are no suprasellar or parasellar mass lesions. The cavernous sinuses show normal enhancement and configuration. Vascular flow voids in the juxtasellar carotid arteries are   unremarkable.     The images of the whole brain show the calvaria to be unremarkable. There are no extraaxial fluid collections. There are no areas of abnormal signal or enhancement in the brain parenchyma. There are no mass effects or shift of midline structures. There   are no hemorrhagic lesions. There is diffuse prominence of the CSF containing spaces. There are sparse scattered foci of T2 prolongation in the deep and periventricular white matter which are nonspecific but which most likely reflect areas of chronic   microangiopathic ischemic change, though this can also be seen with gliosis and demyelinating processes.     The brainstem and posterior fossa structures are unremarkable.     Vascular flow voids in the vertebrobasilar and carotid arteries, [place], and dural venous sinuses are intact.     The paranasal sinuses and mastoids in the field of view are unremarkable.     IMPRESSION:     1.  Further interval decrease in size of the sellar mass  2.  MRI of the brain otherwise unremarkable for age with mild atrophy and mild white matter changes    ASSESSMENT/PLAN:     1. Hyperprolactinemia (HCC)  Controlled  Replace bromocriptine with cabergoline as cabergoline is more effective and has less side effects compared  to bromocriptine  Patient has nausea and bromocriptine  Start cabergoline half a tablet 2 times a week  Follow-up in 4 months with repeat of prolactin and IGF-I and serum creatinine    2. Macroprolactinemia  Patient has a prolactin producing tumor that has responded to medical therapy with reduction in size I will schedule her for repeat MRI this year      3. Primary hypothyroidism  Controlled  Continue levothyroxine 75 MCG daily  Repeat TSH levels again in 6 to 12 months    4. Vitamin D deficiency  Stable we will check calcium vitamin D with future labs      Return in about 4 months (around 11/29/2022).      This patient during there office visit today was started on a new medication.  Side effects of the new medication were discussed with the patient today in the office.     Thank you kindly for allowing me to participate in the thyroid care plan for this patient.    Jesus Smith MD, FACE, [facility]  07/29/22    CC:   Davidson Murillo M.D.

## 2022-08-16 ENCOUNTER — TELEPHONE (OUTPATIENT)
Dept: ENDOCRINOLOGY | Facility: MEDICAL CENTER | Age: 67
End: 2022-08-16
Payer: MEDICARE

## 2022-08-16 DIAGNOSIS — F41.9 ANXIETY DUE TO INVASIVE PROCEDURE: ICD-10-CM

## 2022-08-16 RX ORDER — LORAZEPAM 1 MG/1
1 TABLET ORAL EVERY 4 HOURS PRN
Qty: 4 TABLET | Refills: 0 | Status: SHIPPED | OUTPATIENT
Start: 2022-08-16 | End: 2022-08-18

## 2022-08-16 NOTE — TELEPHONE ENCOUNTER
VOICEMAIL  1. Caller Name: Gerri Castelan                        Call Back Number: [phone] (home)      2. Message: Patient called and left a message stating the she has an MRI coming up and is not good at getting them done. She asked if we could write her an RX for valium that she can take before her MRI?    Thank you.     3. Patient approves office to leave a detailed voicemail/MyChart message: yes

## 2022-08-17 NOTE — PROGRESS NOTES
One-time as needed dose of lorazepam ordered to assist with anxiety from imaging procedure ordered  Prescription drug monitoring was reviewed  There is no evidence of abuse or aberrant behavior

## 2022-08-29 ENCOUNTER — APPOINTMENT (OUTPATIENT)
Dept: RADIOLOGY | Facility: MEDICAL CENTER | Age: 67
End: 2022-08-29
Attending: INTERNAL MEDICINE
Payer: MEDICARE

## 2022-09-02 DIAGNOSIS — E78.5 DYSLIPIDEMIA: ICD-10-CM

## 2022-09-02 RX ORDER — ATORVASTATIN CALCIUM 20 MG/1
TABLET, FILM COATED ORAL
Qty: 90 TABLET | Refills: 3 | Status: SHIPPED | OUTPATIENT
Start: 2022-09-02 | End: 2023-06-21 | Stop reason: SDUPTHER

## 2022-09-04 ENCOUNTER — HOSPITAL ENCOUNTER (OUTPATIENT)
Dept: RADIOLOGY | Facility: MEDICAL CENTER | Age: 67
End: 2022-09-04
Attending: INTERNAL MEDICINE
Payer: MEDICARE

## 2022-09-04 DIAGNOSIS — R79.89 MACROPROLACTINEMIA: ICD-10-CM

## 2022-09-04 DIAGNOSIS — E22.1 HYPERPROLACTINEMIA (HCC): ICD-10-CM

## 2022-09-04 PROCEDURE — A9576 INJ PROHANCE MULTIPACK: HCPCS | Performed by: INTERNAL MEDICINE

## 2022-09-04 PROCEDURE — 700117 HCHG RX CONTRAST REV CODE 255: Performed by: INTERNAL MEDICINE

## 2022-09-04 PROCEDURE — 70553 MRI BRAIN STEM W/O & W/DYE: CPT

## 2022-09-04 RX ADMIN — GADOTERIDOL 15 ML: 279.3 INJECTION, SOLUTION INTRAVENOUS at 17:15

## 2022-11-01 ENCOUNTER — APPOINTMENT (RX ONLY)
Dept: URBAN - METROPOLITAN AREA CLINIC 22 | Facility: CLINIC | Age: 67
Setting detail: DERMATOLOGY
End: 2022-11-01

## 2022-11-01 DIAGNOSIS — L81.4 OTHER MELANIN HYPERPIGMENTATION: ICD-10-CM

## 2022-11-01 DIAGNOSIS — D18.0 HEMANGIOMA: ICD-10-CM

## 2022-11-01 DIAGNOSIS — Z85.828 PERSONAL HISTORY OF OTHER MALIGNANT NEOPLASM OF SKIN: ICD-10-CM

## 2022-11-01 DIAGNOSIS — L82.1 OTHER SEBORRHEIC KERATOSIS: ICD-10-CM

## 2022-11-01 DIAGNOSIS — Z85.820 PERSONAL HISTORY OF MALIGNANT MELANOMA OF SKIN: ICD-10-CM

## 2022-11-01 DIAGNOSIS — D22 MELANOCYTIC NEVI: ICD-10-CM

## 2022-11-01 PROBLEM — D22.5 MELANOCYTIC NEVI OF TRUNK: Status: ACTIVE | Noted: 2022-11-01

## 2022-11-01 PROBLEM — D48.5 NEOPLASM OF UNCERTAIN BEHAVIOR OF SKIN: Status: ACTIVE | Noted: 2022-11-01

## 2022-11-01 PROBLEM — D18.01 HEMANGIOMA OF SKIN AND SUBCUTANEOUS TISSUE: Status: ACTIVE | Noted: 2022-11-01

## 2022-11-01 PROCEDURE — ? BIOPSY BY SHAVE METHOD

## 2022-11-01 PROCEDURE — 11102 TANGNTL BX SKIN SINGLE LES: CPT

## 2022-11-01 PROCEDURE — ? SUNSCREEN TREATMENT REGIMEN

## 2022-11-01 PROCEDURE — ? COUNSELING

## 2022-11-01 PROCEDURE — 99213 OFFICE O/P EST LOW 20 MIN: CPT | Mod: 25

## 2022-11-01 ASSESSMENT — LOCATION DETAILED DESCRIPTION DERM
LOCATION DETAILED: RIGHT VENTRAL PROXIMAL FOREARM
LOCATION DETAILED: LEFT MID-UPPER BACK
LOCATION DETAILED: RIGHT LATERAL PROXIMAL UPPER ARM
LOCATION DETAILED: INFERIOR MID FOREHEAD
LOCATION DETAILED: LEFT INFERIOR CENTRAL MALAR CHEEK
LOCATION DETAILED: EPIGASTRIC SKIN
LOCATION DETAILED: LEFT SUPERIOR FLANK
LOCATION DETAILED: INFERIOR THORACIC SPINE
LOCATION DETAILED: LEFT VENTRAL PROXIMAL FOREARM
LOCATION DETAILED: RIGHT SUPERIOR MEDIAL UPPER BACK

## 2022-11-01 ASSESSMENT — LOCATION SIMPLE DESCRIPTION DERM
LOCATION SIMPLE: RIGHT UPPER BACK
LOCATION SIMPLE: RIGHT FOREARM
LOCATION SIMPLE: INFERIOR FOREHEAD
LOCATION SIMPLE: UPPER BACK
LOCATION SIMPLE: RIGHT UPPER ARM
LOCATION SIMPLE: LEFT CHEEK
LOCATION SIMPLE: LEFT UPPER BACK
LOCATION SIMPLE: ABDOMEN
LOCATION SIMPLE: LEFT FOREARM

## 2022-11-01 ASSESSMENT — LOCATION ZONE DERM
LOCATION ZONE: FACE
LOCATION ZONE: TRUNK
LOCATION ZONE: ARM

## 2022-11-03 ENCOUNTER — PATIENT MESSAGE (OUTPATIENT)
Dept: HEALTH INFORMATION MANAGEMENT | Facility: OTHER | Age: 67
End: 2022-11-03

## 2022-11-17 ENCOUNTER — HOSPITAL ENCOUNTER (OUTPATIENT)
Dept: LAB | Facility: MEDICAL CENTER | Age: 67
End: 2022-11-17
Attending: INTERNAL MEDICINE
Payer: MEDICARE

## 2022-11-17 DIAGNOSIS — E55.9 VITAMIN D DEFICIENCY: ICD-10-CM

## 2022-11-17 DIAGNOSIS — E03.9 PRIMARY HYPOTHYROIDISM: ICD-10-CM

## 2022-11-17 DIAGNOSIS — E22.1 HYPERPROLACTINEMIA (HCC): ICD-10-CM

## 2022-11-17 DIAGNOSIS — R79.89 MACROPROLACTINEMIA: ICD-10-CM

## 2022-11-17 LAB
25(OH)D3 SERPL-MCNC: 50 NG/ML (ref 30–100)
ALBUMIN SERPL BCP-MCNC: 4.6 G/DL (ref 3.2–4.9)
ALBUMIN/GLOB SERPL: 1.8 G/DL
ALP SERPL-CCNC: 81 U/L (ref 30–99)
ALT SERPL-CCNC: 20 U/L (ref 2–50)
ANION GAP SERPL CALC-SCNC: 10 MMOL/L (ref 7–16)
AST SERPL-CCNC: 19 U/L (ref 12–45)
BILIRUB SERPL-MCNC: 0.5 MG/DL (ref 0.1–1.5)
BUN SERPL-MCNC: 18 MG/DL (ref 8–22)
CALCIUM SERPL-MCNC: 10.1 MG/DL (ref 8.5–10.5)
CHLORIDE SERPL-SCNC: 105 MMOL/L (ref 96–112)
CO2 SERPL-SCNC: 23 MMOL/L (ref 20–33)
CREAT SERPL-MCNC: 0.64 MG/DL (ref 0.5–1.4)
GFR SERPLBLD CREATININE-BSD FMLA CKD-EPI: 96 ML/MIN/1.73 M 2
GLOBULIN SER CALC-MCNC: 2.5 G/DL (ref 1.9–3.5)
GLUCOSE SERPL-MCNC: 99 MG/DL (ref 65–99)
POTASSIUM SERPL-SCNC: 4.1 MMOL/L (ref 3.6–5.5)
PROLACTIN SERPL-MCNC: 2.45 NG/ML (ref 2.8–26)
PROT SERPL-MCNC: 7.1 G/DL (ref 6–8.2)
SODIUM SERPL-SCNC: 138 MMOL/L (ref 135–145)

## 2022-11-17 PROCEDURE — 36415 COLL VENOUS BLD VENIPUNCTURE: CPT

## 2022-11-17 PROCEDURE — 83520 IMMUNOASSAY QUANT NOS NONAB: CPT

## 2022-11-17 PROCEDURE — 80053 COMPREHEN METABOLIC PANEL: CPT

## 2022-11-17 PROCEDURE — 84305 ASSAY OF SOMATOMEDIN: CPT

## 2022-11-17 PROCEDURE — 82306 VITAMIN D 25 HYDROXY: CPT

## 2022-11-17 PROCEDURE — 84146 ASSAY OF PROLACTIN: CPT

## 2022-11-19 LAB
IGF-I SERPL-MCNC: 146 NG/ML (ref 28–231)
IGF-I Z-SCORE SERPL: 0.7

## 2022-11-29 ENCOUNTER — APPOINTMENT (RX ONLY)
Dept: URBAN - METROPOLITAN AREA CLINIC 22 | Facility: CLINIC | Age: 67
Setting detail: DERMATOLOGY
End: 2022-11-29

## 2022-11-29 PROBLEM — C44.311 BASAL CELL CARCINOMA OF SKIN OF NOSE: Status: ACTIVE | Noted: 2022-11-29

## 2022-11-29 PROCEDURE — ? MOHS SURGERY

## 2022-11-29 PROCEDURE — 17311 MOHS 1 STAGE H/N/HF/G: CPT

## 2022-11-29 PROCEDURE — ? PRESCRIPTION

## 2022-11-29 PROCEDURE — 14060 TIS TRNFR E/N/E/L 10 SQ CM/<: CPT

## 2022-11-29 RX ORDER — DOXYCYCLINE HYCLATE 100 MG/1
CAPSULE, GELATIN COATED ORAL BID
Qty: 20 | Refills: 0 | Status: ERX | COMMUNITY
Start: 2022-11-29

## 2022-11-29 RX ADMIN — DOXYCYCLINE HYCLATE: 100 CAPSULE, GELATIN COATED ORAL at 00:00

## 2022-11-29 NOTE — PROCEDURE: MOHS SURGERY
No Repair - Repaired With Adjacent Surgical Defect Text (Leave Blank If You Do Not Want): After obtaining clear surgical margins the defect was repaired concurrently with another surgical defect which was in close approximation.
Suture Removal: 14 days
Subsequent Stages Histo Method Verbiage: Using a similar technique to that described above, a thin layer of tissue was removed from all areas where tumor was visible on the previous stage.  The tissue was again oriented, mapped, dyed, and processed as above.
Hemigard Retention Suture: 0-0 Nylon
Stage 15: Additional Anesthesia Volume In Cc: 0
Display The Individual Mohs Indications As Separate Paragraphs: Yes
Cartilage Fenestration Performed?: No
Otolaryngologist Procedure Text (A): After obtaining clear surgical margins the patient was sent to otolaryngology for surgical repair.  The patient understands they will receive post-surgical care and follow-up from the referring physician's office.
Ear Wedge Repair Text: A wedge excision was completed by carrying down an excision through the full thickness of the ear and cartilage with an inward facing Burow's triangle. The wound was then closed in a layered fashion.
Full Thickness Lip Wedge Repair (Flap) Text: Given the location of the defect and the proximity to free margins a full thickness wedge repair was deemed most appropriate.  Using a sterile surgical marker, the appropriate repair was drawn incorporating the defect and placing the expected incisions perpendicular to the vermilion border.  The vermilion border was also meticulously outlined to ensure appropriate reapproximation during the repair.  The area thus outlined was incised through and through with a #15 scalpel blade.  The muscularis and dermis were reaproximated with deep sutures following hemostasis. Care was taken to realign the vermilion border before proceeding with the superficial closure.  Once the vermilion was realigned the superfical and mucosal closure was finished.
Suturegard Intro: Intraoperative tissue expansion was performed, utilizing the SUTUREGARD device, in order to reduce wound tension.
Stage 1: Number Of Blocks?: 1
Special Stains Stage 7 - Results: Base On Clearance Noted Above
Mohs Case Number: [id number]
Provider Procedure Text (A): After obtaining clear surgical margins the defect was repaired by another provider.
Mid-Level Procedure Text (F): After obtaining clear surgical margins the patient was sent to a mid-level provider for surgical repair.  The patient understands they will receive post-surgical care and follow-up from the mid-level provider.
Ear Star Wedge Flap Text: The defect edges were debeveled with a #15 blade scalpel.  Given the location of the defect and the proximity to free margins (helical rim) an ear star wedge flap was deemed most appropriate.  Using a sterile surgical marker, the appropriate flap was drawn incorporating the defect and placing the expected incisions between the helical rim and antihelix where possible.  The area thus outlined was incised through and through with a #15 scalpel blade.
Rotation Flap Text: The defect edges were debeveled with a #15 scalpel blade.  Given the location of the defect, shape of the defect and the proximity to free margins a rotation flap was deemed most appropriate.  Using a sterile surgical marker, an appropriate rotation flap was drawn incorporating the defect and placing the expected incisions within the relaxed skin tension lines where possible.    The area thus outlined was incised deep to adipose tissue with a #15 scalpel blade.  The skin margins were undermined to an appropriate distance in all directions utilizing iris scissors.
Donor Site Anesthesia Type: same as repair anesthesia
Area H Indication Text: Tumors in this location are included in Area H (eyelids, eyebrows, nose, lips, chin, ear, pre-auricular, post-auricular, temple, genitalia, hands, feet, ankles and areola).  Tissue conservation is critical in these anatomic locations.
No Residual Tumor Seen Histology Text: There were no malignant cells seen in the sections examined.
Consent (Ear)/Introductory Paragraph: The rationale for Mohs was explained to the patient and consent was obtained. The risks, benefits and alternatives to therapy were discussed in detail. Specifically, the risks of ear deformity, infection, scarring, bleeding, prolonged wound healing, incomplete removal, allergy to anesthesia, nerve injury and recurrence were addressed. Prior to the procedure, the treatment site was clearly identified and confirmed by the patient. All components of Universal Protocol/PAUSE Rule completed.
Surgeon Performing Repair (Optional): Imtiaz Evans MD
Stage 4: Additional Anesthesia Type: 1% lidocaine with epinephrine
Graft Cartilage Fenestration Text: The cartilage was fenestrated with a 2mm punch biopsy to help facilitate graft survival and healing.
Trilobed Flap Text: The defect edges were debeveled with a #15 scalpel blade.  Given the location of the defect and the proximity to free margins a trilobed flap was deemed most appropriate.  Using a sterile surgical marker, an appropriate trilobed flap drawn around the defect.    The area thus outlined was incised deep to adipose tissue with a #15 scalpel blade.  The skin margins were undermined to an appropriate distance in all directions utilizing iris scissors.
Burow's Advancement Flap Text: The defect edges were debeveled with a #15 scalpel blade.  Given the location of the defect and the proximity to free margins a Burow's advancement flap was deemed most appropriate.  Using a sterile surgical marker, the appropriate advancement flap was drawn incorporating the defect and placing the expected incisions within the relaxed skin tension lines where possible.    The area thus outlined was incised deep to adipose tissue with a #15 scalpel blade.  The skin margins were undermined to an appropriate distance in all directions utilizing iris scissors.
Z Plasty Text: The lesion was extirpated to the level of the fat with a #15 scalpel blade.  Given the location of the defect, shape of the defect and the proximity to free margins a Z-plasty was deemed most appropriate for repair.  Using a sterile surgical marker, the appropriate transposition arms of the Z-plasty were drawn incorporating the defect and placing the expected incisions within the relaxed skin tension lines where possible.    The area thus outlined was incised deep to adipose tissue with a #15 scalpel blade.  The skin margins were undermined to an appropriate distance in all directions utilizing iris scissors.  The opposing transposition arms were then transposed into place in opposite direction and anchored with interrupted buried subcutaneous sutures.
Initial Size Of Lesion: 0.7
Plastic Surgeon Procedure Text (B): After obtaining clear surgical margins the patient was sent to plastics for surgical repair.  The patient understands they will receive post-surgical care and follow-up from the referring physician's office.
Mohs Rapid Report Verbiage: The area of clinically evident tumor was marked with skin marking ink and appropriately hatched.  The initial incision was made following the Mohs approach through the skin.  The specimen was taken to the lab, divided into the necessary number of pieces, chromacoded and processed according to the Mohs protocol.  This was repeated in successive stages until a tumor free defect was achieved.
Bilobed Transposition Flap Text: The defect edges were debeveled with a #15 scalpel blade.  Given the location of the defect and the proximity to free margins a bilobed transposition flap was deemed most appropriate.  Using a sterile surgical marker, an appropriate bilobe flap drawn around the defect.    The area thus outlined was incised deep to adipose tissue with a #15 scalpel blade.  The skin margins were undermined to an appropriate distance in all directions utilizing iris scissors.
Oculoplastic Surgeon Procedure Text (B): After obtaining clear surgical margins the patient was sent to oculoplastics for surgical repair.  The patient understands they will receive post-surgical care and follow-up from the referring physician's office.
Mohs Method Verbiage: An incision at a 45 degree angle following the standard Mohs approach was done and the specimen was harvested as a microscopic controlled layer.
Crescentic Advancement Flap Text: The defect edges were debeveled with a #15 scalpel blade.  Given the location of the defect and the proximity to free margins a crescentic advancement flap was deemed most appropriate.  Using a sterile surgical marker, the appropriate advancement flap was drawn incorporating the defect and placing the expected incisions within the relaxed skin tension lines where possible.    The area thus outlined was incised deep to adipose tissue with a #15 scalpel blade.  The skin margins were undermined to an appropriate distance in all directions utilizing iris scissors.
M-Plasty Complex Repair Preamble Text (Leave Blank If You Do Not Want): Extensive wide undermining was performed.
Consent (Near Eyelid Margin)/Introductory Paragraph: The rationale for Mohs was explained to the patient and consent was obtained. The risks, benefits and alternatives to therapy were discussed in detail. Specifically, the risks of ectropion or eyelid deformity, infection, scarring, bleeding, prolonged wound healing, incomplete removal, allergy to anesthesia, nerve injury and recurrence were addressed. Prior to the procedure, the treatment site was clearly identified and confirmed by the patient. All components of Universal Protocol/PAUSE Rule completed.
Area M Indication Text: Tumors in this location are included in Area M (cheek, forehead, scalp, neck, jawline and pretibial skin).  Mohs surgery is indicated for tumors in these anatomic locations.
Alternatives Discussed Intro (Do Not Add Period): I discussed alternative treatments to Mohs surgery and specifically discussed the risks and benefits of
Double M-Plasty Intermediate Repair Preamble Text (Leave Blank If You Do Not Want): Undermining was performed with blunt dissection.
Detail Level: Detailed
Double O-Z Plasty Text: The defect edges were debeveled with a #15 scalpel blade.  Given the location of the defect, shape of the defect and the proximity to free margins a Double O-Z plasty (double transposition flap) was deemed most appropriate.  Using a sterile surgical marker, the appropriate transposition flaps were drawn incorporating the defect and placing the expected incisions within the relaxed skin tension lines where possible. The area thus outlined was incised deep to adipose tissue with a #15 scalpel blade.  The skin margins were undermined to an appropriate distance in all directions utilizing iris scissors.  Hemostasis was achieved with electrocautery.  The flaps were then transposed into place, one clockwise and the other counterclockwise, and anchored with interrupted buried subcutaneous sutures.
Staged Advancement Flap Text: The defect edges were debeveled with a #15 scalpel blade.  Given the location of the defect, shape of the defect and the proximity to free margins a staged advancement flap was deemed most appropriate.  Using a sterile surgical marker, an appropriate advancement flap was drawn incorporating the defect and placing the expected incisions within the relaxed skin tension lines where possible. The area thus outlined was incised deep to adipose tissue with a #15 scalpel blade.  The skin margins were undermined to an appropriate distance in all directions utilizing iris scissors.
Island Pedicle Flap With Canthal Suspension Text: The defect edges were debeveled with a #15 scalpel blade.  Given the location of the defect, shape of the defect and the proximity to free margins an island pedicle advancement flap was deemed most appropriate.  Using a sterile surgical marker, an appropriate advancement flap was drawn incorporating the defect, outlining the appropriate donor tissue and placing the expected incisions within the relaxed skin tension lines where possible. The area thus outlined was incised deep to adipose tissue with a #15 scalpel blade.  The skin margins were undermined to an appropriate distance in all directions around the primary defect and laterally outward around the island pedicle utilizing iris scissors.  There was minimal undermining beneath the pedicle flap. A suspension suture was placed in the canthal tendon to prevent tension and prevent ectropion.
Asc Procedure Text (C): After obtaining clear surgical margins the patient was sent to an ASC for surgical repair.  The patient understands they will receive post-surgical care and follow-up from the ASC physician.
Hatchet Flap Text: The defect edges were debeveled with a #15 scalpel blade.  Given the location of the defect, shape of the defect and the proximity to free margins a hatchet flap was deemed most appropriate.  Using a sterile surgical marker, an appropriate hatchet flap was drawn incorporating the defect and placing the expected incisions within the relaxed skin tension lines where possible.    The area thus outlined was incised deep to adipose tissue with a #15 scalpel blade.  The skin margins were undermined to an appropriate distance in all directions utilizing iris scissors.
Bcc Infiltrative Histology Text: There were numerous aggregates of basaloid cells demonstrating an infiltrative pattern.
Melolabial Interpolation Flap Text: A decision was made to reconstruct the defect utilizing an interpolation axial flap and a staged reconstruction.  A telfa template was made of the defect.  This telfa template was then used to outline the melolabial interpolation flap.  The donor area for the pedicle flap was then injected with anesthesia.  The flap was excised through the skin and subcutaneous tissue down to the layer of the underlying musculature.  The pedicle flap was carefully excised within this deep plane to maintain its blood supply.  The edges of the donor site were undermined.   The donor site was closed in a primary fashion.  The pedicle was then rotated into position and sutured.  Once the tube was sutured into place, adequate blood supply was confirmed with blanching and refill.  The pedicle was then wrapped with xeroform gauze and dressed appropriately with a telfa and gauze bandage to ensure continued blood supply and protect the attached pedicle.
Closure 2 Information: This tab is for additional flaps and grafts, including complex repair and grafts and complex repair and flaps. You can also specify a different location for the additional defect, if the location is the same you do not need to select a new one. We will insert the automated text for the repair you select below just as we do for solitary flaps and grafts. Please note that at this time if you select a location with a different insurance zone you will need to override the ICD10 and CPT if appropriate.
Closure 4 Information: This tab is for additional flaps and grafts above and beyond our usual structured repairs.  Please note if you enter information here it will not currently bill and you will need to add the billing information manually.
Wound Care (No Sutures): Petrolatum
Undermining Location (Optional): in the superficial subcutaneous fat
Consent (Nose)/Introductory Paragraph: The rationale for Mohs was explained to the patient and consent was obtained. The risks, benefits and alternatives to therapy were discussed in detail. Specifically, the risks of nasal deformity, changes in the flow of air through the nose, infection, scarring, bleeding, prolonged wound healing, incomplete removal, allergy to anesthesia, nerve injury and recurrence were addressed. Prior to the procedure, the treatment site was clearly identified and confirmed by the patient. All components of Universal Protocol/PAUSE Rule completed.
Home Suture Removal Text: Patient was provided instructions on removing sutures and will remove their sutures at home.  If they have any questions or difficulties they will call the office.
Medical Necessity Statement: Based on my medical judgement, Mohs surgery is the most appropriate treatment for this cancer compared to other treatments.
Abbe Flap (Lower To Upper Lip) Text: The defect of the upper lip was assessed and measured.  Given the location and size of the defect, an Abbe flap was deemed most appropriate.  Using a sterile surgical marker, an appropriate Abbe flap was measured and drawn on the lower lip. Local anesthesia was then infiltrated. A scalpel was then used to incise the upper lip through and through the skin, vermilion, muscle and mucosa, leaving the flap pedicled on the opposite side.  The flap was then rotated and transferred to the lower lip defect.  The flap was then sutured into place with a three layer technique, closing the orbicularis oris muscle layer with subcutaneous buried sutures, followed by a mucosal layer and an epidermal layer.
Adjacent Tissue Transfer Text: The defect edges were debeveled with a #15 scalpel blade.  Given the location of the defect and the proximity to free margins an adjacent tissue transfer was deemed most appropriate.  Using a sterile surgical marker, an appropriate flap was drawn incorporating the defect and placing the expected incisions within the relaxed skin tension lines where possible.    The area thus outlined was incised deep to adipose tissue with a #15 scalpel blade.  The skin margins were undermined to an appropriate distance in all directions utilizing iris scissors.
Dorsal Nasal Flap Text: The defect edges were debeveled with a #15 scalpel blade.  Given the location of the defect and the proximity to free margins a dorsal nasal flap was deemed most appropriate.  Using a sterile surgical marker, an appropriate dorsal nasal flap was drawn around the defect.    The area thus outlined was incised deep to adipose tissue with a #15 scalpel blade.  The skin margins were undermined to an appropriate distance in all directions utilizing iris scissors.
Partial Purse String (Simple) Text: Given the location of the defect and the characteristics of the surrounding skin a simple purse string closure was deemed most appropriate.  Undermining was performed circumfirentially around the surgical defect.  A purse string suture was then placed and tightened. Wound tension only allowed a partial closure of the circular defect.
Graft Donor Site Bandage (Optional-Leave Blank If You Don't Want In Note): Aquaplast was fitted to the graft site and sewn into place. A pressure bandage were applied to the donor site and over the aquaplast bolster.
Secondary Defect Length In Cm (Required For Flaps): 1.5
Skin Substitute Text: The defect edges were debeveled with a #15 scalpel blade.  Given the location of the defect, shape of the defect and the proximity to free margins a skin substitute graft was deemed most appropriate.  The graft material was trimmed to fit the size of the defect. The graft was then placed in the primary defect and oriented appropriately.
Bcc Histology Text: There were numerous aggregates of basaloid cells.
Zygomaticofacial Flap Text: Given the location of the defect, shape of the defect and the proximity to free margins a zygomaticofacial flap was deemed most appropriate for repair.  Using a sterile surgical marker, the appropriate flap was drawn incorporating the defect and placing the expected incisions within the relaxed skin tension lines where possible. The area thus outlined was incised deep to adipose tissue with a #15 scalpel blade with preservation of a vascular pedicle.  The skin margins were undermined to an appropriate distance in all directions utilizing iris scissors.  The flap was then placed into the defect and anchored with interrupted buried subcutaneous sutures.
Modified Advancement Flap Text: The defect edges were debeveled with a #15 scalpel blade.  Given the location of the defect, shape of the defect and the proximity to free margins a modified advancement flap was deemed most appropriate.  Using a sterile surgical marker, an appropriate advancement flap was drawn incorporating the defect and placing the expected incisions within the relaxed skin tension lines where possible.    The area thus outlined was incised deep to adipose tissue with a #15 scalpel blade.  The skin margins were undermined to an appropriate distance in all directions utilizing iris scissors.
Dermal Autograft Text: The defect edges were debeveled with a #15 scalpel blade.  Given the location of the defect, shape of the defect and the proximity to free margins a dermal autograft was deemed most appropriate.  Using a sterile surgical marker, the primary defect shape was transferred to the donor site. The area thus outlined was incised deep to adipose tissue with a #15 scalpel blade.  The harvested graft was then trimmed of adipose and epidermal tissue until only dermis was left.  The skin graft was then placed in the primary defect and oriented appropriately.
Repair Anesthesia Type: 2% Xylocaine with epinephrine and sodium bicarbonate
Previous Accession (Optional): [id number]
Composite Graft Text: The defect edges were debeveled with a #15 scalpel blade.  Given the location of the defect, shape of the defect, the proximity to free margins and the fact the defect was full thickness a composite graft was deemed most appropriate.  The defect was outline and then transferred to the donor site.  A full thickness graft was then excised from the donor site. The graft was then placed in the primary defect, oriented appropriately and then sutured into place.  The secondary defect was then repaired using a primary closure.
Double O-Z Flap Text: The defect edges were debeveled with a #15 scalpel blade.  Given the location of the defect, shape of the defect and the proximity to free margins a Double O-Z flap was deemed most appropriate.  Using a sterile surgical marker, an appropriate transposition flap was drawn incorporating the defect and placing the expected incisions within the relaxed skin tension lines where possible. The area thus outlined was incised deep to adipose tissue with a #15 scalpel blade.  The skin margins were undermined to an appropriate distance in all directions utilizing iris scissors.
Interpolation Flap Text: A decision was made to reconstruct the defect utilizing an interpolation axial flap and a staged reconstruction.  A telfa template was made of the defect.  This telfa template was then used to outline the interpolation flap.  The donor area for the pedicle flap was then injected with anesthesia.  The flap was excised through the skin and subcutaneous tissue down to the layer of the underlying musculature.  The interpolation flap was carefully excised within this deep plane to maintain its blood supply.  The edges of the donor site were undermined.   The donor site was closed in a primary fashion.  The pedicle was then rotated into position and sutured.  Once the tube was sutured into place, adequate blood supply was confirmed with blanching and refill.  The pedicle was then wrapped with xeroform gauze and dressed appropriately with a telfa and gauze bandage to ensure continued blood supply and protect the attached pedicle.
Xenograft Text: The defect edges were debeveled with a #15 scalpel blade.  Given the location of the defect, shape of the defect and the proximity to free margins a xenograft was deemed most appropriate.  The graft was then trimmed to fit the size of the defect.  The graft was then placed in the primary defect and oriented appropriately.
S Plasty Text: Given the location and shape of the defect, and the orientation of relaxed skin tension lines, an S-plasty was deemed most appropriate for repair.  Using a sterile surgical marker, the appropriate outline of the S-plasty was drawn, incorporating the defect and placing the expected incisions within the relaxed skin tension lines where possible.  The area thus outlined was incised deep to adipose tissue with a #15 scalpel blade.  The skin margins were undermined to an appropriate distance in all directions utilizing iris scissors. The skin flaps were advanced over the defect.  The opposing margins were then approximated with interrupted buried subcutaneous sutures.
Area L Indication Text: Tumors in this location are included in Area L (trunk and extremities).  Mohs surgery is indicated for larger tumors, or tumors with aggressive histologic features, in these anatomic locations.
Primary Defect Length In Cm (Final Defect Size - Required For Flaps/Grafts): 0.8
O-L Flap Text: The defect edges were debeveled with a #15 scalpel blade.  Given the location of the defect, shape of the defect and the proximity to free margins an O-L flap was deemed most appropriate.  Using a sterile surgical marker, an appropriate advancement flap was drawn incorporating the defect and placing the expected incisions within the relaxed skin tension lines where possible.    The area thus outlined was incised deep to adipose tissue with a #15 scalpel blade.  The skin margins were undermined to an appropriate distance in all directions utilizing iris scissors.
Keystone Flap Text: The defect edges were debeveled with a #15 scalpel blade.  Given the location of the defect, shape of the defect a keystone flap was deemed most appropriate.  Using a sterile surgical marker, an appropriate keystone flap was drawn incorporating the defect, outlining the appropriate donor tissue and placing the expected incisions within the relaxed skin tension lines where possible. The area thus outlined was incised deep to adipose tissue with a #15 scalpel blade.  The skin margins were undermined to an appropriate distance in all directions around the primary defect and laterally outward around the flap utilizing iris scissors.
Star Wedge Flap Text: The defect edges were debeveled with a #15 scalpel blade.  Given the location of the defect, shape of the defect and the proximity to free margins a star wedge flap was deemed most appropriate.  Using a sterile surgical marker, an appropriate rotation flap was drawn incorporating the defect and placing the expected incisions within the relaxed skin tension lines where possible. The area thus outlined was incised deep to adipose tissue with a #15 scalpel blade.  The skin margins were undermined to an appropriate distance in all directions utilizing iris scissors.
Mucosal Advancement Flap Text: Given the location of the defect, shape of the defect and the proximity to free margins a mucosal advancement flap was deemed most appropriate. Incisions were made with a 15 blade scalpel in the appropriate fashion along the cutaneous vermilion border and the mucosal lip. The remaining actinically damaged mucosal tissue was excised.  The mucosal advancement flap was then elevated to the gingival sulcus with care taken to preserve the neurovascular structures and advanced into the primary defect. Care was taken to ensure that precise realignment of the vermilion border was achieved.
Hemigard Intro: Due to skin fragility and wound tension, it was decided to use HEMIGARD adhesive retention suture devices to permit a linear closure. The skin was cleaned and dried for a 6cm distance away from the wound. Excessive hair, if present, was removed to allow for adhesion.
Rhombic Flap Text: The defect edges were debeveled with a #15 scalpel blade.  Given the location of the defect and the proximity to free margins a rhombic flap was deemed most appropriate.  Using a sterile surgical marker, an appropriate rhombic flap was drawn incorporating the defect.    The area thus outlined was incised deep to adipose tissue with a #15 scalpel blade.  The skin margins were undermined to an appropriate distance in all directions utilizing iris scissors.
Consent (Scalp)/Introductory Paragraph: The rationale for Mohs was explained to the patient and consent was obtained. The risks, benefits and alternatives to therapy were discussed in detail. Specifically, the risks of changes in hair growth pattern secondary to repair, infection, scarring, bleeding, prolonged wound healing, incomplete removal, allergy to anesthesia, nerve injury and recurrence were addressed. Prior to the procedure, the treatment site was clearly identified and confirmed by the patient. All components of Universal Protocol/PAUSE Rule completed.
Melolabial Transposition Flap Text: The defect edges were debeveled with a #15 scalpel blade.  Given the location of the defect and the proximity to free margins a melolabial flap was deemed most appropriate.  Using a sterile surgical marker, an appropriate melolabial transposition flap was drawn incorporating the defect.    The area thus outlined was incised deep to adipose tissue with a #15 scalpel blade.  The skin margins were undermined to an appropriate distance in all directions utilizing iris scissors.
Muscle Hinge Flap Text: The defect edges were debeveled with a #15 scalpel blade.  Given the size, depth and location of the defect and the proximity to free margins a muscle hinge flap was deemed most appropriate.  Using a sterile surgical marker, an appropriate hinge flap was drawn incorporating the defect. The area thus outlined was incised with a #15 scalpel blade.  The skin margins were undermined to an appropriate distance in all directions utilizing iris scissors.
Dressing (No Sutures): pressure dressing with telfa
Mohs Histo Method Verbiage: Each section was then chromacoded and processed in the Mohs lab using the Mohs protocol and submitted for frozen section.
Epidermal Autograft Text: The defect edges were debeveled with a #15 scalpel blade.  Given the location of the defect, shape of the defect and the proximity to free margins an epidermal autograft was deemed most appropriate.  Using a sterile surgical marker, the primary defect shape was transferred to the donor site. The epidermal graft was then harvested.  The skin graft was then placed in the primary defect and oriented appropriately.
X Size Of Lesion In Cm (Optional): 0.4
Eye Protection Verbiage: Before proceeding with the stage, a plastic scleral shield was inserted. The globe was anesthetized with a few drops of 1% lidocaine with 1:100,000 epinephrine. Then, an appropriate sized scleral shield was chosen and coated with lacrilube ointment. The shield was gently inserted and left in place for the duration of each stage. After the stage was completed, the shield was gently removed.
Bilobed Flap Text: The defect edges were debeveled with a #15 scalpel blade.  Given the location of the defect and the proximity to free margins a bilobe flap was deemed most appropriate.  Using a sterile surgical marker, an appropriate bilobe flap drawn around the defect.    The area thus outlined was incised deep to adipose tissue with a #15 scalpel blade.  The skin margins were undermined to an appropriate distance in all directions utilizing iris scissors.
Island Pedicle Flap-Requiring Vessel Identification Text: The defect edges were debeveled with a #15 scalpel blade.  Given the location of the defect, shape of the defect and the proximity to free margins an island pedicle advancement flap was deemed most appropriate.  Using a sterile surgical marker, an appropriate advancement flap was drawn, based on the axial vessel mentioned above, incorporating the defect, outlining the appropriate donor tissue and placing the expected incisions within the relaxed skin tension lines where possible.    The area thus outlined was incised deep to adipose tissue with a #15 scalpel blade.  The skin margins were undermined to an appropriate distance in all directions around the primary defect and laterally outward around the island pedicle utilizing iris scissors.  There was minimal undermining beneath the pedicle flap.
Ftsg Text: The defect edges were debeveled with a #15 scalpel blade.  Given the location of the defect, shape of the defect and the proximity to free margins a full thickness skin graft was deemed most appropriate.  Using a sterile surgical marker, the primary defect shape was transferred to the donor site. The area thus outlined was incised deep to adipose tissue with a #15 scalpel blade.  The harvested graft was then trimmed of adipose tissue until only dermis and epidermis was left.  The skin margins of the secondary defect were undermined to an appropriate distance in all directions utilizing iris scissors.  The secondary defect was closed with interrupted buried subcutaneous sutures.  The skin edges were then re-apposed with running  sutures.  The skin graft was then placed in the primary defect and oriented appropriately.
Cheiloplasty (Less Than 50%) Text: A decision was made to reconstruct the defect with a  cheiloplasty.  The defect was undermined extensively.  Additional obicularis oris muscle was excised with a 15 blade scalpel.  The defect was converted into a full thickness wedge, of less than 50% of the vertical height of the lip, to facilite a better cosmetic result.  Small vessels were then tied off with 5-0 monocyrl. The obicularis oris, superficial fascia, adipose and dermis were then reapproximated.  After the deeper layers were approximated the epidermis was reapproximated with particular care given to realign the vermilion border.
Cheek-To-Nose Interpolation Flap Text: A decision was made to reconstruct the defect utilizing an interpolation axial flap and a staged reconstruction.  A telfa template was made of the defect.  This telfa template was then used to outline the Cheek-To-Nose Interpolation flap.  The donor area for the pedicle flap was then injected with anesthesia.  The flap was excised through the skin and subcutaneous tissue down to the layer of the underlying musculature.  The interpolation flap was carefully excised within this deep plane to maintain its blood supply.  The edges of the donor site were undermined.   The donor site was closed in a primary fashion.  The pedicle was then rotated into position and sutured.  Once the tube was sutured into place, adequate blood supply was confirmed with blanching and refill.  The pedicle was then wrapped with xeroform gauze and dressed appropriately with a telfa and gauze bandage to ensure continued blood supply and protect the attached pedicle.
O-Z Plasty Text: The defect edges were debeveled with a #15 scalpel blade.  Given the location of the defect, shape of the defect and the proximity to free margins an O-Z plasty (double transposition flap) was deemed most appropriate.  Using a sterile surgical marker, the appropriate transposition flaps were drawn incorporating the defect and placing the expected incisions within the relaxed skin tension lines where possible.    The area thus outlined was incised deep to adipose tissue with a #15 scalpel blade.  The skin margins were undermined to an appropriate distance in all directions utilizing iris scissors.  Hemostasis was achieved with electrocautery.  The flaps were then transposed into place, one clockwise and the other counterclockwise, and anchored with interrupted buried subcutaneous sutures.
Posterior Auricular Interpolation Flap Text: A decision was made to reconstruct the defect utilizing an interpolation axial flap and a staged reconstruction.  A telfa template was made of the defect.  This telfa template was then used to outline the posterior auricular interpolation flap.  The donor area for the pedicle flap was then injected with anesthesia.  The flap was excised through the skin and subcutaneous tissue down to the layer of the underlying musculature.  The pedicle flap was carefully excised within this deep plane to maintain its blood supply.  The edges of the donor site were undermined.   The donor site was closed in a primary fashion.  The pedicle was then rotated into position and sutured.  Once the tube was sutured into place, adequate blood supply was confirmed with blanching and refill.  The pedicle was then wrapped with xeroform gauze and dressed appropriately with a telfa and gauze bandage to ensure continued blood supply and protect the attached pedicle.
Complex Repair And Flap Additional Text (Will Appearing After The Standard Complex Repair Text): The complex repair was not sufficient to completely close the primary defect. The remaining additional defect was repaired with the flap mentioned below.
Purse String (Simple) Text: Given the location of the defect and the characteristics of the surrounding skin a purse string closure was deemed most appropriate.  Undermining was performed circumfirentially around the surgical defect.  A purse string suture was then placed and tightened.
Mustarde Flap Text: The defect edges were debeveled with a #15 scalpel blade.  Given the size, depth and location of the defect and the proximity to free margins a Mustarde flap was deemed most appropriate.  Using a sterile surgical marker, an appropriate flap was drawn incorporating the defect. The area thus outlined was incised with a #15 scalpel blade.  The skin margins were undermined to an appropriate distance in all directions utilizing iris scissors.
Information: Selecting Yes will display possible errors in your note based on the variables you have selected. This validation is only offered as a suggestion for you. PLEASE NOTE THAT THE VALIDATION TEXT WILL BE REMOVED WHEN YOU FINALIZE YOUR NOTE. IF YOU WANT TO FAX A PRELIMINARY NOTE YOU WILL NEED TO TOGGLE THIS TO 'NO' IF YOU DO NOT WANT IT IN YOUR FAXED NOTE.
Advancement Flap (Single) Text: The defect edges were debeveled with a #15 scalpel blade.  Given the location of the defect and the proximity to free margins a single advancement flap was deemed most appropriate.  Using a sterile surgical marker, an appropriate advancement flap was drawn incorporating the defect and placing the expected incisions within the relaxed skin tension lines where possible.    The area thus outlined was incised deep to adipose tissue with a #15 scalpel blade.  The skin margins were undermined to an appropriate distance in all directions utilizing iris scissors.
Staging Info: By selecting yes to the question above you will include information on AJCC 8 tumor staging in your Mohs note. Information on tumor staging will be automatically added for SCCs on the head and neck. AJCC 8 includes tumor size, tumor depth, perineural involvement and bone invasion.
Referring Physician (Optional): Yokasta Shcroeder
Split-Thickness Skin Graft Text: The defect edges were debeveled with a #15 scalpel blade.  Given the location of the defect, shape of the defect and the proximity to free margins a split thickness skin graft was deemed most appropriate.  Using a sterile surgical marker, the primary defect shape was transferred to the donor site. The split thickness graft was then harvested.  The skin graft was then placed in the primary defect and oriented appropriately.
Brow Lift Text: A midfrontal incision was made medially to the defect to allow access to the tissues just superior to the left eyebrow. Following careful dissection inferiorly in a supraperiosteal plane to the level of the left eyebrow, several 3-0 monocryl sutures were used to resuspend the eyebrow orbicularis oculi muscular unit to the superior frontal bone periosteum. This resulted in an appropriate reapproximation of static eyebrow symmetry and correction of the left brow ptosis.
Retention Suture Bite Size: 1 mm
Was The Patient On Physician Recommended Anticoagulation Therapy?: Please Select the Appropriate Response
Repair Hemostasis (Optional): Pinpoint electrocautery
Cartilage Graft Text: The defect edges were debeveled with a #15 scalpel blade.  Given the location of the defect, shape of the defect, the fact the defect involved a full thickness cartilage defect a cartilage graft was deemed most appropriate.  An appropriate donor site was identified, cleansed, and anesthetized. The cartilage graft was then harvested and transferred to the recipient site, oriented appropriately and then sutured into place.  The secondary defect was then repaired using a primary closure.
Estlander Flap (Upper To Lower Lip) Text: The defect of the lower lip was assessed and measured.  Given the location and size of the defect, an Estlander flap was deemed most appropriate.  Using a sterile surgical marker, an appropriate Estlander flap was measured and drawn on the upper lip. Local anesthesia was then infiltrated. A scalpel was then used to incise the lateral aspect of the flap, through skin, muscle and mucosa, leaving the flap pedicled medially.  The flap was then rotated and positioned to fill the lower lip defect.  The flap was then sutured into place with a three layer technique, closing the orbicularis oris muscle layer with subcutaneous buried sutures, followed by a mucosal layer and an epidermal layer.
Repair Anesthesia Method: local infiltration
Graft Donor Site Epidermal Sutures (Optional): 5-0 Surgipro
Consent 2/Introductory Paragraph: Mohs surgery was explained to the patient and consent was obtained. The risks, benefits and alternatives to therapy were discussed in detail. Specifically, the risks of infection, scarring, bleeding, prolonged wound healing, incomplete removal, allergy to anesthesia, nerve injury and recurrence were addressed. Prior to the procedure, the treatment site was clearly identified and confirmed by the patient. All components of Universal Protocol/PAUSE Rule completed.
Mart-1 - Positive Histology Text: MART-1 staining demonstrates areas of higher density and clustering of melanocytes with Pagetoid spread upwards within the epidermis. The surgical margins are positive for tumor cells.
Tumor Depth: Less than 6mm from granular layer and no invasion beyond the subcutaneous fat
Surgeon/Pathologist Verbiage (Will Incorporate Name Of Surgeon From Intro If Not Blank): operated in two distinct and integrated capacities as the surgeon and pathologist.
O-T Plasty Text: The defect edges were debeveled with a #15 scalpel blade.  Given the location of the defect, shape of the defect and the proximity to free margins an O-T plasty was deemed most appropriate.  Using a sterile surgical marker, an appropriate O-T plasty was drawn incorporating the defect and placing the expected incisions within the relaxed skin tension lines where possible.    The area thus outlined was incised deep to adipose tissue with a #15 scalpel blade.  The skin margins were undermined to an appropriate distance in all directions utilizing iris scissors.
Primary Defect Width In Cm (Final Defect Size - Required For Flaps/Grafts): 0.6
Nasal Turnover Hinge Flap Text: The defect edges were debeveled with a #15 scalpel blade.  Given the size, depth, location of the defect and the defect being full thickness a nasal turnover hinge flap was deemed most appropriate.  Using a sterile surgical marker, an appropriate hinge flap was drawn incorporating the defect. The area thus outlined was incised with a #15 scalpel blade. The flap was designed to recreate the nasal mucosal lining and the alar rim. The skin margins were undermined to an appropriate distance in all directions utilizing iris scissors.
Repair Type: Flap
Manual Repair Warning Statement: We plan on removing the manually selected variable below in favor of our much easier automatic structured text blocks found in the previous tab. We decided to do this to help make the flow better and give you the full power of structured data. Manual selection is never going to be ideal in our platform and I would encourage you to avoid using manual selection from this point on, especially since I will be sunsetting this feature. It is important that you do one of two things with the customized text below. First, you can save all of the text in a word file so you can have it for future reference. Second, transfer the text to the appropriate area in the Library tab. Lastly, if there is a flap or graft type which we do not have you need to let us know right away so I can add it in before the variable is hidden. No need to panic, we plan to give you roughly 6 months to make the change.
Deep Sutures: 5-0 Maxon
Spiral Flap Text: The defect edges were debeveled with a #15 scalpel blade.  Given the location of the defect, shape of the defect and the proximity to free margins a spiral flap was deemed most appropriate.  Using a sterile surgical marker, an appropriate rotation flap was drawn incorporating the defect and placing the expected incisions within the relaxed skin tension lines where possible. The area thus outlined was incised deep to adipose tissue with a #15 scalpel blade.  The skin margins were undermined to an appropriate distance in all directions utilizing iris scissors.
Tarsorrhaphy Text: A tarsorrhaphy was performed using Frost sutures.
Alar Island Pedicle Flap Text: The defect edges were debeveled with a #15 scalpel blade.  Given the location of the defect, shape of the defect and the proximity to the alar rim an island pedicle advancement flap was deemed most appropriate.  Using a sterile surgical marker, an appropriate advancement flap was drawn incorporating the defect, outlining the appropriate donor tissue and placing the expected incisions within the nasal ala running parallel to the alar rim. The area thus outlined was incised with a #15 scalpel blade.  The skin margins were undermined minimally to an appropriate distance in all directions around the primary defect and laterally outward around the island pedicle utilizing iris scissors.  There was minimal undermining beneath the pedicle flap.
Tissue Cultured Epidermal Autograft Text: The defect edges were debeveled with a #15 scalpel blade.  Given the location of the defect, shape of the defect and the proximity to free margins a tissue cultured epidermal autograft was deemed most appropriate.  The graft was then trimmed to fit the size of the defect.  The graft was then placed in the primary defect and oriented appropriately.
Consent (Spinal Accessory)/Introductory Paragraph: The rationale for Mohs was explained to the patient and consent was obtained. The risks, benefits and alternatives to therapy were discussed in detail. Specifically, the risks of damage to the spinal accessory nerve, infection, scarring, bleeding, prolonged wound healing, incomplete removal, allergy to anesthesia, and recurrence were addressed. Prior to the procedure, the treatment site was clearly identified and confirmed by the patient. All components of Universal Protocol/PAUSE Rule completed.
Mercedes Flap Text: The defect edges were debeveled with a #15 scalpel blade.  Given the location of the defect, shape of the defect and the proximity to free margins a Mercedes flap was deemed most appropriate.  Using a sterile surgical marker, an appropriate advancement flap was drawn incorporating the defect and placing the expected incisions within the relaxed skin tension lines where possible. The area thus outlined was incised deep to adipose tissue with a #15 scalpel blade.  The skin margins were undermined to an appropriate distance in all directions utilizing iris scissors.
Localized Dermabrasion With Wire Brush Text: The patient was draped in routine manner.  Localized dermabrasion using 3 x 17 mm wire brush was performed in routine manner to papillary dermis. This spot dermabrasion is being performed to complete skin cancer reconstruction. It also will eliminate the other sun damaged precancerous cells that are known to be part of the regional effect of a lifetime's worth of sun exposure. This localized dermabrasion is therapeutic and should not be considered cosmetic in any regard.
Suturegard Body: The suture ends were repeatedly re-tightened and re-clamped to achieve the desired tissue expansion.
Bi-Rhombic Flap Text: The defect edges were debeveled with a #15 scalpel blade.  Given the location of the defect and the proximity to free margins a bi-rhombic flap was deemed most appropriate.  Using a sterile surgical marker, an appropriate rhombic flap was drawn incorporating the defect. The area thus outlined was incised deep to adipose tissue with a #15 scalpel blade.  The skin margins were undermined to an appropriate distance in all directions utilizing iris scissors.
Inflammation Suggestive Of Cancer Camouflage Histology Text: There was a dense lymphocytic infiltrate which prevented adequate histologic evaluation of adjacent structures.
Consent 1/Introductory Paragraph: The rationale for Mohs was explained to the patient and consent was obtained. The risks, benefits and alternatives to therapy were discussed in detail. Specifically, the risks of infection, scarring, bleeding, prolonged wound healing, incomplete removal, allergy to anesthesia, nerve injury and recurrence were addressed. Prior to the procedure, the treatment site was clearly identified and confirmed by the patient. All components of Universal Protocol/PAUSE Rule completed.
Burow's Graft Text: The defect edges were debeveled with a #15 scalpel blade.  Given the location of the defect, shape of the defect, the proximity to free margins and the presence of a standing cone deformity a Burow's skin graft was deemed most appropriate. The standing cone was removed and this tissue was then trimmed to the shape of the primary defect. The adipose tissue was also removed until only dermis and epidermis were left.  The skin margins of the secondary defect were undermined to an appropriate distance in all directions utilizing iris scissors.  The secondary defect was closed with interrupted buried subcutaneous sutures.  The skin edges were then re-apposed with running  sutures.  The skin graft was then placed in the primary defect and oriented appropriately.
Retention Suture Text: Retention sutures were placed to support the closure and prevent dehiscence.
Cheek Interpolation Flap Text: A decision was made to reconstruct the defect utilizing an interpolation axial flap and a staged reconstruction.  A telfa template was made of the defect.  This telfa template was then used to outline the Cheek Interpolation flap.  The donor area for the pedicle flap was then injected with anesthesia.  The flap was excised through the skin and subcutaneous tissue down to the layer of the underlying musculature.  The interpolation flap was carefully excised within this deep plane to maintain its blood supply.  The edges of the donor site were undermined.   The donor site was closed in a primary fashion.  The pedicle was then rotated into position and sutured.  Once the tube was sutured into place, adequate blood supply was confirmed with blanching and refill.  The pedicle was then wrapped with xeroform gauze and dressed appropriately with a telfa and gauze bandage to ensure continued blood supply and protect the attached pedicle.
Peng Advancement Flap Text: The defect edges were debeveled with a #15 scalpel blade.  Given the location of the defect, shape of the defect and the proximity to free margins a Peng advancement flap was deemed most appropriate.  Using a sterile surgical marker, an appropriate advancement flap was drawn incorporating the defect and placing the expected incisions within the relaxed skin tension lines where possible. The area thus outlined was incised deep to adipose tissue with a #15 scalpel blade.  The skin margins were undermined to an appropriate distance in all directions utilizing iris scissors.
Location Indication Override (Is Already Calculated Based On Selected Body Location): Area H
Non-Graft Cartilage Fenestration Text: The cartilage was fenestrated with a 2mm punch biopsy to help facilitate healing.
Mastoid Interpolation Flap Text: A decision was made to reconstruct the defect utilizing an interpolation axial flap and a staged reconstruction.  A telfa template was made of the defect.  This telfa template was then used to outline the mastoid interpolation flap.  The donor area for the pedicle flap was then injected with anesthesia.  The flap was excised through the skin and subcutaneous tissue down to the layer of the underlying musculature.  The pedicle flap was carefully excised within this deep plane to maintain its blood supply.  The edges of the donor site were undermined.   The donor site was closed in a primary fashion.  The pedicle was then rotated into position and sutured.  Once the tube was sutured into place, adequate blood supply was confirmed with blanching and refill.  The pedicle was then wrapped with xeroform gauze and dressed appropriately with a telfa and gauze bandage to ensure continued blood supply and protect the attached pedicle.
Consent (Lip)/Introductory Paragraph: The rationale for Mohs was explained to the patient and consent was obtained. The risks, benefits and alternatives to therapy were discussed in detail. Specifically, the risks of lip deformity, changes in the oral aperture, infection, scarring, bleeding, prolonged wound healing, incomplete removal, allergy to anesthesia, nerve injury and recurrence were addressed. Prior to the procedure, the treatment site was clearly identified and confirmed by the patient. All components of Universal Protocol/PAUSE Rule completed.
Pain Refusal Text: I offered to prescribe pain medication but the patient refused to take this medication.
Advancement Flap (Double) Text: The defect edges were debeveled with a #15 scalpel blade.  Given the location of the defect and the proximity to free margins a double advancement flap was deemed most appropriate.  Using a sterile surgical marker, the appropriate advancement flaps were drawn incorporating the defect and placing the expected incisions within the relaxed skin tension lines where possible.    The area thus outlined was incised deep to adipose tissue with a #15 scalpel blade.  The skin margins were undermined to an appropriate distance in all directions utilizing iris scissors.
Partial Purse String (Intermediate) Text: Given the location of the defect and the characteristics of the surrounding skin an intermediate purse string closure was deemed most appropriate.  Undermining was performed circumfirentially around the surgical defect.  A purse string suture was then placed and tightened. Wound tension only allowed a partial closure of the circular defect.
Abbe Flap (Upper To Lower Lip) Text: The defect of the lower lip was assessed and measured.  Given the location and size of the defect, an Abbe flap was deemed most appropriate.  Using a sterile surgical marker, an appropriate Abbe flap was measured and drawn on the upper lip. Local anesthesia was then infiltrated.  A scalpel was then used to incise the upper lip through and through the skin, vermilion, muscle and mucosa, leaving the flap pedicled on the opposite side.  The flap was then rotated and transferred to the lower lip defect.  The flap was then sutured into place with a three layer technique, closing the orbicularis oris muscle layer with subcutaneous buried sutures, followed by a mucosal layer and an epidermal layer.
Debridement Text: The wound edges were debrided prior to proceeding with the closure to facilitate wound healing.
Complex Repair And Graft Additional Text (Will Appearing After The Standard Complex Repair Text): The complex repair was not sufficient to completely close the primary defect. The remaining additional defect was repaired with the graft mentioned below.
Helical Rim Advancement Flap Text: The defect edges were debeveled with a #15 blade scalpel.  Given the location of the defect and the proximity to free margins (helical rim) a double helical rim advancement flap was deemed most appropriate.  Using a sterile surgical marker, the appropriate advancement flaps were drawn incorporating the defect and placing the expected incisions between the helical rim and antihelix where possible.  The area thus outlined was incised through and through with a #15 scalpel blade.  With a skin hook and iris scissors, the flaps were gently and sharply undermined and freed up.
Transposition Flap Text: The defect edges were debeveled with a #15 scalpel blade.  Given the location of the defect and the proximity to free margins a transposition flap was deemed most appropriate.  Using a sterile surgical marker, an appropriate transposition flap was drawn incorporating the defect.    The area thus outlined was incised deep to adipose tissue with a #15 scalpel blade.  The skin margins were undermined to an appropriate distance in all directions utilizing iris scissors.
Chonodrocutaneous Helical Advancement Flap Text: The defect edges were debeveled with a #15 scalpel blade.  Given the location of the defect and the proximity to free margins a chondrocutaneous helical advancement flap was deemed most appropriate.  Using a sterile surgical marker, the appropriate advancement flap was drawn incorporating the defect and placing the expected incisions within the relaxed skin tension lines where possible.    The area thus outlined was incised deep to adipose tissue with a #15 scalpel blade.  The skin margins were undermined to an appropriate distance in all directions utilizing iris scissors.
Purse String (Intermediate) Text: Given the location of the defect and the characteristics of the surrounding skin a purse string intermediate closure was deemed most appropriate.  Undermining was performed circumfirentially around the surgical defect.  A purse string suture was then placed and tightened.
Advancement-Rotation Flap Text: The defect edges were debeveled with a #15 scalpel blade.  Given the location of the defect, shape of the defect and the proximity to free margins an advancement-rotation flap was deemed most appropriate.  Using a sterile surgical marker, an appropriate flap was drawn incorporating the defect and placing the expected incisions within the relaxed skin tension lines where possible. The area thus outlined was incised deep to adipose tissue with a #15 scalpel blade.  The skin margins were undermined to an appropriate distance in all directions utilizing iris scissors.
Rhomboid Transposition Flap Text: The defect edges were debeveled with a #15 scalpel blade.  Given the location of the defect and the proximity to free margins a rhomboid transposition flap was deemed most appropriate.  Using a sterile surgical marker, an appropriate rhomboid flap was drawn incorporating the defect.    The area thus outlined was incised deep to adipose tissue with a #15 scalpel blade.  The skin margins were undermined to an appropriate distance in all directions utilizing iris scissors.
Double Island Pedicle Flap Text: The defect edges were debeveled with a #15 scalpel blade.  Given the location of the defect, shape of the defect and the proximity to free margins a double island pedicle advancement flap was deemed most appropriate.  Using a sterile surgical marker, an appropriate advancement flap was drawn incorporating the defect, outlining the appropriate donor tissue and placing the expected incisions within the relaxed skin tension lines where possible.    The area thus outlined was incised deep to adipose tissue with a #15 scalpel blade.  The skin margins were undermined to an appropriate distance in all directions around the primary defect and laterally outward around the island pedicle utilizing iris scissors.  There was minimal undermining beneath the pedicle flap.
Same Histology In Subsequent Stages Text: The pattern and morphology of the tumor is as described in the first stage.
Secondary Intention Text (Leave Blank If You Do Not Want): The defect will heal with secondary intention.
Mart-1 - Negative Histology Text: MART-1 staining demonstrates a normal density and pattern of melanocytes along the dermal-epidermal junction. The surgical margins are negative for tumor cells.
V-Y Flap Text: The defect edges were debeveled with a #15 scalpel blade.  Given the location of the defect, shape of the defect and the proximity to free margins a V-Y flap was deemed most appropriate.  Using a sterile surgical marker, an appropriate advancement flap was drawn incorporating the defect and placing the expected incisions within the relaxed skin tension lines where possible.    The area thus outlined was incised deep to adipose tissue with a #15 scalpel blade.  The skin margins were undermined to an appropriate distance in all directions utilizing iris scissors.
A-T Advancement Flap Text: The defect edges were debeveled with a #15 scalpel blade.  Given the location of the defect, shape of the defect and the proximity to free margins an A-T advancement flap was deemed most appropriate.  Using a sterile surgical marker, an appropriate advancement flap was drawn incorporating the defect and placing the expected incisions within the relaxed skin tension lines where possible.    The area thus outlined was incised deep to adipose tissue with a #15 scalpel blade.  The skin margins were undermined to an appropriate distance in all directions utilizing iris scissors.
Undermining Type: Entire Wound
Bilateral Helical Rim Advancement Flap Text: The defect edges were debeveled with a #15 blade scalpel.  Given the location of the defect and the proximity to free margins (helical rim) a bilateral helical rim advancement flap was deemed most appropriate.  Using a sterile surgical marker, the appropriate advancement flaps were drawn incorporating the defect and placing the expected incisions between the helical rim and antihelix where possible.  The area thus outlined was incised through and through with a #15 scalpel blade.  With a skin hook and iris scissors, the flaps were gently and sharply undermined and freed up.
Flap Type: Advancement Flap (Single)
Consent (Marginal Mandibular)/Introductory Paragraph: The rationale for Mohs was explained to the patient and consent was obtained. The risks, benefits and alternatives to therapy were discussed in detail. Specifically, the risks of damage to the marginal mandibular branch of the facial nerve, infection, scarring, bleeding, prolonged wound healing, incomplete removal, allergy to anesthesia, and recurrence were addressed. Prior to the procedure, the treatment site was clearly identified and confirmed by the patient. All components of Universal Protocol/PAUSE Rule completed.
Postop Diagnosis: same
Helical Rim Text: The closure involved the helical rim.
Banner Transposition Flap Text: The defect edges were debeveled with a #15 scalpel blade.  Given the location of the defect and the proximity to free margins a Banner transposition flap was deemed most appropriate.  Using a sterile surgical marker, an appropriate flap drawn around the defect. The area thus outlined was incised deep to adipose tissue with a #15 scalpel blade.  The skin margins were undermined to an appropriate distance in all directions utilizing iris scissors.
Wound Care: Vaseline
Nostril Rim Text: The closure involved the nostril rim.
H Plasty Text: Given the location of the defect, shape of the defect and the proximity to free margins a H-plasty was deemed most appropriate for repair.  Using a sterile surgical marker, the appropriate advancement arms of the H-plasty were drawn incorporating the defect and placing the expected incisions within the relaxed skin tension lines where possible. The area thus outlined was incised deep to adipose tissue with a #15 scalpel blade. The skin margins were undermined to an appropriate distance in all directions utilizing iris scissors.  The opposing advancement arms were then advanced into place in opposite direction and anchored with interrupted buried subcutaneous sutures.
Hemigard Postcare Instructions: The HEMIGARD strips are to remain completely dry for at least 5-7 days.
Cheiloplasty (Complex) Text: A decision was made to reconstruct the defect with a  cheiloplasty.  The defect was undermined extensively.  Additional obicularis oris muscle was excised with a 15 blade scalpel.  The defect was converted into a full thickness wedge to facilite a better cosmetic result.  Small vessels were then tied off with 5-0 monocyrl. The obicularis oris, superficial fascia, adipose and dermis were then reapproximated.  After the deeper layers were approximated the epidermis was reapproximated with particular care given to realign the vermilion border.
Island Pedicle Flap Text: The defect edges were debeveled with a #15 scalpel blade.  Given the location of the defect, shape of the defect and the proximity to free margins an island pedicle advancement flap was deemed most appropriate.  Using a sterile surgical marker, an appropriate advancement flap was drawn incorporating the defect, outlining the appropriate donor tissue and placing the expected incisions within the relaxed skin tension lines where possible.    The area thus outlined was incised deep to adipose tissue with a #15 scalpel blade.  The skin margins were undermined to an appropriate distance in all directions around the primary defect and laterally outward around the island pedicle utilizing iris scissors.  There was minimal undermining beneath the pedicle flap.
Consent (Temporal Branch)/Introductory Paragraph: The rationale for Mohs was explained to the patient and consent was obtained. The risks, benefits and alternatives to therapy were discussed in detail. Specifically, the risks of damage to the temporal branch of the facial nerve, infection, scarring, bleeding, prolonged wound healing, incomplete removal, allergy to anesthesia, and recurrence were addressed. Prior to the procedure, the treatment site was clearly identified and confirmed by the patient. All components of Universal Protocol/PAUSE Rule completed.
Unna Boot Text: An Unna boot was placed to help immobilize the limb and facilitate more rapid healing.
Epidermal Closure Graft Donor Site (Optional): running
Nasalis-Muscle-Based Myocutaneous Island Pedicle Flap Text: Using a #15 blade, an incision was made around the donor flap to the level of the nasalis muscle. Wide lateral undermining was then performed in both the subcutaneous plane above the nasalis muscle, and in a submuscular plane just above periosteum. This allowed the formation of a free nasalis muscle axial pedicle (based on the angular artery) which was still attached to the actual cutaneous flap, increasing its mobility and vascular viability. Hemostasis was obtained with pinpoint electrocoagulation. The flap was mobilized into position and the pivotal anchor points positioned and stabilized with buried interrupted sutures. Subcutaneous and dermal tissues were closed in a multilayered fashion with sutures. Tissue redundancies were excised, and the epidermal edges were apposed without significant tension and sutured with sutures.
Estimated Blood Loss (Cc): minimal
Graft Donor Site Dermal Sutures (Optional): 5-0 Polysorb
Hemostasis: Electrocautery
Mauc Instructions: By selecting yes to the question below the MAUC number will be added into the note.  This will be calculated automatically based on the diagnosis chosen, the size entered, the body zone selected (H,M,L) and the specific indications you chose. You will also have the option to override the Mohs AUC if you disagree with the automatically calculated number and this option is found in the Case Summary tab.
O-T Advancement Flap Text: The defect edges were debeveled with a #15 scalpel blade.  Given the location of the defect, shape of the defect and the proximity to free margins an O-T advancement flap was deemed most appropriate.  Using a sterile surgical marker, an appropriate advancement flap was drawn incorporating the defect and placing the expected incisions within the relaxed skin tension lines where possible.    The area thus outlined was incised deep to adipose tissue with a #15 scalpel blade.  The skin margins were undermined to an appropriate distance in all directions utilizing iris scissors.
Epidermal Closure: simple interrupted
Depth Of Tumor Invasion (For Histology): dermis
Consent Type: Consent 1 (Standard)
Vermilion Border Text: The closure involved the vermilion border.
Consent 3/Introductory Paragraph: I gave the patient a chance to ask questions they had about the procedure.  Following this I explained the Mohs procedure and consent was obtained. The risks, benefits and alternatives to therapy were discussed in detail. Specifically, the risks of infection, scarring, bleeding, prolonged wound healing, incomplete removal, allergy to anesthesia, nerve injury and recurrence were addressed. Prior to the procedure, the treatment site was clearly identified and confirmed by the patient. All components of Universal Protocol/PAUSE Rule completed.
V-Y Plasty Text: The defect edges were debeveled with a #15 scalpel blade.  Given the location of the defect, shape of the defect and the proximity to free margins an V-Y advancement flap was deemed most appropriate.  Using a sterile surgical marker, an appropriate advancement flap was drawn incorporating the defect and placing the expected incisions within the relaxed skin tension lines where possible.    The area thus outlined was incised deep to adipose tissue with a #15 scalpel blade.  The skin margins were undermined to an appropriate distance in all directions utilizing iris scissors.
Orbicularis Oris Muscle Flap Text: The defect edges were debeveled with a #15 scalpel blade.  Given that the defect affected the competency of the oral sphincter an orbicularis oris muscle flap was deemed most appropriate to restore this competency and normal muscle function.  Using a sterile surgical marker, an appropriate flap was drawn incorporating the defect. The area thus outlined was incised with a #15 scalpel blade.
Post-Care Instructions: I reviewed with the patient in detail post-care instructions. Patient is not to engage in any heavy lifting, exercise, or swimming for the next 14 days. Should the patient develop any fevers, chills, bleeding, severe pain patient will contact the office immediately.
Paramedian Forehead Flap Text: A decision was made to reconstruct the defect utilizing an interpolation axial flap and a staged reconstruction.  A telfa template was made of the defect.  This telfa template was then used to outline the paramedian forehead pedicle flap.  The donor area for the pedicle flap was then injected with anesthesia.  The flap was excised through the skin and subcutaneous tissue down to the layer of the underlying musculature.  The pedicle flap was carefully excised within this deep plane to maintain its blood supply.  The edges of the donor site were undermined.   The donor site was closed in a primary fashion.  The pedicle was then rotated into position and sutured.  Once the tube was sutured into place, adequate blood supply was confirmed with blanching and refill.  The pedicle was then wrapped with xeroform gauze and dressed appropriately with a telfa and gauze bandage to ensure continued blood supply and protect the attached pedicle.
Where Do You Want The Question To Include Opioid Counseling Located?: Case Summary Tab
O-Z Flap Text: The defect edges were debeveled with a #15 scalpel blade.  Given the location of the defect, shape of the defect and the proximity to free margins an O-Z flap was deemed most appropriate.  Using a sterile surgical marker, an appropriate transposition flap was drawn incorporating the defect and placing the expected incisions within the relaxed skin tension lines where possible. The area thus outlined was incised deep to adipose tissue with a #15 scalpel blade.  The skin margins were undermined to an appropriate distance in all directions utilizing iris scissors.
W Plasty Text: The lesion was extirpated to the level of the fat with a #15 scalpel blade.  Given the location of the defect, shape of the defect and the proximity to free margins a W-plasty was deemed most appropriate for repair.  Using a sterile surgical marker, the appropriate transposition arms of the W-plasty were drawn incorporating the defect and placing the expected incisions within the relaxed skin tension lines where possible.    The area thus outlined was incised deep to adipose tissue with a #15 scalpel blade.  The skin margins were undermined to an appropriate distance in all directions utilizing iris scissors.  The opposing transposition arms were then transposed into place in opposite direction and anchored with interrupted buried subcutaneous sutures.

## 2022-11-29 NOTE — HPI: PROCEDURE (MOHS)
Has The Growth Been Previously Biopsied?: has been previously biopsied
Year Removed: 1983
Location: Upper Back

## 2022-11-30 ENCOUNTER — TELEMEDICINE (OUTPATIENT)
Dept: ENDOCRINOLOGY | Facility: MEDICAL CENTER | Age: 67
End: 2022-11-30
Attending: INTERNAL MEDICINE
Payer: MEDICARE

## 2022-11-30 DIAGNOSIS — R79.89 MACROPROLACTINEMIA: ICD-10-CM

## 2022-11-30 DIAGNOSIS — E55.9 VITAMIN D DEFICIENCY: ICD-10-CM

## 2022-11-30 DIAGNOSIS — E03.8 OTHER SPECIFIED HYPOTHYROIDISM: ICD-10-CM

## 2022-11-30 DIAGNOSIS — E03.9 PRIMARY HYPOTHYROIDISM: ICD-10-CM

## 2022-11-30 DIAGNOSIS — E22.1 HYPERPROLACTINEMIA (HCC): ICD-10-CM

## 2022-11-30 LAB — MISCELLANEOUS LAB RESULT MISCLAB: NORMAL

## 2022-11-30 PROCEDURE — 99214 OFFICE O/P EST MOD 30 MIN: CPT | Mod: 95 | Performed by: INTERNAL MEDICINE

## 2022-11-30 RX ORDER — LEVOTHYROXINE SODIUM 0.07 MG/1
75 TABLET ORAL
Qty: 90 TABLET | Refills: 3 | Status: SHIPPED | OUTPATIENT
Start: 2022-11-30 | End: 2023-01-16

## 2022-11-30 NOTE — PROGRESS NOTES
Chief Complaint: Follow up for Hyperprolactinemia due to a Macroprolactinoma, Hypothyroidism.  Patient was presented for a telehealth consultation via secure and encrypted videoconferencing technology. This encounter was conducted via Zoom . Verbal consent was obtained. Patient's identity was verified.      HPI:     Gerri Castelan is a 66 y.o. female here for follow up of the above medical issue.  She was initially discovered to have a pituitary macroadenoma producing prolactin after she had some memory problems.  Initial MRI showed a 26 mm macroadenoma and her baseline prolactin was 889 on October 2018.    Interestingly she was started on bromocriptine by another endocrinologist instead of cabergoline but she responded to the medication and has tolerated without side effects.  After I saw her I placed on cabergoline to replace bromocriptine as it is superior and more effective        Currently she is on Cabergoline 0.25mg 2 x a week  She denies galactorrhea and headaches and blurring of vision  Her last prolactin was controlled at 2.45 on 11/17/2022      Her most recent pituitary MRI shows that the pituitary tumor that was last seen and 2021 is no longer visible        She has primary hypothyroidism that was diagnosed over 10 years ago and this predates the diagnosis of her prior hyperprolactinemia.    She has remained on levothyroxine 75 MCG daily which has been her dose for at least 6 months to a year  She denies constipation and cold intolerance      Her TSH was normal at 0.5 on July 2022        Patient's medications, allergies, and social histories were reviewed and updated as appropriate.      ROS:     CONS:     No fever, no chills   EYES:     No diplopia, no blurry vision   CV:           No chest pain, no palpitations   PULM:     No SOB, no cough, no hemoptysis.   GI:            No nausea, no vomiting, no diarrhea, no constipation   ENDO:     No polyuria, no polydipsia, no heat intolerance, no cold  intolerance       Past Medical History:  Problem List:  2022-07: Hyperprolactinemia (HCC)  2020-03: History of malignant melanoma  2020-03: History of left breast cancer  2020-03: History of bilateral mastectomy  2020-03: Grade II hemorrhoids  2020-03: Menopausal symptom  2019-05: Acute infective otitis externa, right  2018-10: Incidentally-noted pituitary tumor  2018-09: Pituitary adenoma with extrasellar extension (HCC)  2018-09: Pituitary macroadenoma (HCC)  2018-09: Amnesia  2018-09: Hypothyroidism  2016-12: Elevated fasting glucose  2016-11: Abnormal echocardiogram  2016-10: Family history of early CAD  2016-10: History of TIA (transient ischemic attack)  2016-09: Dyslipidemia  2016-09: Primary hypothyroidism  2016-09: Altered mental status  2016-09: Chronic right shoulder pain  2016-09: Frequent loose stools  2016-03: Macroprolactinemia  2012-12: Enlarged gallbladder  2012-11: Gallbladder polyp  2011-02: Melanoma (HCC)  2010-12: Family history of breast cancer in mother  2009-05: GERD (gastroesophageal reflux disease)  Personal history of breast cancer  Chronic migraine without aura without status migrainosus, not   intractable  Prolactinoma (HCC)    Past Surgical History:  Past Surgical History:   Procedure Laterality Date    ANAIS BY LAPAROSCOPY  12/6/2012    Performed by Yessica Ocampo M.D. at SURGERY [facility]    NIPPLE RECONSTRUCTION  12/1/2011    Performed by PATEL DALTON at [facility]    FLAP GRAFT  12/1/2011    Performed by PATEL DALTON at [facility]    BREAST RECONSTRUCTION  7/15/2011    Performed by PATEL DALTON at [facility]    TISSUE EXPANDER PLACE/REMOVE  7/15/2011    Performed by PATEL DALTON at [facility]    BREAST IMPLANT REVISION  7/15/2011    Performed by PATEL DALTON at [facility] ORS    CAPSULECTOMY  7/15/2011    Performed by PATEL DALTON at [facility]    TISSUE  TRANSFER/REARRANGE  7/15/2011    Performed by PATEL DALTON at SURGERY [facility]    MASTECTOMY  3/29/2011    Performed by HANNAH LEVIN at SURGERY [facility]    BREAST RECONSTRUCTION  3/29/2011    Performed by PATEL DALTON at SURGERY [facility]    TISSUE EXPANDER PLACE/REMOVE  3/29/2011    Performed by PATEL DALTON at SURGERY [facility]    BREAST BIOPSY  2/24/2011    Performed by HANNAH LEVIN at SURGERY [facility] DAY [facility]S    LUMPECTOMY  1/19/2011    Performed by HANNAH LEVIN at SURGERY SAME DAY [facility]S    NODE BIOPSY SENTINEL  1/19/2011    [facility]EVIN [facility]    COLONOSCOPY  2/2005    normal, due 2015    OTHER ORTHOPEDIC SURGERY  1997    knee surgery ACL repair    TUBAL LIGATION  1987    OTHER  1983    malignant melanoma skin cancer upper back        Allergies:  Amoxicillin and Arimidex [anastrozole]     Social History:  Social History     Tobacco Use    Smoking status: Never    Smokeless tobacco: Never   Vaping Use    Vaping Use: Never used   Substance Use Topics    Alcohol use: Not Currently     Comment: occasional - wine or clara    Drug use: No        Family History:   family history includes Cancer (age of onset: 60) in her mother; Dementia in her mother; Diabetes (age of onset: 54) in her brother; Heart Attack in her brother, maternal grandfather, and sister; Heart Disease (age of onset: 52) in her sister; Hyperlipidemia in her mother.      PHYSICAL EXAM:   Vital signs: LMP 10/30/2004   GENERAL: Well-developed, well-nourished in no apparent distress.   EYE:  No ocular asymmetry, PERRLA  HENT: Pink, moist mucous membranes.    NECK: No thyromegaly.   CARDIOVASCULAR:  No murmurs  LUNGS: Clear breath sounds  ABDOMEN: Soft, nontender   EXTREMITIES: No clubbing, cyanosis, or edema.   NEUROLOGICAL: No gross focal motor abnormalities   LYMPH: No cervical adenopathy seen  SKIN: No rashes, lesions.       Labs:  Lab Results    Component Value Date/Time    SODIUM 138 2022 11:42 AM    POTASSIUM 4.1 2022 11:42 AM    CHLORIDE 105 2022 11:42 AM    CO2 23 2022 11:42 AM    ANION 10.0 2022 11:42 AM    GLUCOSE 99 2022 11:42 AM    BUN 18 2022 11:42 AM    CREATININE 0.64 2022 11:42 AM    CALCIUM 10.1 2022 11:42 AM    ASTSGOT 19 2022 11:42 AM    ALTSGPT 20 2022 11:42 AM    TBILIRUBIN 0.5 2022 11:42 AM    ALBUMIN 4.6 2022 11:42 AM    TOTPROTEIN 7.1 2022 11:42 AM    GLOBULIN 2.5 2022 11:42 AM    AGRATIO 1.8 2022 11:42 AM       Lab Results   Component Value Date/Time    SODIUM 140 2022 0710    POTASSIUM 4.6 2022 0710    CHLORIDE 106 2022 0710    CO2 24 2022 0710    GLUCOSE 105 (H) 2022 0710    BUN 20 2022 0710    CREATININE 0.80 2022 0710    CALCIUM 9.8 2022 0710    ANION 10.0 2022 0710       Lab Results   Component Value Date/Time    CHOLSTRLTOT 174 2021 0714    TRIGLYCERIDE 193 (H) 2021 0714    HDL 55 2021 0714    LDL 80 2021 0714       Lab Results   Component Value Date/Time    TSHULTRASEN 0.500 2022 0710     Lab Results   Component Value Date/Time    FREET4 1.36 2022 0710     Lab Results   Component Value Date/Time    FREET3 2.68 2022 0710     No results found for: THYSTIMIG    Lab Results   Component Value Date/Time    MICROSOMALA 0.5 2019 1433         Imagin/4/2022 4:49 PM     HISTORY/REASON FOR EXAM:  Pituitary adenoma, known or suspected        TECHNIQUE/EXAM DESCRIPTION:  MRI of the brain without and with contrast, pituitary protocol.     T1 3D TFE sagittal, T2 Drive Turbo spin-echo axial, and 3D FLAIR coronal images were obtained of the whole brain. Thin-section images of the sella were obtained including: T2 BARRETT coronal, T1 or T1-weighted 3D BARRETT precontrast coronal, T1 or T1-weighted 3D   BARRETT postcontrast coronal, T1 or T1-weighted 3DTSE  precontrast sagittal and T1 or T1-weighted 3D BARRETT postcontrast sagittal images. Optional T1-weighted dynamic contrast-infused coronal BARRETT images may also be obtained.     The study was performed on a [place] 3.0 [place]lia MRI scanner.     15 mL ProHance contrast was administered intravenously.     COMPARISON:  5/12/2021     FINDINGS:  The thin-section images of the sella show the pituitary to be of normal size and configuration. There is uniform enhancement. The pituitary stalk is in the midline. There are no suprasellar or parasellar mass lesions. The cavernous sinuses show normal   enhancement and configuration. Vascular flow voids in the juxtasellar carotid arteries are unremarkable.     A few nonspecific T2 hyperintensities in the subcortical and periventricular white matter. Mild cerebral volume loss is seen. There is no intra-axial space-occupying lesion. The ventricles, cortical sulci and the basal cisterns are prominent consistent   with mild cerebral volume loss. The visualized flow voids of the cerebral vasculature are unremarkable.  There is no large lesion identified in the expected course of the intracranial portions of the cranial nerves.     The skull bones are unremarkable.        IMPRESSION:     1.  There is no evidence of pituitary mass. There is been no significant interval change.  2.  Mild cerebral volume loss.  3.  Mild chronic microvascular ischemic disease.        ASSESSMENT/PLAN:     1. Hyperprolactinemia (HCC)  Controlled  Her recent MRI shows the pituitary tumor is no longer visible  I believe that it is reasonable to conduct a trial of withdrawal therapy of cabergoline to see if she stays stable  She will hold cabergoline for now  We will repeat her labs again in 1 month  I will see her again in 6 months    2. Macroprolactinemia  Patient has a prolactin producing tumor that has responded to medical therapy   Please see plan above      3. Primary hypothyroidism  Controlled  Continue  levothyroxine 75 MCG daily  Repeat TSH levels again in 6 to 12 months    4. Vitamin D deficiency  Stable   Vitamin D labs were reviewed with patient  Continue current supplements  Continue monitoring levels       Return in about 6 months (around 5/30/2023).        Thank you kindly for allowing me to participate in the thyroid care plan for this patient.    Jesus Smith MD, FACE, [facility]  07/29/22    CC:   Davidson Murillo M.D.

## 2022-12-09 ENCOUNTER — OFFICE VISIT (OUTPATIENT)
Dept: URGENT CARE | Facility: PHYSICIAN GROUP | Age: 67
End: 2022-12-09
Payer: MEDICARE

## 2022-12-09 VITALS
BODY MASS INDEX: 26.99 KG/M2 | RESPIRATION RATE: 18 BRPM | WEIGHT: 162 LBS | HEART RATE: 77 BPM | TEMPERATURE: 98.6 F | SYSTOLIC BLOOD PRESSURE: 130 MMHG | HEIGHT: 65 IN | DIASTOLIC BLOOD PRESSURE: 64 MMHG | OXYGEN SATURATION: 97 %

## 2022-12-09 DIAGNOSIS — H53.9 VISION CHANGES: ICD-10-CM

## 2022-12-09 PROCEDURE — 99215 OFFICE O/P EST HI 40 MIN: CPT

## 2022-12-09 ASSESSMENT — ENCOUNTER SYMPTOMS
DOUBLE VISION: 0
BLURRED VISION: 1
EYE DISCHARGE: 0
PHOTOPHOBIA: 0
HEADACHES: 0
EYE REDNESS: 0
EYE PAIN: 0

## 2022-12-09 ASSESSMENT — FIBROSIS 4 INDEX: FIB4 SCORE: 1.45

## 2022-12-09 NOTE — PROGRESS NOTES
"Subjective:   Gerri Castelan is a 67 y.o. female who presents for Blurred Vision (This afternoon. Vision is slightly blurred. Pt. States she is seeing dots and lines.)      HPI: This is a 67-year-old female who presents today for vision problem.  This is a new problem.  Patient describes floaters and blurred vision with sudden onset to left eye at approximately 1330 today.  She reports experiencing \"dots moving around in her eye and zigzag lines\".  She reports working on possible when incident occurred.  She denies pain to left eye.  She has not applied anything to her left eye.  She does wear glasses.  She has history of cataract surgery and pituitary gland.  She denies any additional neurological deficits.      Review of Systems   Eyes:  Positive for blurred vision. Negative for double vision, photophobia, pain, discharge and redness.        + Floaters   Neurological:  Negative for headaches.     Medications:    Current Outpatient Medications on File Prior to Visit   Medication Sig Dispense Refill    levothyroxine (SYNTHROID) 75 MCG Tab Take 1 Tablet by mouth every morning on an empty stomach. 90 Tablet 3    atorvastatin (LIPITOR) 20 MG Tab TAKE 1 TABLET DAILY AT BEDTIME 90 Tablet 3    colestipol (COLESTID) 1 GM Tab       pantoprazole (PROTONIX) 40 MG Tablet Delayed Response TAKE 1 TABLET BY MOUTH EVERY DAY 30 MINUTES BEFORE BREAKFAST MEAL      Cyanocobalamin (VITAMIN B-12) 5000 MCG SL Tab Place  under tongue.      VITAMIN D PO Take 5,000 Units by mouth every day.      cabergoline (DOSTINEX) 0.5 MG tablet Take 0.5 Tablets by mouth two times a week. (Patient not taking: Reported on 12/9/2022) 12 Tablet 3    Ferrous Gluconate-C-Folic Acid (IRON-C PO) Take 25 mg by mouth every 72 hours. (Patient not taking: Reported on 12/9/2022)      valacyclovir (VALTREX) 1 GM Tab Take 1 Tab by mouth 2 times a day. (Patient not taking: Reported on 12/9/2022) 20 Tab 3     No current facility-administered medications on file " prior to visit.        Allergies:   Amoxicillin and Arimidex [anastrozole]    Problem List:   Patient Active Problem List   Diagnosis    GERD (gastroesophageal reflux disease)    Family history of breast cancer in mother    Personal history of breast cancer    Macroprolactinemia    Chronic right shoulder pain    Frequent loose stools    Dyslipidemia    Primary hypothyroidism    Family history of early CAD    Elevated fasting glucose    Pituitary macroadenoma (HCC)    Pituitary adenoma with extrasellar extension (HCC)    Incidentally-noted pituitary tumor    Prolactinoma (HCC)    History of malignant melanoma    History of left breast cancer    History of bilateral mastectomy    Grade II hemorrhoids    Menopausal symptom    Hyperprolactinemia (HCC)        Surgical History:  Past Surgical History:   Procedure Laterality Date    ANAIS BY LAPAROSCOPY  12/6/2012    Performed by Yessica Levni M.D. at SURGERY [facility]    NIPPLE RECONSTRUCTION  12/1/2011    Performed by PATEL DALTON at SURGERY [facility]    FLAP GRAFT  12/1/2011    Performed by PATEL DALTON at SURGERY [facility]    BREAST RECONSTRUCTION  7/15/2011    Performed by PATEL DALTON at SURGERY [facility]    TISSUE EXPANDER PLACE/REMOVE  7/15/2011    Performed by PATEL DALTON at SURGERY [facility]    BREAST IMPLANT REVISION  7/15/2011    Performed by PATEL DALTON at SURGERY [facility]    CAPSULECTOMY  7/15/2011    Performed by PATEL DALTON at SURGERY [facility]    TISSUE TRANSFER/REARRANGE  7/15/2011    Performed by PATEL DALTON at SURGERY [facility]    MASTECTOMY  3/29/2011    Performed by YESSICA LEVIN at SURGERY [facility]    BREAST RECONSTRUCTION  3/29/2011    Performed by PATEL DALTON at SURGERY [facility]    TISSUE EXPANDER PLACE/REMOVE  3/29/2011    Performed by PATEL DALTON at SURGERY [facility]    BREAST BIOPSY  2/24/2011    Performed by OLLIE  "HANNAH ARANGO at SURGERY SAME DAY [facility]    LUMPECTOMY  1/19/2011    Performed by HANNAH LEVIN at SURGERY SAME DAY [facility]    NODE BIOPSY SENTINEL  1/19/2011    Performed by HANNAH LEVIN at SURGERY SAME DAY [facility]    COLONOSCOPY  2/2005    normal, due 2015    OTHER ORTHOPEDIC SURGERY  1997    knee surgery ACL repair    TUBAL LIGATION  1987    OTHER  1983    malignant melanoma skin cancer upper back       Past Social Hx:   Social History     Tobacco Use    Smoking status: Never    Smokeless tobacco: Never   Vaping Use    Vaping Use: Never used   Substance Use Topics    Alcohol use: Not Currently     Comment: occasional - wine or clara    Drug use: No          Problem list, medications, and allergies reviewed by myself today in Epic.     Objective:     /64   Pulse 77   Temp 37 °C (98.6 °F) (Temporal)   Resp 18   Ht 1.651 m (5' 5\")   Wt 73.5 kg (162 lb)   LMP 10/30/2004   SpO2 97%   BMI 26.96 kg/m²     Physical Exam  Vitals and nursing note reviewed.   Constitutional:       General: She is not in acute distress.     Appearance: Normal appearance. She is normal weight. She is not ill-appearing, toxic-appearing or diaphoretic.   HENT:      Head: Normocephalic and atraumatic.   Eyes:      Extraocular Movements: Extraocular movements intact.      Right eye: Normal extraocular motion and no nystagmus.      Left eye: Normal extraocular motion and no nystagmus.      Pupils: Pupils are unequal.      Visual Fields: Right eye visual fields normal and left eye visual fields normal.      Comments: Right eye pupil 3mm. Left eye pupil 4mm. Bilat +reactive to light   Skin:     General: Skin is warm and dry.      Capillary Refill: Capillary refill takes less than 2 seconds.   Neurological:      General: No focal deficit present.      Mental Status: She is alert and oriented to person, place, and time. Mental status is at baseline.   Psychiatric:         Mood and Affect: Mood normal.         Behavior: " Behavior normal.         Thought Content: Thought content normal.         Judgment: Judgment normal.       Assessment/Plan:     Diagnosis and associated orders:   1. Vision changes                  Acute problem.  Given patient's acute vision changes with floaters and unequal pupils, I am advising patient to go to emergency room for further evaluation as retinal detachment or any other ocular emergencies cannot be ruled out in urgent care.  Patient is stable and in no acute distress.  Her  is at side and is in agreement with this.  They report they will go down the street to [facility] emergency department for further evaluation and management, and declined EMS transfer at this time           Please note that this dictation was created using voice recognition software. I have made a reasonable attempt to correct obvious errors, but I expect that there are errors of grammar and possibly content that I did not discover before finalizing the note.    This note was electronically signed by KYLIE Denis

## 2022-12-13 ENCOUNTER — APPOINTMENT (RX ONLY)
Dept: URBAN - METROPOLITAN AREA CLINIC 22 | Facility: CLINIC | Age: 67
Setting detail: DERMATOLOGY
End: 2022-12-13

## 2022-12-13 DIAGNOSIS — Z48.02 ENCOUNTER FOR REMOVAL OF SUTURES: ICD-10-CM

## 2022-12-13 PROCEDURE — ? SUTURE REMOVAL (GLOBAL PERIOD)

## 2022-12-13 ASSESSMENT — LOCATION SIMPLE DESCRIPTION DERM: LOCATION SIMPLE: LEFT NOSE

## 2022-12-13 ASSESSMENT — LOCATION ZONE DERM: LOCATION ZONE: NOSE

## 2022-12-13 ASSESSMENT — LOCATION DETAILED DESCRIPTION DERM: LOCATION DETAILED: LEFT NARIS

## 2022-12-13 NOTE — PROCEDURE: SUTURE REMOVAL (GLOBAL PERIOD)
Detail Level: Detailed
Add 72862 Cpt? (Important Note: In 2017 The Use Of 57916 Is Being Tracked By Cms To Determine Future Global Period Reimbursement For Global Periods): no

## 2023-01-04 ENCOUNTER — HOSPITAL ENCOUNTER (OUTPATIENT)
Dept: LAB | Facility: MEDICAL CENTER | Age: 68
End: 2023-01-04
Attending: INTERNAL MEDICINE
Payer: MEDICARE

## 2023-01-04 DIAGNOSIS — E22.1 HYPERPROLACTINEMIA (HCC): ICD-10-CM

## 2023-01-04 LAB — PROLACTIN SERPL-MCNC: 7.79 NG/ML (ref 2.8–26)

## 2023-01-04 PROCEDURE — 36415 COLL VENOUS BLD VENIPUNCTURE: CPT

## 2023-01-04 PROCEDURE — 84146 ASSAY OF PROLACTIN: CPT

## 2023-01-15 DIAGNOSIS — E03.9 PRIMARY HYPOTHYROIDISM: ICD-10-CM

## 2023-01-16 RX ORDER — LEVOTHYROXINE SODIUM 0.07 MG/1
TABLET ORAL
Qty: 90 TABLET | Refills: 3 | Status: SHIPPED | OUTPATIENT
Start: 2023-01-16 | End: 2023-06-09

## 2023-02-10 ENCOUNTER — OFFICE VISIT (OUTPATIENT)
Dept: MEDICAL GROUP | Facility: MEDICAL CENTER | Age: 68
End: 2023-02-10
Payer: MEDICARE

## 2023-02-10 VITALS
SYSTOLIC BLOOD PRESSURE: 126 MMHG | DIASTOLIC BLOOD PRESSURE: 72 MMHG | OXYGEN SATURATION: 95 % | HEIGHT: 65 IN | TEMPERATURE: 97.9 F | BODY MASS INDEX: 27.86 KG/M2 | WEIGHT: 167.2 LBS | HEART RATE: 77 BPM

## 2023-02-10 DIAGNOSIS — N95.1 VASOMOTOR SYMPTOMS DUE TO MENOPAUSE: ICD-10-CM

## 2023-02-10 DIAGNOSIS — Z85.828 HISTORY OF BASAL CELL CARCINOMA (BCC) OF SKIN: ICD-10-CM

## 2023-02-10 DIAGNOSIS — R73.01 ELEVATED FASTING GLUCOSE: ICD-10-CM

## 2023-02-10 DIAGNOSIS — E78.5 DYSLIPIDEMIA: ICD-10-CM

## 2023-02-10 DIAGNOSIS — Z86.018 HISTORY OF PITUITARY ADENOMA: ICD-10-CM

## 2023-02-10 DIAGNOSIS — R42 VERTIGO: ICD-10-CM

## 2023-02-10 DIAGNOSIS — K21.9 GASTROESOPHAGEAL REFLUX DISEASE WITHOUT ESOPHAGITIS: ICD-10-CM

## 2023-02-10 PROBLEM — D49.7: Status: RESOLVED | Noted: 2018-10-07 | Resolved: 2023-02-10

## 2023-02-10 PROBLEM — D35.2 PITUITARY ADENOMA WITH EXTRASELLAR EXTENSION (HCC): Status: RESOLVED | Noted: 2018-09-24 | Resolved: 2023-02-10

## 2023-02-10 PROBLEM — D35.2 PITUITARY MACROADENOMA (HCC): Status: RESOLVED | Noted: 2018-09-14 | Resolved: 2023-02-10

## 2023-02-10 PROCEDURE — 99214 OFFICE O/P EST MOD 30 MIN: CPT | Performed by: FAMILY MEDICINE

## 2023-02-10 RX ORDER — SUCRALFATE 1 G/1
TABLET ORAL
COMMUNITY
Start: 2023-01-18

## 2023-02-10 ASSESSMENT — PATIENT HEALTH QUESTIONNAIRE - PHQ9: CLINICAL INTERPRETATION OF PHQ2 SCORE: 0

## 2023-02-10 ASSESSMENT — FIBROSIS 4 INDEX: FIB4 SCORE: 1.45

## 2023-02-10 NOTE — PROGRESS NOTES
Chief Complaint   Patient presents with    Dizziness     Pt reports dizziness upon waking. Pt states she will wake up dizzy and nauseous. Eating first thing in the morning has helped. Sx x3m.        Subjective:     HPI:   Gerri Castelan presents today with the followin. Vertigo  Patient has had episodic vertigo over the last 3 months.  This is typically early in the day and seems to resolve most of the rest of the day.  She has not tried any particular medication.  Eating does seem to help.  Patient's blood pressure is excellent here today.  Denies any palpitations or rapid heartbeat.  She did have a similar episode several years ago that was very well treated by her ENT.  Referral is placed.    2. Vasomotor symptoms due to menopause  Patient continues to have vasomotor symptoms at times.  This may still be menopausal though this has been going on for a long time.  Patient is not on any estrogen replacement and has not been for quite a while.  Denies soaking sweats.    3. Dyslipidemia  Patient denies chest pain, chest pressure, palpitations or exertional shortness of breath. Patient denies muscle aches or muscle weakness from the atorvastatin medication. Patient is a never smoker. Patient takes no aspirin daily. Patient has no history of myocardial infarction, stroke or PVD.  Lab orders discussed and placed    4. Elevated fasting glucose  Review of her previous test shows that she has mild elevated fasting glucose.  A1c order and repeat labs discussed and placed.    5. Gastroesophageal reflux disease without esophagitis  Patient continues her pantoprazole regimen daily.  She occasionally takes sucralfate if she is having significant reflux and finds that it is very helpful.  Denies dysphagia or vomiting.    6. History of basal cell carcinoma (BCC) of skin  Patient recently had a basal cell carcinoma removed from the upper lip right at the base of the nose.  She underwent Mohs surgery and states she  was told the got all the cells on the first try.  The result is excellent, very difficult to see.    7. History of pituitary adenoma  Patient had repeat evaluation with neurosurgery.  Her pituitary adenoma appears to have resolved.  She will continue surveillance but has now been off the bromocriptine and related medications for a couple months.        Patient Active Problem List    Diagnosis Date Noted    History of basal cell carcinoma (BCC) of skin 02/10/2023    History of pituitary adenoma 02/10/2023    Vasomotor symptoms due to menopause 02/10/2023    Hyperprolactinemia (HCC) 07/29/2022    History of malignant melanoma 03/09/2020    History of left breast cancer 03/09/2020    History of bilateral mastectomy 03/09/2020    Grade II hemorrhoids 03/09/2020    Menopausal symptom 03/09/2020    Elevated fasting glucose 12/30/2016    Family history of early CAD 10/17/2016    Dyslipidemia 09/26/2016    Primary hypothyroidism 09/26/2016    Chronic right shoulder pain 09/22/2016    Macroprolactinemia 03/22/2016    Personal history of breast cancer     Family history of breast cancer in mother 12/01/2010    GERD (gastroesophageal reflux disease) 05/19/2009       Current medicines (including changes today)  Current Outpatient Medications   Medication Sig Dispense Refill    sucralfate (CARAFATE) 1 GM Tab DISSOLVE 1 TABLET IN 10-15 ML OF WATER AND TAKE FOUR TIMES DAILY IMMEDIATELY AFTER MEALS AND AT BEDTIME      levothyroxine (SYNTHROID) 75 MCG Tab TAKE 1 TABLET EVERY MORNING ON AN EMPTY STOMACH 90 Tablet 3    atorvastatin (LIPITOR) 20 MG Tab TAKE 1 TABLET DAILY AT BEDTIME 90 Tablet 3    pantoprazole (PROTONIX) 40 MG Tablet Delayed Response TAKE 1 TABLET BY MOUTH EVERY DAY 30 MINUTES BEFORE BREAKFAST MEAL      Cyanocobalamin (VITAMIN B-12) 5000 MCG SL Tab Place  under tongue.      VITAMIN D PO Take 5,000 Units by mouth every day.       No current facility-administered medications for this visit.       Allergies   Allergen  "Reactions    Amoxicillin      Headache      Arimidex [Anastrozole] Swelling       ROS: As per HPI       Objective:     /72 (BP Location: Right arm, Patient Position: Sitting, BP Cuff Size: Small adult)   Pulse 77   Temp 36.6 °C (97.9 °F) (Temporal)   Ht 1.651 m (5' 5\")   Wt 75.8 kg (167 lb 3.2 oz)   SpO2 95%  Body mass index is 27.82 kg/m².    Physical Exam:  Constitutional: Well-developed and well-nourished. Not diaphoretic. No distress. Lucid and fluent.  Patient, physician and staff all wearing masks.  Mask removed briefly so I could look at the base of the nose where the cancer was removed.  Excellently well-healed.  Skin: Skin is warm and dry. No rash noted.  Head: Atraumatic without lesions.  Eyes: Conjunctivae and extraocular motions are normal. Pupils are equal, round, and reactive to light. No scleral icterus.   Ears:  External ears unremarkable.  Neck: Supple, trachea midline. No thyromegaly present. No cervical or supraclavicular lymphadenopathy. No JVD or carotid bruits appreciated  Cardiovascular: Regular rate and rhythm.  Normal S1, S2 without murmur appreciated.  Chest: Effort normal. Clear to auscultation throughout. No adventitious sounds.   Abdomen: Soft, non tender, and without distention. Active bowel sounds in all four quadrants. No rebound, guarding, masses or hepatosplenomegaly.  Extremities: No cyanosis, clubbing, erythema, nor edema.   Neurological: Alert and oriented x 3.  Psychiatric:  Behavior, mood, and affect are appropriate.       Assessment and Plan:     67 y.o. female with the following issues:    1. Vertigo  Referral to ENT      2. Vasomotor symptoms due to menopause        3. Dyslipidemia  Comp Metabolic Panel    Lipid Profile      4. Elevated fasting glucose  Comp Metabolic Panel    HEMOGLOBIN A1C      5. Gastroesophageal reflux disease without esophagitis  CBC WITHOUT DIFFERENTIAL      6. History of basal cell carcinoma (BCC) of skin        7. History of pituitary " adenoma              Followup: Return in about 6 months (around 8/10/2023), or if symptoms worsen or fail to improve.

## 2023-02-11 RX ORDER — MECLIZINE HYDROCHLORIDE 25 MG/1
25 TABLET ORAL 3 TIMES DAILY PRN
Qty: 30 TABLET | Refills: 0 | Status: SHIPPED | OUTPATIENT
Start: 2023-02-11

## 2023-02-15 ENCOUNTER — HOSPITAL ENCOUNTER (OUTPATIENT)
Dept: LAB | Facility: MEDICAL CENTER | Age: 68
End: 2023-02-15
Attending: FAMILY MEDICINE
Payer: MEDICARE

## 2023-02-15 DIAGNOSIS — K21.9 GASTROESOPHAGEAL REFLUX DISEASE WITHOUT ESOPHAGITIS: ICD-10-CM

## 2023-02-15 DIAGNOSIS — E78.5 DYSLIPIDEMIA: ICD-10-CM

## 2023-02-15 DIAGNOSIS — R73.01 ELEVATED FASTING GLUCOSE: ICD-10-CM

## 2023-02-15 LAB
ALBUMIN SERPL BCP-MCNC: 4.1 G/DL (ref 3.2–4.9)
ALBUMIN/GLOB SERPL: 1.8 G/DL
ALP SERPL-CCNC: 78 U/L (ref 30–99)
ALT SERPL-CCNC: 13 U/L (ref 2–50)
ANION GAP SERPL CALC-SCNC: 9 MMOL/L (ref 7–16)
AST SERPL-CCNC: 13 U/L (ref 12–45)
BILIRUB SERPL-MCNC: 0.3 MG/DL (ref 0.1–1.5)
BUN SERPL-MCNC: 23 MG/DL (ref 8–22)
CALCIUM ALBUM COR SERPL-MCNC: 9.2 MG/DL (ref 8.5–10.5)
CALCIUM SERPL-MCNC: 9.3 MG/DL (ref 8.5–10.5)
CHLORIDE SERPL-SCNC: 110 MMOL/L (ref 96–112)
CHOLEST SERPL-MCNC: 151 MG/DL (ref 100–199)
CO2 SERPL-SCNC: 22 MMOL/L (ref 20–33)
CREAT SERPL-MCNC: 0.69 MG/DL (ref 0.5–1.4)
ERYTHROCYTE [DISTWIDTH] IN BLOOD BY AUTOMATED COUNT: 51.6 FL (ref 35.9–50)
EST. AVERAGE GLUCOSE BLD GHB EST-MCNC: 117 MG/DL
FASTING STATUS PATIENT QL REPORTED: NORMAL
GFR SERPLBLD CREATININE-BSD FMLA CKD-EPI: 94 ML/MIN/1.73 M 2
GLOBULIN SER CALC-MCNC: 2.3 G/DL (ref 1.9–3.5)
GLUCOSE SERPL-MCNC: 107 MG/DL (ref 65–99)
HBA1C MFR BLD: 5.7 % (ref 4–5.6)
HCT VFR BLD AUTO: 39.2 % (ref 37–47)
HDLC SERPL-MCNC: 58 MG/DL
HGB BLD-MCNC: 12.5 G/DL (ref 12–16)
LDLC SERPL CALC-MCNC: 79 MG/DL
MCH RBC QN AUTO: 27.7 PG (ref 27–33)
MCHC RBC AUTO-ENTMCNC: 31.9 G/DL (ref 33.6–35)
MCV RBC AUTO: 86.7 FL (ref 81.4–97.8)
PLATELET # BLD AUTO: 204 K/UL (ref 164–446)
PMV BLD AUTO: 11.6 FL (ref 9–12.9)
POTASSIUM SERPL-SCNC: 4.2 MMOL/L (ref 3.6–5.5)
PROT SERPL-MCNC: 6.4 G/DL (ref 6–8.2)
RBC # BLD AUTO: 4.52 M/UL (ref 4.2–5.4)
SODIUM SERPL-SCNC: 141 MMOL/L (ref 135–145)
TRIGL SERPL-MCNC: 71 MG/DL (ref 0–149)
WBC # BLD AUTO: 4.7 K/UL (ref 4.8–10.8)

## 2023-02-15 PROCEDURE — 36415 COLL VENOUS BLD VENIPUNCTURE: CPT

## 2023-02-15 PROCEDURE — 85027 COMPLETE CBC AUTOMATED: CPT

## 2023-02-15 PROCEDURE — 80053 COMPREHEN METABOLIC PANEL: CPT

## 2023-02-15 PROCEDURE — 83036 HEMOGLOBIN GLYCOSYLATED A1C: CPT

## 2023-02-15 PROCEDURE — 80061 LIPID PANEL: CPT

## 2023-03-20 ENCOUNTER — TELEPHONE (OUTPATIENT)
Dept: MEDICAL GROUP | Facility: MEDICAL CENTER | Age: 68
End: 2023-03-20
Payer: MEDICARE

## 2023-03-20 NOTE — TELEPHONE ENCOUNTER
Pt LVM stating her BP was elevated this morning, along with having chest pain. I called the pt back, and she was seen in an ED. Pt states she was referred to Cardiology.

## 2023-03-29 ENCOUNTER — TELEPHONE (OUTPATIENT)
Dept: HEALTH INFORMATION MANAGEMENT | Facility: OTHER | Age: 68
End: 2023-03-29
Payer: MEDICARE

## 2023-04-03 ENCOUNTER — HOSPITAL ENCOUNTER (OUTPATIENT)
Facility: MEDICAL CENTER | Age: 68
End: 2023-04-03
Attending: STUDENT IN AN ORGANIZED HEALTH CARE EDUCATION/TRAINING PROGRAM
Payer: MEDICARE

## 2023-04-03 ENCOUNTER — OFFICE VISIT (OUTPATIENT)
Dept: MEDICAL GROUP | Facility: MEDICAL CENTER | Age: 68
End: 2023-04-03
Payer: MEDICARE

## 2023-04-03 VITALS
RESPIRATION RATE: 16 BRPM | BODY MASS INDEX: 27.73 KG/M2 | TEMPERATURE: 97.8 F | WEIGHT: 166.45 LBS | DIASTOLIC BLOOD PRESSURE: 70 MMHG | SYSTOLIC BLOOD PRESSURE: 130 MMHG | HEART RATE: 76 BPM | HEIGHT: 65 IN | OXYGEN SATURATION: 95 %

## 2023-04-03 DIAGNOSIS — K64.1 GRADE II HEMORRHOIDS: ICD-10-CM

## 2023-04-03 DIAGNOSIS — R30.0 DYSURIA: ICD-10-CM

## 2023-04-03 PROCEDURE — 99213 OFFICE O/P EST LOW 20 MIN: CPT | Performed by: STUDENT IN AN ORGANIZED HEALTH CARE EDUCATION/TRAINING PROGRAM

## 2023-04-03 PROCEDURE — 87186 SC STD MICRODIL/AGAR DIL: CPT

## 2023-04-03 PROCEDURE — 87086 URINE CULTURE/COLONY COUNT: CPT

## 2023-04-03 RX ORDER — HYDROCORTISONE ACETATE 25 MG/1
25 SUPPOSITORY RECTAL EVERY 12 HOURS
Qty: 12 SUPPOSITORY | Refills: 0 | Status: SHIPPED | OUTPATIENT
Start: 2023-04-03 | End: 2023-06-05 | Stop reason: SDUPTHER

## 2023-04-03 RX ORDER — SULFAMETHOXAZOLE AND TRIMETHOPRIM 800; 160 MG/1; MG/1
1 TABLET ORAL EVERY 12 HOURS
Qty: 6 TABLET | Refills: 0 | Status: SHIPPED | OUTPATIENT
Start: 2023-04-03 | End: 2023-04-06

## 2023-04-03 ASSESSMENT — FIBROSIS 4 INDEX: FIB4 SCORE: 1.201850425154663098

## 2023-04-03 NOTE — PROGRESS NOTES
"Chief Complaint   Patient presents with    Blood in Urine    Urinary Frequency     Couple days        HPI:  Symptom onset: 3 days ago   Current symptoms: Painful, urgent, frequent voids.  Noting blood in urine.  Patient notes some pelvic discomfort.  Since onset symptoms are: Unchanged  Treatments tried: none  Associated symptoms: Negative for fever, flank pain, nausea and vomiting, vaginal discharge.  History is negative for frequent UTI.     Patient's history significant for recently getting a Bidet toilet and recently started on melatonin.  Patient notes no other medication changes.  Patient notes no other soap or lubricant changes.    Hemorrhoids  Patient also presents today for concern about hemorrhoids.  Patient notes that she has had chronic problems with her bowels, alternating between constipation and diarrhea.  Patient notes that hemorrhoids have been a chronic problem, grade 2 hemorrhoids noted on diagnosis list.  Patient notes that they come and go but currently causing significant pain and discomfort.      ROS:  Denies fever, chills, vomiting or abdominal pain.     OBJECTIVE:  /70 (BP Location: Right arm, Patient Position: Sitting, BP Cuff Size: Adult)   Pulse 76   Temp 36.6 °C (97.8 °F) (Temporal)   Resp 16   Ht 1.651 m (5' 5\")   Wt 75.5 kg (166 lb 7.2 oz)   SpO2 95%   Gen: Alert, NAD.  Chest: Lungs clear to auscultation, CV RRR.  Abdomen: Soft, tender in suprapubic region. No CVAT. Normal bowel sounds.     No results found for: POCCOLOR, POCAPPEAR, POCLEUKEST, POCNITRITE, POCUROBILIGE, POCPROTEIN, POCURPH, POCBLOOD, POCSPGRV, POCKETONES, POCBILIRUBIN, POCGLUCUA       ASSESSMENT/PLAN:     1. Dysuria  - POCT Urinalysis  - sulfamethoxazole-trimethoprim (BACTRIM DS) 800-160 MG tablet; Take 1 Tablet by mouth every 12 hours for 3 days.  Dispense: 6 Tablet; Refill: 0  - URINE CULTURE(NEW); Future    2. Grade II hemorrhoids  - hydrocortisone (ANUSOL-HC) 25 MG Suppos; Insert 1 Suppository into the " rectum every 12 hours.  Dispense: 12 Suppository; Refill: 0      1. Abnormal urine dipstick in office. Urine sent for culture. Start antibiotics.  2. Provided education to drink plenty of fluids, wipe front to back every void and bowel movement.   3. Return to clinic if symptoms not improving within 3-4 days or in case of vomiting, fever, increasing pain.

## 2023-04-03 NOTE — PATIENT INSTRUCTIONS
Urinary Tract Infection, Adult  A urinary tract infection (UTI) is an infection of any part of the urinary tract. The urinary tract includes:  The kidneys.  The ureters.  The bladder.  The urethra.  These organs make, store, and get rid of pee (urine) in the body.  What are the causes?  This is caused by germs (bacteria) in your genital area. These germs grow and cause swelling (inflammation) of your urinary tract.  What increases the risk?  You are more likely to develop this condition if:  You have a small, thin tube (catheter) to drain pee.  You cannot control when you pee or poop (incontinence).  You are female, and:  You use these methods to prevent pregnancy:  A medicine that kills sperm (spermicide).  A device that blocks sperm (diaphragm).  You have low levels of a female hormone (estrogen).  You are pregnant.  You have genes that add to your risk.  You are sexually active.  You take antibiotic medicines.  You have trouble peeing because of:  A prostate that is bigger than normal, if you are male.  A blockage in the part of your body that drains pee from the bladder (urethra).  A kidney stone.  A nerve condition that affects your bladder (neurogenic bladder).  Not getting enough to drink.  Not peeing often enough.  You have other conditions, such as:  Diabetes.  A weak disease-fighting system (immune system).  Sickle cell disease.  Gout.  Injury of the spine.  What are the signs or symptoms?  Symptoms of this condition include:  Needing to pee right away (urgently).  Peeing often.  Peeing small amounts often.  Pain or burning when peeing.  Blood in the pee.  Pee that smells bad or not like normal.  Trouble peeing.  Pee that is cloudy.  Fluid coming from the vagina, if you are female.  Pain in the belly or lower back.  Other symptoms include:  Throwing up (vomiting).  No urge to eat.  Feeling mixed up (confused).  Being tired and grouchy (irritable).  A fever.  Watery poop (diarrhea).  How is this  treated?  This condition may be treated with:  Antibiotic medicine.  Other medicines.  Drinking enough water.  Follow these instructions at home:    Medicines  Take over-the-counter and prescription medicines only as told by your doctor.  If you were prescribed an antibiotic medicine, take it as told by your doctor. Do not stop taking it even if you start to feel better.  General instructions  Make sure you:  Pee until your bladder is empty.  Do not hold pee for a long time.  Empty your bladder after sex.  Wipe from front to back after pooping if you are a female. Use each tissue one time when you wipe.  Drink enough fluid to keep your pee pale yellow.  Keep all follow-up visits as told by your doctor. This is important.  Contact a doctor if:  You do not get better after 1-2 days.  Your symptoms go away and then come back.  Get help right away if:  You have very bad back pain.  You have very bad pain in your lower belly.  You have a fever.  You are sick to your stomach (nauseous).  You are throwing up.  Summary  A urinary tract infection (UTI) is an infection of any part of the urinary tract.  This condition is caused by germs in your genital area.  There are many risk factors for a UTI. These include having a small, thin tube to drain pee and not being able to control when you pee or poop.  Treatment includes antibiotic medicines for germs.  Drink enough fluid to keep your pee pale yellow.  This information is not intended to replace advice given to you by your health care provider. Make sure you discuss any questions you have with your health care provider.  Document Released: 06/05/2009 Document Revised: 12/05/2019 Document Reviewed: 06/27/2019  [employer] Patient Education © 2020 [employer]

## 2023-04-06 LAB
BACTERIA UR CULT: ABNORMAL
BACTERIA UR CULT: ABNORMAL
SIGNIFICANT IND 70042: ABNORMAL
SITE SITE: ABNORMAL
SOURCE SOURCE: ABNORMAL

## 2023-05-26 ENCOUNTER — HOSPITAL ENCOUNTER (OUTPATIENT)
Dept: LAB | Facility: MEDICAL CENTER | Age: 68
End: 2023-05-26
Attending: INTERNAL MEDICINE
Payer: MEDICARE

## 2023-05-26 DIAGNOSIS — E03.9 PRIMARY HYPOTHYROIDISM: ICD-10-CM

## 2023-05-26 DIAGNOSIS — E22.1 HYPERPROLACTINEMIA (HCC): ICD-10-CM

## 2023-05-26 DIAGNOSIS — R79.89 MACROPROLACTINEMIA: ICD-10-CM

## 2023-05-26 DIAGNOSIS — E55.9 VITAMIN D DEFICIENCY: ICD-10-CM

## 2023-05-26 LAB
25(OH)D3 SERPL-MCNC: 49 NG/ML (ref 30–100)
ALBUMIN SERPL BCP-MCNC: 4.1 G/DL (ref 3.2–4.9)
ALBUMIN/GLOB SERPL: 1.7 G/DL
ALP SERPL-CCNC: 82 U/L (ref 30–99)
ALT SERPL-CCNC: 22 U/L (ref 2–50)
ANION GAP SERPL CALC-SCNC: 10 MMOL/L (ref 7–16)
AST SERPL-CCNC: 20 U/L (ref 12–45)
BILIRUB SERPL-MCNC: 0.4 MG/DL (ref 0.1–1.5)
BUN SERPL-MCNC: 23 MG/DL (ref 8–22)
CALCIUM ALBUM COR SERPL-MCNC: 9.4 MG/DL (ref 8.5–10.5)
CALCIUM SERPL-MCNC: 9.5 MG/DL (ref 8.5–10.5)
CHLORIDE SERPL-SCNC: 109 MMOL/L (ref 96–112)
CO2 SERPL-SCNC: 23 MMOL/L (ref 20–33)
CREAT SERPL-MCNC: 0.76 MG/DL (ref 0.5–1.4)
GFR SERPLBLD CREATININE-BSD FMLA CKD-EPI: 85 ML/MIN/1.73 M 2
GLOBULIN SER CALC-MCNC: 2.4 G/DL (ref 1.9–3.5)
GLUCOSE SERPL-MCNC: 106 MG/DL (ref 65–99)
POTASSIUM SERPL-SCNC: 4.4 MMOL/L (ref 3.6–5.5)
PROLACTIN SERPL-MCNC: 17.2 NG/ML (ref 2.8–26)
PROT SERPL-MCNC: 6.5 G/DL (ref 6–8.2)
SODIUM SERPL-SCNC: 142 MMOL/L (ref 135–145)
T4 FREE SERPL-MCNC: 1.53 NG/DL (ref 0.93–1.7)
TSH SERPL DL<=0.005 MIU/L-ACNC: 0.17 UIU/ML (ref 0.38–5.33)

## 2023-05-26 PROCEDURE — 84146 ASSAY OF PROLACTIN: CPT

## 2023-05-26 PROCEDURE — 84443 ASSAY THYROID STIM HORMONE: CPT

## 2023-05-26 PROCEDURE — 82306 VITAMIN D 25 HYDROXY: CPT

## 2023-05-26 PROCEDURE — 84305 ASSAY OF SOMATOMEDIN: CPT

## 2023-05-26 PROCEDURE — 80053 COMPREHEN METABOLIC PANEL: CPT

## 2023-05-26 PROCEDURE — 84439 ASSAY OF FREE THYROXINE: CPT

## 2023-05-26 PROCEDURE — 36415 COLL VENOUS BLD VENIPUNCTURE: CPT

## 2023-05-30 LAB
IGF-I SERPL-MCNC: 126 NG/ML (ref 28–231)
IGF-I Z-SCORE SERPL: 0.5

## 2023-06-05 DIAGNOSIS — K64.1 GRADE II HEMORRHOIDS: ICD-10-CM

## 2023-06-06 RX ORDER — HYDROCORTISONE ACETATE 25 MG/1
25 SUPPOSITORY RECTAL EVERY 12 HOURS
Qty: 12 SUPPOSITORY | Refills: 0 | Status: SHIPPED | OUTPATIENT
Start: 2023-06-06 | End: 2023-06-16 | Stop reason: SDUPTHER

## 2023-06-09 ENCOUNTER — OFFICE VISIT (OUTPATIENT)
Dept: ENDOCRINOLOGY | Facility: MEDICAL CENTER | Age: 68
End: 2023-06-09
Attending: INTERNAL MEDICINE
Payer: MEDICARE

## 2023-06-09 VITALS
DIASTOLIC BLOOD PRESSURE: 66 MMHG | HEIGHT: 64 IN | WEIGHT: 164.2 LBS | HEART RATE: 88 BPM | OXYGEN SATURATION: 93 % | SYSTOLIC BLOOD PRESSURE: 118 MMHG | BODY MASS INDEX: 28.03 KG/M2

## 2023-06-09 DIAGNOSIS — E22.1 HYPERPROLACTINEMIA (HCC): ICD-10-CM

## 2023-06-09 DIAGNOSIS — R79.89 MACROPROLACTINEMIA: ICD-10-CM

## 2023-06-09 DIAGNOSIS — E03.9 PRIMARY HYPOTHYROIDISM: ICD-10-CM

## 2023-06-09 DIAGNOSIS — E55.9 VITAMIN D DEFICIENCY: ICD-10-CM

## 2023-06-09 PROCEDURE — 99214 OFFICE O/P EST MOD 30 MIN: CPT | Performed by: INTERNAL MEDICINE

## 2023-06-09 PROCEDURE — 3074F SYST BP LT 130 MM HG: CPT | Performed by: INTERNAL MEDICINE

## 2023-06-09 PROCEDURE — 99211 OFF/OP EST MAY X REQ PHY/QHP: CPT | Performed by: INTERNAL MEDICINE

## 2023-06-09 PROCEDURE — 3078F DIAST BP <80 MM HG: CPT | Performed by: INTERNAL MEDICINE

## 2023-06-09 RX ORDER — LEVOTHYROXINE SODIUM 0.05 MG/1
50 TABLET ORAL
Qty: 90 TABLET | Refills: 2 | Status: SHIPPED | OUTPATIENT
Start: 2023-06-09 | End: 2024-01-03 | Stop reason: SDUPTHER

## 2023-06-09 ASSESSMENT — FIBROSIS 4 INDEX: FIB4 SCORE: 1.42

## 2023-06-09 NOTE — PROGRESS NOTES
Chief Complaint: Follow up for Hyperprolactinemia due to a Macroprolactinoma,  resolved.   Hypothyroidism.     HPI:     Gerri Castelan is a 67 y.o. female here for follow up of the above medical issue.  She was initially discovered to have a pituitary macroadenoma producing prolactin after she had some memory problems.  Initial MRI showed a 26 mm macroadenoma and her baseline prolactin was 889 on October 2018.    Interestingly she was started on bromocriptine by another endocrinologist instead of cabergoline but she responded to the medication and has tolerated without side effects.    After I saw her I placed on cabergoline to replace bromocriptine as it is superior and more effective    Her pituitary MRI  on 9/2022 showed that the pituitary tumor that was last seen in  2021 is no longer visible    I then conducted a trial of withdrawal of cabergoline 6 months ago which was successful as her prolactin is still normal    Her last prolactin is 17 on 5/2023          She has primary hypothyroidism that was diagnosed over 10 years ago and this predates the diagnosis of her prior hyperprolactinemia.    She has remained on levothyroxine 75 MCG daily which has been her dose for at least 12 mos  She reports difficulty with sleep and fatigue   She denies palpitations and anxiety  Her TSH was 0.170 on 5/2023   Her TSH was 0.5 on July 2022        Patient's medications, allergies, and social histories were reviewed and updated as appropriate.      ROS:     CONS:     No fever, no chills   EYES:     No diplopia, no blurry vision   CV:           No chest pain, no palpitations   PULM:     No SOB, no cough, no hemoptysis.   GI:            No nausea, no vomiting, no diarrhea, no constipation   ENDO:     No polyuria, no polydipsia, no heat intolerance, no cold intolerance       Past Medical History:  Problem List:  2023-02: History of basal cell carcinoma (BCC) of skin  2023-02: History of pituitary adenoma  2023-02: Vasomotor  symptoms due to menopause  2020-03: History of malignant melanoma  2020-03: History of left breast cancer  2020-03: History of bilateral mastectomy  2020-03: Grade II hemorrhoids  2020-03: Menopausal symptom  2019-05: Acute infective otitis externa, right  2018-10: Incidentally-noted pituitary tumor  2018-09: Pituitary adenoma with extrasellar extension (HCC)  2018-09: Pituitary macroadenoma (HCC)  2018-09: Amnesia  2018-09: Hypothyroidism  2016-12: Elevated fasting glucose  2016-11: Abnormal echocardiogram  2016-10: Family history of early CAD  2016-10: History of TIA (transient ischemic attack)  2016-09: Dyslipidemia  2016-09: Primary hypothyroidism  2016-09: Altered mental status  2016-09: Chronic right shoulder pain  2016-09: Frequent loose stools  2016-03: Macroprolactinemia  2012-12: Enlarged gallbladder  2012-11: Gallbladder polyp  2011-02: Melanoma (HCC)  2010-12: Family history of breast cancer in mother  2009-05: GERD (gastroesophageal reflux disease)  Personal history of breast cancer  Chronic migraine without aura without status migrainosus, not   intractable  Prolactinoma (HCC)  Hyperprolactinemia (HCC)      Past Surgical History:  Past Surgical History:   Procedure Laterality Date    ANAIS BY LAPAROSCOPY  12/6/2012    Performed by Yessica Ocampo M.D. at SURGERY [facility] ORS    NIPPLE RECONSTRUCTION  12/1/2011    Performed by PATEL DALTON at SURGERY [facility] ORS    FLAP GRAFT  12/1/2011    Performed by PATEL DALTON at [facility]    BREAST RECONSTRUCTION  7/15/2011    Performed by PATEL DALTON at [facility] ORS    TISSUE EXPANDER PLACE/REMOVE  7/15/2011    Performed by PATEL DALTON at [facility] ORS    BREAST IMPLANT REVISION  7/15/2011    Performed by PATEL DALTON at [facility] ORS    CAPSULECTOMY  7/15/2011    Performed by PATEL DALTON at [facility] ORS    TISSUE TRANSFER/REARRANGE  7/15/2011    Performed by  "PATEL DALTON at SURGERY [facility] ORS    MASTECTOMY  3/29/2011    Performed by HANNAH LEVIN at SURGERY [facility] ORS    BREAST RECONSTRUCTION  3/29/2011    Performed by PATEL DALTON at SURGERY [facility] ORS    TISSUE EXPANDER PLACE/REMOVE  3/29/2011    Performed by PATEL DALTON at SURGERY [facility] ORS    BREAST BIOPSY  2/24/2011    Performed by HANNAH LEVIN at SURGERY [facility] ORS    LUMPECTOMY  1/19/2011    Performed by HANNAH LEVIN at SURGERY [facility] DAY [facility] ORS    NODE BIOPSY SENTINEL  1/19/2011    Performed by HANNAH LEVIN at SURGERY [facility] DAY [facility] ORS    COLONOSCOPY  2/2005    normal, due 2015    OTHER ORTHOPEDIC SURGERY  1997    knee surgery ACL repair    TUBAL LIGATION  1987    OTHER  1983    malignant melanoma skin cancer upper back        Allergies:  Amoxicillin and Arimidex [anastrozole]     Social History:  Social History     Tobacco Use    Smoking status: Never    Smokeless tobacco: Never   Vaping Use    Vaping Use: Never used   Substance Use Topics    Alcohol use: Not Currently     Comment: occasional - wine or clara    Drug use: No        Family History:   family history includes Cancer (age of onset: 60) in her mother; Dementia in her mother; Diabetes (age of onset: 54) in her brother; Heart Attack in her brother, maternal grandfather, and sister; Heart Disease (age of onset: 52) in her sister; Hyperlipidemia in her mother.      PHYSICAL EXAM:   Vital signs: /66   Pulse 88   Ht 1.626 m (5' 4\")   Wt 74.5 kg (164 lb 3.2 oz)   LMP 10/30/2004   SpO2 93%   BMI 28.18 kg/m²   GENERAL: Well-developed, well-nourished in no apparent distress.   EYE:  No ocular asymmetry, PERRLA  HENT: Pink, moist mucous membranes.    NECK: No thyromegaly.   CARDIOVASCULAR:  No murmurs  LUNGS: Clear breath sounds  ABDOMEN: Soft, nontender   EXTREMITIES: No clubbing, cyanosis, or edema.   NEUROLOGICAL: No gross focal motor abnormalities   LYMPH: No cervical adenopathy " seen  SKIN: No rashes, lesions.       Labs:  Lab Results   Component Value Date/Time    SODIUM 142 2023 08:06 AM    POTASSIUM 4.4 2023 08:06 AM    CHLORIDE 109 2023 08:06 AM    CO2 23 2023 08:06 AM    ANION 10.0 2023 08:06 AM    GLUCOSE 106 (H) 2023 08:06 AM    BUN 23 (H) 2023 08:06 AM    CREATININE 0.76 2023 08:06 AM    CALCIUM 9.5 2023 08:06 AM    ASTSGOT 20 2023 08:06 AM    ALTSGPT 22 2023 08:06 AM    TBILIRUBIN 0.4 2023 08:06 AM    ALBUMIN 4.1 2023 08:06 AM    TOTPROTEIN 6.5 2023 08:06 AM    GLOBULIN 2.4 2023 08:06 AM    AGRATIO 1.7 2023 08:06 AM       Lab Results   Component Value Date/Time    SODIUM 140 2022 0710    POTASSIUM 4.6 2022 0710    CHLORIDE 106 2022 0710    CO2 24 2022 0710    GLUCOSE 105 (H) 2022 0710    BUN 20 2022 0710    CREATININE 0.80 2022 0710    CALCIUM 9.8 2022 0710    ANION 10.0 2022 0710       Lab Results   Component Value Date/Time    CHOLSTRLTOT 174 2021 0714    TRIGLYCERIDE 193 (H) 2021 0714    HDL 55 2021 0714    LDL 80 2021 0714       Lab Results   Component Value Date/Time    TSHULTRASEN 0.500 2022 0710     Lab Results   Component Value Date/Time    FREET4 1.36 2022 0710     Lab Results   Component Value Date/Time    FREET3 2.68 2022 0710     No results found for: THYSTIMIG    Lab Results   Component Value Date/Time    MICROSOMALA 0.5 2019 1433         Imagin/4/2022 4:49 PM     HISTORY/REASON FOR EXAM:  Pituitary adenoma, known or suspected        TECHNIQUE/EXAM DESCRIPTION:  MRI of the brain without and with contrast, pituitary protocol.     T1 3D TFE sagittal, T2 Drive Turbo spin-echo axial, and 3D FLAIR coronal images were obtained of the whole brain. Thin-section images of the sella were obtained including: T2 BARRETT coronal, T1 or T1-weighted 3D BARRETT precontrast coronal, T1 or  T1-weighted 3D   BARRETT postcontrast coronal, T1 or T1-weighted 3DTSE precontrast sagittal and T1 or T1-weighted 3D BARRETT postcontrast sagittal images. Optional T1-weighted dynamic contrast-infused coronal BARRETT images may also be obtained.     The study was performed on a [place] 3.0 [place] MRI scanner.     15 mL ProHance contrast was administered intravenously.     COMPARISON:  5/12/2021     FINDINGS:  The thin-section images of the sella show the pituitary to be of normal size and configuration. There is uniform enhancement. The pituitary stalk is in the midline. There are no suprasellar or parasellar mass lesions. The cavernous sinuses show normal   enhancement and configuration. Vascular flow voids in the juxtasellar carotid arteries are unremarkable.     A few nonspecific T2 hyperintensities in the subcortical and periventricular white matter. Mild cerebral volume loss is seen. There is no intra-axial space-occupying lesion. The ventricles, cortical sulci and the basal cisterns are prominent consistent   with mild cerebral volume loss. The visualized flow voids of the cerebral vasculature are unremarkable.  There is no large lesion identified in the expected course of the intracranial portions of the cranial nerves.     The skull bones are unremarkable.        IMPRESSION:     1.  There is no evidence of pituitary mass. There is been no significant interval change.  2.  Mild cerebral volume loss.  3.  Mild chronic microvascular ischemic disease.        ASSESSMENT/PLAN:     1. Hyperprolactinemia (HCC)  Controlled  She has done well on trial of withdrawal of cabergoline  Her prolactin is stable and still normal  Continue observation for now  We will watch out for symptoms of hyperprolactinemia    2. Macroprolactinemia  Resolved  Macroprolactinoma is gone on last MRI on 9/2022  Please see plan above  Continue observation for now  We will repeat pituitary MRI if medically necessary depending on prolactin levels    3.  Primary hypothyroidism  Uncontrolled  TSH is low   She reports sleep disturbance  Reduce levothyroxine 50  MCG daily  Repeat TSH levels again in 6 to 12 months    4. Vitamin D deficiency  Stable   Vitamin D labs were reviewed with patient  Continue current supplements  Continue monitoring levels       Return in about 6 months (around 12/9/2023).      Thank you kindly for allowing me to participate in the thyroid care plan for this patient.    Jesus Smith MD, FACE, [facility]      CC:   Davidson Murillo M.D.

## 2023-06-16 DIAGNOSIS — K64.1 GRADE II HEMORRHOIDS: ICD-10-CM

## 2023-06-16 RX ORDER — HYDROCORTISONE ACETATE 25 MG/1
25 SUPPOSITORY RECTAL EVERY 12 HOURS
Qty: 12 SUPPOSITORY | Refills: 0 | Status: SHIPPED | OUTPATIENT
Start: 2023-06-16 | End: 2023-06-21 | Stop reason: SDUPTHER

## 2023-06-21 DIAGNOSIS — K64.1 GRADE II HEMORRHOIDS: ICD-10-CM

## 2023-06-21 DIAGNOSIS — K21.9 GASTROESOPHAGEAL REFLUX DISEASE WITHOUT ESOPHAGITIS: ICD-10-CM

## 2023-06-21 DIAGNOSIS — E78.5 DYSLIPIDEMIA: ICD-10-CM

## 2023-06-21 RX ORDER — PANTOPRAZOLE SODIUM 40 MG/1
40 TABLET, DELAYED RELEASE ORAL DAILY
Qty: 90 TABLET | Refills: 3 | Status: SHIPPED | OUTPATIENT
Start: 2023-06-21 | End: 2024-01-03

## 2023-06-21 RX ORDER — HYDROCORTISONE ACETATE 25 MG/1
25 SUPPOSITORY RECTAL EVERY 12 HOURS
Qty: 12 SUPPOSITORY | Refills: 0 | Status: SHIPPED | OUTPATIENT
Start: 2023-06-21

## 2023-06-21 RX ORDER — ATORVASTATIN CALCIUM 20 MG/1
20 TABLET, FILM COATED ORAL
Qty: 90 TABLET | Refills: 3 | Status: SHIPPED | OUTPATIENT
Start: 2023-06-21 | End: 2023-11-06

## 2023-08-08 ENCOUNTER — APPOINTMENT (RX ONLY)
Dept: URBAN - METROPOLITAN AREA CLINIC 22 | Facility: CLINIC | Age: 68
Setting detail: DERMATOLOGY
End: 2023-08-08

## 2023-08-08 DIAGNOSIS — D22 MELANOCYTIC NEVI: ICD-10-CM

## 2023-08-08 DIAGNOSIS — Z85.820 PERSONAL HISTORY OF MALIGNANT MELANOMA OF SKIN: ICD-10-CM

## 2023-08-08 DIAGNOSIS — L82.1 OTHER SEBORRHEIC KERATOSIS: ICD-10-CM

## 2023-08-08 DIAGNOSIS — D18.0 HEMANGIOMA: ICD-10-CM

## 2023-08-08 DIAGNOSIS — Z85.828 PERSONAL HISTORY OF OTHER MALIGNANT NEOPLASM OF SKIN: ICD-10-CM

## 2023-08-08 DIAGNOSIS — L81.4 OTHER MELANIN HYPERPIGMENTATION: ICD-10-CM

## 2023-08-08 PROBLEM — D22.5 MELANOCYTIC NEVI OF TRUNK: Status: ACTIVE | Noted: 2023-08-08

## 2023-08-08 PROBLEM — D18.01 HEMANGIOMA OF SKIN AND SUBCUTANEOUS TISSUE: Status: ACTIVE | Noted: 2023-08-08

## 2023-08-08 PROBLEM — D48.5 NEOPLASM OF UNCERTAIN BEHAVIOR OF SKIN: Status: ACTIVE | Noted: 2023-08-08

## 2023-08-08 PROCEDURE — ? SUNSCREEN TREATMENT REGIMEN

## 2023-08-08 PROCEDURE — 99213 OFFICE O/P EST LOW 20 MIN: CPT

## 2023-08-08 PROCEDURE — ? COUNSELING

## 2023-08-08 PROCEDURE — ? DEFER

## 2023-08-08 ASSESSMENT — LOCATION SIMPLE DESCRIPTION DERM
LOCATION SIMPLE: LEFT NOSE
LOCATION SIMPLE: RIGHT CHEEK
LOCATION SIMPLE: RIGHT FOREARM
LOCATION SIMPLE: LEFT FOREARM
LOCATION SIMPLE: INFERIOR FOREHEAD
LOCATION SIMPLE: RIGHT UPPER ARM
LOCATION SIMPLE: UPPER BACK
LOCATION SIMPLE: RIGHT UPPER BACK
LOCATION SIMPLE: LEFT UPPER BACK
LOCATION SIMPLE: ABDOMEN

## 2023-08-08 ASSESSMENT — LOCATION DETAILED DESCRIPTION DERM
LOCATION DETAILED: LEFT SUPERIOR FLANK
LOCATION DETAILED: RIGHT SUPERIOR MEDIAL UPPER BACK
LOCATION DETAILED: INFERIOR MID FOREHEAD
LOCATION DETAILED: RIGHT VENTRAL PROXIMAL FOREARM
LOCATION DETAILED: LEFT MID-UPPER BACK
LOCATION DETAILED: EPIGASTRIC SKIN
LOCATION DETAILED: INFERIOR THORACIC SPINE
LOCATION DETAILED: RIGHT LATERAL PROXIMAL UPPER ARM
LOCATION DETAILED: RIGHT SUPERIOR MEDIAL MALAR CHEEK
LOCATION DETAILED: LEFT SOFT TRIANGLE OF THE NOSE
LOCATION DETAILED: LEFT VENTRAL PROXIMAL FOREARM

## 2023-08-08 ASSESSMENT — LOCATION ZONE DERM
LOCATION ZONE: FACE
LOCATION ZONE: NOSE
LOCATION ZONE: ARM
LOCATION ZONE: TRUNK

## 2023-08-08 NOTE — PROCEDURE: DEFER
Introduction Text (Please End With A Colon): Excision:
X Size Of Lesion In Cm (Optional): 0
Detail Level: Detailed

## 2023-08-18 ENCOUNTER — TELEMEDICINE (OUTPATIENT)
Dept: MEDICAL GROUP | Facility: MEDICAL CENTER | Age: 68
End: 2023-08-18
Payer: MEDICARE

## 2023-08-18 VITALS — BODY MASS INDEX: 27.31 KG/M2 | WEIGHT: 160 LBS | HEIGHT: 64 IN

## 2023-08-18 DIAGNOSIS — G31.9 CEREBRAL ATROPHY, MILD (HCC): ICD-10-CM

## 2023-08-18 DIAGNOSIS — Z20.822 EXPOSURE TO COVID-19 VIRUS: ICD-10-CM

## 2023-08-18 PROCEDURE — 99213 OFFICE O/P EST LOW 20 MIN: CPT | Mod: 95 | Performed by: STUDENT IN AN ORGANIZED HEALTH CARE EDUCATION/TRAINING PROGRAM

## 2023-08-18 RX ORDER — RIBOFLAVIN (VITAMIN B2) 100 MG
TABLET ORAL
COMMUNITY
Start: 2023-08-18

## 2023-08-18 ASSESSMENT — ENCOUNTER SYMPTOMS
HEADACHES: 0
WHEEZING: 0
NAUSEA: 0
WEIGHT LOSS: 0
VOMITING: 0
PALPITATIONS: 0
SHORTNESS OF BREATH: 0
FEVER: 0
CHILLS: 0
DIZZINESS: 0

## 2023-08-18 ASSESSMENT — FIBROSIS 4 INDEX: FIB4 SCORE: 1.42

## 2023-08-18 NOTE — PROGRESS NOTES
Virtual Visit: Established Patient   This visit was conducted via Zoom using secure and encrypted videoconferencing technology.   The patient was in their home in the state of [place].    The patient's identity was confirmed and verbal consent was obtained for this virtual visit.    Subjective:   CC:   Chief Complaint   Patient presents with    Follow-Up     She just mainly wants to talk about the Covid medication and see if she can get some. She tested negative and has no symptoms but her  is positive.      Gerri Castelan is a 67 y.o. female presenting for evaluation and management of:     tested positive with COVID 19 yesterday  -  had body ache, rigor, coughing, sneezing, weakness  - patient does not have any symptoms. Sleeping in separate restroom.   - covid vacinnated x 3  - recent       ROS   Review of Systems   Constitutional:  Negative for chills, fever and weight loss.   HENT:  Negative for hearing loss.    Respiratory:  Negative for shortness of breath and wheezing.    Cardiovascular:  Negative for chest pain and palpitations.   Gastrointestinal:  Negative for nausea and vomiting.   Genitourinary:  Negative for frequency and urgency.   Skin:  Negative for rash.   Neurological:  Negative for dizziness and headaches.         Current medicines (including changes today)  Current Outpatient Medications   Medication Sig Dispense Refill    Ascorbic Acid (VITAMIN C) 100 MG Tab       NON SPECIFIED 50 mg. Zinc Gluconate 50 MG Oral Tablet      Nirmatrelvir&Ritonavir 300/100 20 x 150 MG & 10 x 100MG Tablet Therapy Pack Take 300 mg nirmatrelvir (two 150 mg tablets) with 100 mg ritonavir (one 100 mg tablet) by mouth, with all three tablets taken together twice daily for 5 days. 30 Each 0    hydrocortisone (ANUSOL-HC) 25 MG Suppos Insert 1 Suppository into the rectum every 12 hours. 12 Suppository 0    pantoprazole (PROTONIX) 40 MG Tablet Delayed Response Take 1 Tablet by mouth every day. 90  "Tablet 3    atorvastatin (LIPITOR) 20 MG Tab Take 1 Tablet by mouth at bedtime. 90 Tablet 3    levothyroxine (SYNTHROID) 50 MCG Tab Take 1 Tablet by mouth every morning on an empty stomach. 90 Tablet 2    meclizine (ANTIVERT) 25 MG Tab Take 1 Tablet by mouth 3 times a day as needed for Vertigo. 30 Tablet 0    sucralfate (CARAFATE) 1 GM Tab DISSOLVE 1 TABLET IN 10-15 ML OF WATER AND TAKE FOUR TIMES DAILY IMMEDIATELY AFTER MEALS AND AT BEDTIME      Cyanocobalamin (VITAMIN B-12) 5000 MCG SL Tab Place  under tongue.      VITAMIN D PO Take 5,000 Units by mouth every day.       No current facility-administered medications for this visit.       Patient Active Problem List    Diagnosis Date Noted    History of basal cell carcinoma (BCC) of skin 02/10/2023    History of pituitary adenoma 02/10/2023    Vasomotor symptoms due to menopause 02/10/2023    History of malignant melanoma 03/09/2020    History of left breast cancer 03/09/2020    History of bilateral mastectomy 03/09/2020    Grade II hemorrhoids 03/09/2020    Menopausal symptom 03/09/2020    Elevated fasting glucose 12/30/2016    Family history of early CAD 10/17/2016    Dyslipidemia 09/26/2016    Primary hypothyroidism 09/26/2016    Chronic right shoulder pain 09/22/2016    Macroprolactinemia 03/22/2016    Personal history of breast cancer     Family history of breast cancer in mother 12/01/2010    GERD (gastroesophageal reflux disease) 05/19/2009        Objective:   Ht 1.626 m (5' 4\")   Wt 72.6 kg (160 lb)   LMP 10/30/2004   BMI 27.46 kg/m²     Physical Exam:  Constitutional: Alert, no distress, well-groomed.  Skin: No rashes in visible areas.  Eye: Round. Conjunctiva clear, lids normal. No icterus.   Neck: No masses, no thyromegaly. Moves freely without pain.  Respiratory: Unlabored respiratory effort, no cough or audible wheeze speech fluent, face symmetric, speaking inc omplete sentence without evidence of SOB  Psych: Alert and oriented x3, normal affect and " mood.     Assessment and Plan:   The following treatment plan was discussed:     1. Exposure to COVID-19 virus  Acute, stable  Exposure, to COVID -19 from recent travel and from , currently asymptomatic.   Discussed a/e and indication paxlovid, patient would like to proveed with therapy as she is at higher risk due to age.   GFT> 60.   Plan  - er/uc indication discussed  - monitor symptoms and vitals  - conservative management discussed.   - Nirmatrelvir&Ritonavir 300/100 20 x 150 MG & 10 x 100MG Tablet Therapy Pack; Take 300 mg nirmatrelvir (two 150 mg tablets) with 100 mg ritonavir (one 100 mg tablet) by mouth, with all three tablets taken together twice daily for 5 days.  Dispense: 30 Each; Refill: 0    2. Cerebral atrophy, mild (HCC)    HCC Gap Form    Diagnosis to address: G31.9 - Cerebral atrophy, mild (HCC)  Assessment and plan: Chronic, stable. Noted on MRI pituitary scan. Continue with lifestyle management, continue statin therapy, continue daily diet and exercise. BP has been good in prior visit. Continue current defined treatment plan. Follow-up at least annually.  Last edited 08/18/23 14:37 PDT by Yony Fajardo M.D.           Follow-up: Return if symptoms worsen or fail to improve.

## 2023-08-24 ENCOUNTER — APPOINTMENT (RX ONLY)
Dept: URBAN - METROPOLITAN AREA CLINIC 15 | Facility: CLINIC | Age: 68
Setting detail: DERMATOLOGY
End: 2023-08-24

## 2023-08-24 DIAGNOSIS — L81.4 OTHER MELANIN HYPERPIGMENTATION: ICD-10-CM

## 2023-08-24 PROBLEM — D48.5 NEOPLASM OF UNCERTAIN BEHAVIOR OF SKIN: Status: ACTIVE | Noted: 2023-08-24

## 2023-08-24 PROCEDURE — 11102 TANGNTL BX SKIN SINGLE LES: CPT

## 2023-08-24 PROCEDURE — ? BIOPSY BY SHAVE METHOD

## 2023-08-24 ASSESSMENT — LOCATION ZONE DERM: LOCATION ZONE: FACE

## 2023-08-24 ASSESSMENT — LOCATION SIMPLE DESCRIPTION DERM: LOCATION SIMPLE: RIGHT CHEEK

## 2023-08-24 ASSESSMENT — LOCATION DETAILED DESCRIPTION DERM: LOCATION DETAILED: RIGHT SUPERIOR MEDIAL MALAR CHEEK

## 2023-09-05 NOTE — PROGRESS NOTES
"Subjective:      Patient ID: Sathish Cooney Jr. is a 53 y.o. male.    Vitals:  height is 5' 9" (1.753 m) and weight is 90.7 kg (200 lb). His tympanic temperature is 98.3 °F (36.8 °C). His blood pressure is 124/89 and his pulse is 85. His respiration is 18 and oxygen saturation is 97%.     Chief Complaint: Sinus Problem    Sinus Problem  This is a new problem. The current episode started yesterday. The problem has been gradually worsening since onset. There has been no fever. His pain is at a severity of 2/10. The pain is mild. Associated symptoms include congestion, coughing, headaches, sinus pressure and a sore throat. Pertinent negatives include no ear pain. (Post nasal drip, ) Treatments tried: zyrtec, benadryl, cough drops. The treatment provided no relief.       HENT:  Positive for congestion, sinus pressure and sore throat. Negative for ear pain.    Respiratory:  Positive for cough.    Neurological:  Positive for headaches.      Objective:     Physical Exam   Constitutional: He is oriented to person, place, and time. He appears well-developed. He is cooperative.  Non-toxic appearance. He appears ill. No distress.   HENT:   Head: Normocephalic and atraumatic.   Ears:   Right Ear: Tympanic membrane, external ear and ear canal normal.   Left Ear: Tympanic membrane, external ear and ear canal normal.   Nose: Mucosal edema, rhinorrhea and congestion present. No nasal deformity. No epistaxis. Right sinus exhibits no maxillary sinus tenderness and no frontal sinus tenderness. Left sinus exhibits no maxillary sinus tenderness and no frontal sinus tenderness.   Mouth/Throat: Uvula is midline, oropharynx is clear and moist and mucous membranes are normal. No trismus in the jaw. Normal dentition. No uvula swelling. No oropharyngeal exudate, posterior oropharyngeal edema or posterior oropharyngeal erythema.   Eyes: Conjunctivae and lids are normal. No scleral icterus.   Neck: Trachea normal and phonation normal. " Chief Complaint   Patient presents with   • Immunizations   • Orders Needed     bone density       Subjective:     HPI:   Gerri Castelan presents today with the followin. Need for pneumococcal vaccination  Prevnar 13 today.    2. Postmenopausal status  Needs DEXA bone density scan.  Order discussed and placed.  No history of pathologic fracture.    Recent skin lesion removed, thought to be squamous cell.      Patient Active Problem List    Diagnosis Date Noted   • Acute infective otitis externa, right 2019   • Prolactinoma (HCC)    • Pituitary adenoma with extrasellar extension ([facility]) 2018   • Pituitary macroadenoma (HCC) 2018   • Elevated fasting glucose 2016   • Abnormal echocardiogram 2016   • Family history of early CAD 10/17/2016   • Dyslipidemia 2016   • Idiopathic hypothyroidism 2016   • Chronic right shoulder pain 2016   • Frequent loose stools 2016   • Chronic migraine without aura without status migrainosus, not intractable    • Melanoma ([facility]) 2011   • Personal history of breast cancer    • Family history of breast cancer in mother 2010   • GERD (gastroesophageal reflux disease) 2009       Current medicines (including changes today)  Current Outpatient Medications   Medication Sig Dispense Refill   • CHOLESTYRAMINE PO Take  by mouth.     • atorvastatin (LIPITOR) 20 MG Tab Take 1 Tab by mouth every bedtime. 90 Tab 3   • omeprazole (PRILOSEC) 40 MG delayed-release capsule Take 1 Cap by mouth every day. 90 Cap 3   • levothyroxine (SYNTHROID) 50 MCG Tab Take 1 Tab by mouth every morning before breakfast. 90 Tab 3   • Wheat Dextrin (BENEFIBER PO) Take  by mouth.     • bromocriptine (PARLODEL) 2.5 MG Tab TK 1 T PO BID  0   • Probiotic Product (PROBIOTIC DAILY PO) Take 1 Cap by mouth every day.       No current facility-administered medications for this visit.        Allergies   Allergen Reactions   • Amoxicillin      Headache    "  • Arimidex [Anastrozole] Swelling       ROS: As per HPI       Objective:     /64 (BP Location: Right arm, Patient Position: Sitting)   Pulse 82   Temp 36.8 °C (98.2 °F) (Temporal)   Resp 12   Ht 1.651 m (5' 5\")   Wt 75.8 kg (167 lb)   SpO2 95%  Body mass index is 27.79 kg/m².    Physical Exam:  Constitutional: Well-developed and well-nourished. Not diaphoretic. No distress. Lucid and fluent.  Skin: Skin is warm and dry. No rash noted.  Head: Atraumatic without lesions.  Eyes: Conjunctivae and extraocular motions are normal. Pupils are equal, round, and reactive to light. No scleral icterus.   Mouth/Throat: Tongue normal. Oropharynx is clear and moist. Posterior pharynx without erythema or exudates.  Neck: Supple, trachea midline. No thyromegaly present. No cervical or supraclavicular lymphadenopathy. No JVD or carotid bruits appreciated  Cardiovascular: Regular rate and rhythm.  Normal S1, S2 without murmur appreciated.  Chest: Effort normal. Clear to auscultation throughout. No adventitious sounds.   Extremities: No cyanosis, clubbing, erythema, nor edema.   Neurological: Alert and oriented x 3. No tremor appreciated.  Psychiatric:  Behavior, mood, and affect are appropriate.       Assessment and Plan:     64 y.o. female with the following issues:    1. Need for pneumococcal vaccination  Pneumococcal Conjugate Vaccine 13-Valent   2. Postmenopausal status  DS-BONE DENSITY STUDY (DEXA)         Followup: Return in about 6 months (around 7/17/2020), or if symptoms worsen or fail to improve.  " Neck supple. No edema present. No erythema present. No neck rigidity present.   Cardiovascular: Normal rate, regular rhythm, normal heart sounds and normal pulses.   Pulmonary/Chest: Effort normal and breath sounds normal. No respiratory distress. He has no decreased breath sounds. He has no rhonchi.   Abdominal: Normal appearance.   Musculoskeletal: Normal range of motion.         General: No deformity. Normal range of motion.   Neurological: He is alert and oriented to person, place, and time. He exhibits normal muscle tone. Coordination normal.   Skin: Skin is warm, dry, intact, not diaphoretic and not pale.   Psychiatric: His speech is normal and behavior is normal. Judgment and thought content normal.   Nursing note and vitals reviewed.      Assessment:     1. Symptoms of upper respiratory infection (URI)      Results for orders placed or performed in visit on 09/05/23   SARS Coronavirus 2 Antigen, POCT Manual Read   Result Value Ref Range    SARS Coronavirus 2 Antigen Negative Negative     Acceptable Yes       Plan:       Symptoms of upper respiratory infection (URI)  -     SARS Coronavirus 2 Antigen, POCT Manual Read  -     ipratropium (ATROVENT) 42 mcg (0.06 %) nasal spray; 2 sprays by Each Nostril route 3 (three) times daily. for 5 days  Dispense: 15 mL; Refill: 0  -     dexchlorpheniramin-pseudoephed (RESCON) 2-60 mg Tab; Take 1 tablet by mouth 2 (two) times daily as needed (congestion).  Dispense: 20 tablet; Refill: 0  -     predniSONE (DELTASONE) 20 MG tablet; Take 2 tablets by mouth x1 day, then 1 tablet by mouth x2 days  Dispense: 4 tablet; Refill: 0

## 2023-10-19 ENCOUNTER — TELEPHONE (OUTPATIENT)
Dept: HEALTH INFORMATION MANAGEMENT | Facility: OTHER | Age: 68
End: 2023-10-19

## 2023-11-06 DIAGNOSIS — E78.5 DYSLIPIDEMIA: ICD-10-CM

## 2023-11-06 RX ORDER — ATORVASTATIN CALCIUM 20 MG/1
20 TABLET, FILM COATED ORAL
Qty: 90 TABLET | Refills: 3 | Status: SHIPPED | OUTPATIENT
Start: 2023-11-06

## 2023-11-27 ENCOUNTER — OFFICE VISIT (OUTPATIENT)
Dept: MEDICAL GROUP | Facility: MEDICAL CENTER | Age: 68
End: 2023-11-27
Payer: MEDICARE

## 2023-11-27 VITALS
SYSTOLIC BLOOD PRESSURE: 102 MMHG | OXYGEN SATURATION: 97 % | DIASTOLIC BLOOD PRESSURE: 60 MMHG | RESPIRATION RATE: 18 BRPM | WEIGHT: 165.6 LBS | HEIGHT: 64 IN | BODY MASS INDEX: 28.27 KG/M2 | TEMPERATURE: 98.9 F | HEART RATE: 70 BPM

## 2023-11-27 DIAGNOSIS — R05.3 COUGH, PERSISTENT: ICD-10-CM

## 2023-11-27 DIAGNOSIS — R09.81 NASAL CONGESTION: ICD-10-CM

## 2023-11-27 LAB
FLUAV RNA SPEC QL NAA+PROBE: NEGATIVE
FLUBV RNA SPEC QL NAA+PROBE: NEGATIVE
RSV RNA SPEC QL NAA+PROBE: NEGATIVE
S PYO DNA SPEC NAA+PROBE: NOT DETECTED
SARS-COV-2 RNA RESP QL NAA+PROBE: NEGATIVE

## 2023-11-27 PROCEDURE — 3074F SYST BP LT 130 MM HG: CPT | Performed by: FAMILY MEDICINE

## 2023-11-27 PROCEDURE — 87651 STREP A DNA AMP PROBE: CPT | Performed by: FAMILY MEDICINE

## 2023-11-27 PROCEDURE — 99213 OFFICE O/P EST LOW 20 MIN: CPT | Performed by: FAMILY MEDICINE

## 2023-11-27 PROCEDURE — 3078F DIAST BP <80 MM HG: CPT | Performed by: FAMILY MEDICINE

## 2023-11-27 PROCEDURE — 0241U POCT CEPHEID COV-2, FLU A/B, RSV - PCR: CPT | Performed by: FAMILY MEDICINE

## 2023-11-27 ASSESSMENT — FIBROSIS 4 INDEX: FIB4 SCORE: 1.44

## 2023-11-28 NOTE — PROGRESS NOTES
Chief Complaint   Patient presents with    Cough    Otalgia    Fatigue    Headache       Subjective:     HPI:   Gerri Castelan presents today with the followin. Cough, persistent/Nasal congestion  Patient has had URI symptoms no for just about a week.  She has cough, nasal congestion, headache and she had at the start muscle and bone aches and malaise.  She feels she is getting somewhat better.  On examination the pharynx is relatively normal.  Lungs are clear to auscultation.  Testing here for COVID, flu a and B, RSV and strep were all negative.  Patient does appear to be improving.  OTC treatments discussed.        Patient Active Problem List    Diagnosis Date Noted    Cerebral atrophy, mild (HCC) 2023    History of basal cell carcinoma (BCC) of skin 02/10/2023    History of pituitary adenoma 02/10/2023    Vasomotor symptoms due to menopause 02/10/2023    History of malignant melanoma 2020    History of left breast cancer 2020    History of bilateral mastectomy 2020    Grade II hemorrhoids 2020    Menopausal symptom 2020    Elevated fasting glucose 2016    Family history of early CAD 10/17/2016    Dyslipidemia 2016    Primary hypothyroidism 2016    Chronic right shoulder pain 2016    Macroprolactinemia 2016    Personal history of breast cancer     Family history of breast cancer in mother 2010    GERD (gastroesophageal reflux disease) 2009       Current medicines (including changes today)  Current Outpatient Medications   Medication Sig Dispense Refill    atorvastatin (LIPITOR) 20 MG Tab TAKE 1 TABLET DAILY AT BEDTIME 90 Tablet 3    Ascorbic Acid (VITAMIN C) 100 MG Tab       NON SPECIFIED 50 mg. Zinc Gluconate 50 MG Oral Tablet      Nirmatrelvir&Ritonavir 300/100 20 x 150 MG & 10 x 100MG Tablet Therapy Pack Take 300 mg nirmatrelvir (two 150 mg tablets) with 100 mg ritonavir (one 100 mg tablet) by mouth, with all three  "tablets taken together twice daily for 5 days. 30 Each 0    hydrocortisone (ANUSOL-HC) 25 MG Suppos Insert 1 Suppository into the rectum every 12 hours. 12 Suppository 0    pantoprazole (PROTONIX) 40 MG Tablet Delayed Response Take 1 Tablet by mouth every day. 90 Tablet 3    levothyroxine (SYNTHROID) 50 MCG Tab Take 1 Tablet by mouth every morning on an empty stomach. 90 Tablet 2    meclizine (ANTIVERT) 25 MG Tab Take 1 Tablet by mouth 3 times a day as needed for Vertigo. 30 Tablet 0    sucralfate (CARAFATE) 1 GM Tab DISSOLVE 1 TABLET IN 10-15 ML OF WATER AND TAKE FOUR TIMES DAILY IMMEDIATELY AFTER MEALS AND AT BEDTIME      Cyanocobalamin (VITAMIN B-12) 5000 MCG SL Tab Place  under tongue.      VITAMIN D PO Take 5,000 Units by mouth every day.       No current facility-administered medications for this visit.       Allergies   Allergen Reactions    Amoxicillin      Headache      Arimidex [Anastrozole] Swelling       ROS: As per HPI       Objective:     /60   Pulse 70   Temp 37.2 °C (98.9 °F)   Resp 18   Ht 1.626 m (5' 4\")   Wt 75.1 kg (165 lb 9.6 oz)   SpO2 97%  Body mass index is 28.43 kg/m².    Physical Exam:  Constitutional: Well-developed and well-nourished. Not diaphoretic. No distress. Lucid and fluent.  Sounds somewhat congested.  Skin: Skin is warm and dry. No rash noted.   Head: Atraumatic without lesions.  Eyes: Conjunctivae and extraocular motions are normal. Pupils are equal, round, and reactive to light. No scleral icterus.   Ears:  External ears unremarkable.   Mouth/Throat: Tongue normal. Oropharynx is clear and moist. Posterior pharynx without erythema or exudates.  Neck: Supple, trachea midline. No thyromegaly present. No cervical or supraclavicular lymphadenopathy. No JVD or carotid bruits appreciated  Cardiovascular: Regular rate and rhythm.  Normal S1, S2 without murmur appreciated.  Chest: Effort normal. Clear to auscultation throughout. No adventitious sounds.   Extremities: No " cyanosis, clubbing, erythema, nor edema.   Neurological: Alert and oriented x 3.    Psychiatric:  Behavior, mood, and affect are appropriate.    Test for strep negative.  Test for RSV, COVID and flu a and b negative.     Assessment and Plan:     68 y.o. female with the following issues:    1. Cough, persistent  POCT CoV-2, Flu A/B, RSV by PCR    POCT CEPHEID GROUP A STREP - PCR      2. Nasal congestion  POCT CoV-2, Flu A/B, RSV by PCR    POCT CEPHEID GROUP A STREP - PCR            Followup: Return if symptoms worsen or fail to improve.   Intact

## 2023-12-20 ENCOUNTER — HOSPITAL ENCOUNTER (OUTPATIENT)
Dept: LAB | Facility: MEDICAL CENTER | Age: 68
End: 2023-12-20
Attending: INTERNAL MEDICINE
Payer: MEDICARE

## 2023-12-20 DIAGNOSIS — E22.1 HYPERPROLACTINEMIA (HCC): ICD-10-CM

## 2023-12-20 DIAGNOSIS — E55.9 VITAMIN D DEFICIENCY: ICD-10-CM

## 2023-12-20 DIAGNOSIS — R79.89 MACROPROLACTINEMIA: ICD-10-CM

## 2023-12-20 DIAGNOSIS — E03.9 PRIMARY HYPOTHYROIDISM: ICD-10-CM

## 2023-12-20 LAB
25(OH)D3 SERPL-MCNC: 41 NG/ML (ref 30–100)
ALBUMIN SERPL BCP-MCNC: 4.5 G/DL (ref 3.2–4.9)
ALBUMIN/GLOB SERPL: 1.9 G/DL
ALP SERPL-CCNC: 70 U/L (ref 30–99)
ALT SERPL-CCNC: 15 U/L (ref 2–50)
ANION GAP SERPL CALC-SCNC: 11 MMOL/L (ref 7–16)
AST SERPL-CCNC: 15 U/L (ref 12–45)
BILIRUB SERPL-MCNC: 0.6 MG/DL (ref 0.1–1.5)
BUN SERPL-MCNC: 22 MG/DL (ref 8–22)
CALCIUM ALBUM COR SERPL-MCNC: 9.6 MG/DL (ref 8.5–10.5)
CALCIUM SERPL-MCNC: 10 MG/DL (ref 8.5–10.5)
CHLORIDE SERPL-SCNC: 106 MMOL/L (ref 96–112)
CO2 SERPL-SCNC: 25 MMOL/L (ref 20–33)
CREAT SERPL-MCNC: 0.77 MG/DL (ref 0.5–1.4)
GFR SERPLBLD CREATININE-BSD FMLA CKD-EPI: 83 ML/MIN/1.73 M 2
GLOBULIN SER CALC-MCNC: 2.4 G/DL (ref 1.9–3.5)
GLUCOSE SERPL-MCNC: 89 MG/DL (ref 65–99)
POTASSIUM SERPL-SCNC: 4.6 MMOL/L (ref 3.6–5.5)
PROLACTIN SERPL-MCNC: 20.3 NG/ML (ref 2.8–26)
PROT SERPL-MCNC: 6.9 G/DL (ref 6–8.2)
SODIUM SERPL-SCNC: 142 MMOL/L (ref 135–145)
T4 FREE SERPL-MCNC: 1.13 NG/DL (ref 0.93–1.7)
TSH SERPL DL<=0.005 MIU/L-ACNC: 3.19 UIU/ML (ref 0.38–5.33)

## 2023-12-20 PROCEDURE — 84439 ASSAY OF FREE THYROXINE: CPT

## 2023-12-20 PROCEDURE — 84146 ASSAY OF PROLACTIN: CPT

## 2023-12-20 PROCEDURE — 80053 COMPREHEN METABOLIC PANEL: CPT

## 2023-12-20 PROCEDURE — 36415 COLL VENOUS BLD VENIPUNCTURE: CPT

## 2023-12-20 PROCEDURE — 82306 VITAMIN D 25 HYDROXY: CPT

## 2023-12-20 PROCEDURE — 84443 ASSAY THYROID STIM HORMONE: CPT

## 2024-01-03 ENCOUNTER — OFFICE VISIT (OUTPATIENT)
Dept: ENDOCRINOLOGY | Facility: MEDICAL CENTER | Age: 69
End: 2024-01-03
Attending: INTERNAL MEDICINE
Payer: MEDICARE

## 2024-01-03 VITALS
SYSTOLIC BLOOD PRESSURE: 94 MMHG | HEIGHT: 64 IN | OXYGEN SATURATION: 94 % | HEART RATE: 74 BPM | WEIGHT: 167.1 LBS | BODY MASS INDEX: 28.53 KG/M2 | DIASTOLIC BLOOD PRESSURE: 58 MMHG

## 2024-01-03 DIAGNOSIS — E03.9 PRIMARY HYPOTHYROIDISM: ICD-10-CM

## 2024-01-03 DIAGNOSIS — E55.9 VITAMIN D DEFICIENCY: ICD-10-CM

## 2024-01-03 DIAGNOSIS — R79.89 MACROPROLACTINEMIA: ICD-10-CM

## 2024-01-03 DIAGNOSIS — E22.1 HYPERPROLACTINEMIA (HCC): ICD-10-CM

## 2024-01-03 DIAGNOSIS — G47.09 OTHER INSOMNIA: ICD-10-CM

## 2024-01-03 PROCEDURE — 99214 OFFICE O/P EST MOD 30 MIN: CPT | Performed by: INTERNAL MEDICINE

## 2024-01-03 PROCEDURE — 3078F DIAST BP <80 MM HG: CPT | Performed by: INTERNAL MEDICINE

## 2024-01-03 PROCEDURE — 99212 OFFICE O/P EST SF 10 MIN: CPT | Performed by: INTERNAL MEDICINE

## 2024-01-03 PROCEDURE — 3074F SYST BP LT 130 MM HG: CPT | Performed by: INTERNAL MEDICINE

## 2024-01-03 RX ORDER — LEVOTHYROXINE SODIUM 0.05 MG/1
50 TABLET ORAL
Qty: 90 TABLET | Refills: 3 | Status: SHIPPED | OUTPATIENT
Start: 2024-01-03

## 2024-01-03 RX ORDER — FAMOTIDINE 40 MG/1
TABLET, FILM COATED ORAL
COMMUNITY
Start: 2023-08-28

## 2024-01-03 RX ORDER — TRAZODONE HYDROCHLORIDE 50 MG/1
50 TABLET ORAL NIGHTLY
Qty: 30 TABLET | Refills: 3 | Status: SHIPPED | OUTPATIENT
Start: 2024-01-03

## 2024-01-03 ASSESSMENT — FIBROSIS 4 INDEX: FIB4 SCORE: 1.31

## 2024-01-03 NOTE — PROGRESS NOTES
Chief Complaint: Follow up for Hyperprolactinemia due to a Macroprolactinoma,  resolved.   Hypothyroidism.     HPI:     Gerri Castelan is a 68 y.o. female here for follow up of the above medical issue.  She was initially discovered to have a pituitary macroadenoma producing prolactin after she had some memory problems.  Initial MRI showed a 26 mm macroadenoma and her baseline prolactin was 889 on October 2018.    Interestingly she was started on bromocriptine by another endocrinologist instead of cabergoline but she responded to the medication and has tolerated without side effects.    After I saw her I placed on cabergoline to replace bromocriptine as it is superior and more effective/  Her pituitary MRI  on 9/2022 showed resolution of the pituitary tumor.  A trial of withdrawal of cabergoline in Nov 2022 was successful as her prolactin stayed normal.     Her last prolactin is 20 on 12/2023      She has primary hypothyroidism that was diagnosed over 10 years ago and this predates the diagnosis of her prior hyperprolactinemia.    She has remained on levothyroxine 50 MCG daily which has been her dose for at least 6 mos.  Previously TSH was 0.170 on 5/2023 while on Levothyroxine 75 mcg  She denies palpitations and anxiety  She still reports problems falling and staying asleep  Her TSH is 3.1 on 12/2023 (50 mcg)  Her TSH was 0.170 on 5/2023  (75 mcg)  Her TSH was 0.5 on July 2022        Patient's medications, allergies, and social histories were reviewed and updated as appropriate.      ROS:     CONS:     No fever, no chills   EYES:     No diplopia, no blurry vision   CV:           No chest pain, no palpitations   PULM:     No SOB, no cough, no hemoptysis.   GI:            No nausea, no vomiting, no diarrhea, no constipation   ENDO:     No polyuria, no polydipsia, no heat intolerance, no cold intolerance       Past Medical History:  Problem List:  2023-08: Cerebral atrophy, mild (HCC)  2023-02: History of basal  cell carcinoma (BCC) of skin  2023-02: History of pituitary adenoma  2023-02: Vasomotor symptoms due to menopause  2020-03: History of malignant melanoma  2020-03: History of left breast cancer  2020-03: History of bilateral mastectomy  2020-03: Grade II hemorrhoids  2020-03: Menopausal symptom  2019-05: Acute infective otitis externa, right  2018-10: Incidentally-noted pituitary tumor  2018-09: Pituitary adenoma with extrasellar extension (HCC)  2018-09: Pituitary macroadenoma (HCC)  2018-09: Amnesia  2018-09: Hypothyroidism  2016-12: Elevated fasting glucose  2016-11: Abnormal echocardiogram  2016-10: Family history of early CAD  2016-10: History of TIA (transient ischemic attack)  2016-09: Dyslipidemia  2016-09: Primary hypothyroidism  2016-09: Altered mental status  2016-09: Chronic right shoulder pain  2016-09: Frequent loose stools  2016-03: Macroprolactinemia  2012-12: Enlarged gallbladder  2012-11: Gallbladder polyp  2011-02: Melanoma (HCC)  2010-12: Family history of breast cancer in mother  2009-05: GERD (gastroesophageal reflux disease)  Personal history of breast cancer  Chronic migraine without aura without status migrainosus, not   intractable  Prolactinoma (HCC)  Hyperprolactinemia (HCC)      Past Surgical History:  Past Surgical History:   Procedure Laterality Date    ANAIS BY LAPAROSCOPY  12/6/2012    Performed by Yessica Ocampo M.D. at SURGERY [facility] ORS    NIPPLE RECONSTRUCTION  12/1/2011    Performed by PATEL DALTON at SURGERY [facility]    FLAP GRAFT  12/1/2011    Performed by PATEL DALTON at [facility]    BREAST RECONSTRUCTION  7/15/2011    Performed by PATEL DALTON at [facility] ORS    TISSUE EXPANDER PLACE/REMOVE  7/15/2011    Performed by PATEL DALTON at [facility]    BREAST IMPLANT REVISION  7/15/2011    Performed by PATEL DALTON at [facility]    CAPSULECTOMY  7/15/2011    Performed by PATEL DALTON  "at SURGERY [facility]    TISSUE TRANSFER/REARRANGE  7/15/2011    Performed by PATEL DALTON at SURGERY [facility] ORS    MASTECTOMY  3/29/2011    Performed by HANNAH LEVIN at SURGERY [facility] ORS    BREAST RECONSTRUCTION  3/29/2011    Performed by PATEL DALTON at SURGERY [facility] ORS    TISSUE EXPANDER PLACE/REMOVE  3/29/2011    Performed by PATEL DALTON at SURGERY [facility] ORS    BREAST BIOPSY  2/24/2011    Performed by HANNAH LEVIN at [facility]ERY [facility]    LUMPECTOMY  1/19/2011    Performed by HANNAH LEVIN at [facility]ERY [facility] ORS    NODE BIOPSY SENTINEL  1/19/2011    Performed by HANNAH LEVIN at [facility]ERY [facility]S    COLONOSCOPY  2/2005    normal, due 2015    OTHER ORTHOPEDIC SURGERY  1997    knee surgery ACL repair    TUBAL LIGATION  1987    OTHER  1983    malignant melanoma skin cancer upper back        Allergies:  Amoxicillin and Arimidex [anastrozole]     Social History:  Social History     Tobacco Use    Smoking status: Never    Smokeless tobacco: Never   Vaping Use    Vaping Use: Never used   Substance Use Topics    Alcohol use: Not Currently     Comment: occasional - wine or clara    Drug use: No        Family History:   family history includes Cancer (age of onset: 60) in her mother; Dementia in her mother; Diabetes (age of onset: 54) in her brother; Heart Attack in her brother, maternal grandfather, and sister; Heart Disease (age of onset: 52) in her sister; Hyperlipidemia in her mother.      PHYSICAL EXAM:   Vital signs: BP 94/58 (BP Location: Right arm, Patient Position: Sitting, BP Cuff Size: Adult)   Pulse 74   Ht 1.626 m (5' 4\")   Wt 75.8 kg (167 lb 1.6 oz)   LMP 10/30/2004   SpO2 94%   BMI 28.68 kg/m²   GENERAL: Well-developed, well-nourished in no apparent distress.   EYE:  No ocular asymmetry, PERRLA  HENT: Pink, moist mucous membranes.    NECK: No thyromegaly.   CARDIOVASCULAR:  No murmurs  LUNGS: Clear breath " sounds  ABDOMEN: Soft, nontender   EXTREMITIES: No clubbing, cyanosis, or edema.   NEUROLOGICAL: No gross focal motor abnormalities   LYMPH: No cervical adenopathy seen  SKIN: No rashes, lesions.       Labs:  Lab Results   Component Value Date/Time    SODIUM 142 2023 07:58 AM    POTASSIUM 4.6 2023 07:58 AM    CHLORIDE 106 2023 07:58 AM    CO2 25 2023 07:58 AM    ANION 11.0 2023 07:58 AM    GLUCOSE 89 2023 07:58 AM    BUN 22 2023 07:58 AM    CREATININE 0.77 2023 07:58 AM    CALCIUM 10.0 2023 07:58 AM    ASTSGOT 15 2023 07:58 AM    ALTSGPT 15 2023 07:58 AM    TBILIRUBIN 0.6 2023 07:58 AM    ALBUMIN 4.5 2023 07:58 AM    TOTPROTEIN 6.9 2023 07:58 AM    GLOBULIN 2.4 2023 07:58 AM    AGRATIO 1.9 2023 07:58 AM       Lab Results   Component Value Date/Time    SODIUM 140 2022 0710    POTASSIUM 4.6 2022 0710    CHLORIDE 106 2022 0710    CO2 24 2022 0710    GLUCOSE 105 (H) 2022 0710    BUN 20 2022 0710    CREATININE 0.80 2022 0710    CALCIUM 9.8 2022 0710    ANION 10.0 2022 0710       Lab Results   Component Value Date/Time    CHOLSTRLTOT 174 2021 0714    TRIGLYCERIDE 193 (H) 2021 0714    HDL 55 2021 0714    LDL 80 2021 0714       Lab Results   Component Value Date/Time    TSHULTRASEN 0.500 2022 0710     Lab Results   Component Value Date/Time    FREET4 1.36 2022 0710     Lab Results   Component Value Date/Time    FREET3 2.68 2022 0710     No results found for: THYSTIMIG    Lab Results   Component Value Date/Time    MICROSOMALA 0.5 2019 1433         Imagin/4/2022 4:49 PM     HISTORY/REASON FOR EXAM:  Pituitary adenoma, known or suspected        TECHNIQUE/EXAM DESCRIPTION:  MRI of the brain without and with contrast, pituitary protocol.     T1 3D TFE sagittal, T2 Drive Turbo spin-echo axial, and 3D FLAIR coronal images were  obtained of the whole brain. Thin-section images of the sella were obtained including: T2 BARRETT coronal, T1 or T1-weighted 3D BARRETT precontrast coronal, T1 or T1-weighted 3D   BARRETT postcontrast coronal, T1 or T1-weighted 3DTSE precontrast sagittal and T1 or T1-weighted 3D BARRETT postcontrast sagittal images. Optional T1-weighted dynamic contrast-infused coronal BARRETT images may also be obtained.     The study was performed on a [place] 3.0 [place] MRI scanner.     15 mL ProHance contrast was administered intravenously.     COMPARISON:  5/12/2021     FINDINGS:  The thin-section images of the sella show the pituitary to be of normal size and configuration. There is uniform enhancement. The pituitary stalk is in the midline. There are no suprasellar or parasellar mass lesions. The cavernous sinuses show normal   enhancement and configuration. Vascular flow voids in the juxtasellar carotid arteries are unremarkable.     A few nonspecific T2 hyperintensities in the subcortical and periventricular white matter. Mild cerebral volume loss is seen. There is no intra-axial space-occupying lesion. The ventricles, cortical sulci and the basal cisterns are prominent consistent   with mild cerebral volume loss. The visualized flow voids of the cerebral vasculature are unremarkable.  There is no large lesion identified in the expected course of the intracranial portions of the cranial nerves.     The skull bones are unremarkable.        IMPRESSION:     1.  There is no evidence of pituitary mass. There is been no significant interval change.  2.  Mild cerebral volume loss.  3.  Mild chronic microvascular ischemic disease.        ASSESSMENT/PLAN:     1. Primary hypothyroidism  Controlled  Continue levothyroxine 50 mcg daily  Reviewed proper administration of thyroid hormone  Patient should take thyroid hormone 30-60 minutes before breakfast on an empty stomach plain water and not take it together with food, iron, calcium, and antacids.  Iron,  calcium, and antacids should be taken at least 4 hours apart from thyroid hormone.  Repeat TSH levels in 12 months    2. Hyperprolactinemia (HCC)  This is resolved.  There is evidence of durability of cure with dopamine agonist therapy and success of withdrawal therapy  Will continue monitor prolactin levels annually    3. Macroprolactinemia  Resolved  Previously detected macroprolactinoma is no longer visible on her last MRI in September 2022  We will repeat her pituitary MRI if medically necessary based on prolactin level elevation    4. Vitamin D deficiency  Stable   Vitamin D labs were reviewed with patient  Continue current supplements  Continue monitoring levels       5. Other insomnia  Unstable  She has insomnia not explained by her normal TSH levels I want her to try trazodone  If she has a positive response to this medication I recommend that she get it from her primary care in the future      Return in about 1 year (around 1/3/2025).      Thank you kindly for allowing me to participate in the thyroid care plan for this patient.    Jesus Smith MD, FACE, Counts include 234 beds at the [facility]      CC:   Davidson Murillo M.D.

## 2024-01-05 DIAGNOSIS — Z85.820 HISTORY OF MALIGNANT MELANOMA: ICD-10-CM

## 2024-01-05 DIAGNOSIS — Z86.018 HISTORY OF PITUITARY ADENOMA: ICD-10-CM

## 2024-01-05 DIAGNOSIS — R79.89 MACROPROLACTINEMIA: ICD-10-CM

## 2024-01-05 DIAGNOSIS — R73.01 ELEVATED FASTING GLUCOSE: ICD-10-CM

## 2024-05-23 ENCOUNTER — APPOINTMENT (RX ONLY)
Dept: URBAN - METROPOLITAN AREA CLINIC 22 | Facility: CLINIC | Age: 69
Setting detail: DERMATOLOGY
End: 2024-05-23

## 2024-05-23 DIAGNOSIS — D18.0 HEMANGIOMA: ICD-10-CM

## 2024-05-23 DIAGNOSIS — Z85.820 PERSONAL HISTORY OF MALIGNANT MELANOMA OF SKIN: ICD-10-CM

## 2024-05-23 DIAGNOSIS — D22 MELANOCYTIC NEVI: ICD-10-CM

## 2024-05-23 DIAGNOSIS — Z85.828 PERSONAL HISTORY OF OTHER MALIGNANT NEOPLASM OF SKIN: ICD-10-CM

## 2024-05-23 DIAGNOSIS — L82.1 OTHER SEBORRHEIC KERATOSIS: ICD-10-CM

## 2024-05-23 DIAGNOSIS — L81.4 OTHER MELANIN HYPERPIGMENTATION: ICD-10-CM

## 2024-05-23 PROBLEM — D48.5 NEOPLASM OF UNCERTAIN BEHAVIOR OF SKIN: Status: ACTIVE | Noted: 2024-05-23

## 2024-05-23 PROBLEM — D18.01 HEMANGIOMA OF SKIN AND SUBCUTANEOUS TISSUE: Status: ACTIVE | Noted: 2024-05-23

## 2024-05-23 PROBLEM — D22.5 MELANOCYTIC NEVI OF TRUNK: Status: ACTIVE | Noted: 2024-05-23

## 2024-05-23 PROCEDURE — 11102 TANGNTL BX SKIN SINGLE LES: CPT

## 2024-05-23 PROCEDURE — ? BIOPSY BY SHAVE METHOD

## 2024-05-23 PROCEDURE — 99213 OFFICE O/P EST LOW 20 MIN: CPT | Mod: 25

## 2024-05-23 PROCEDURE — ? COUNSELING

## 2024-05-23 PROCEDURE — ? SUNSCREEN RECOMMENDATIONS

## 2024-05-23 ASSESSMENT — LOCATION DETAILED DESCRIPTION DERM
LOCATION DETAILED: RIGHT SUPERIOR MEDIAL UPPER BACK
LOCATION DETAILED: LEFT VENTRAL PROXIMAL FOREARM
LOCATION DETAILED: LEFT MID-UPPER BACK
LOCATION DETAILED: LEFT RADIAL DORSAL HAND
LOCATION DETAILED: LEFT SUPERIOR FLANK
LOCATION DETAILED: UPPER STERNUM
LOCATION DETAILED: LOWER STERNUM
LOCATION DETAILED: PERIUMBILICAL SKIN
LOCATION DETAILED: RIGHT POSTERIOR SHOULDER
LOCATION DETAILED: RIGHT LATERAL PROXIMAL UPPER ARM
LOCATION DETAILED: EPIGASTRIC SKIN
LOCATION DETAILED: RIGHT VENTRAL PROXIMAL FOREARM
LOCATION DETAILED: INFERIOR MID FOREHEAD
LOCATION DETAILED: RIGHT RADIAL DORSAL HAND
LOCATION DETAILED: INFERIOR THORACIC SPINE
LOCATION DETAILED: LEFT SOFT TRIANGLE OF THE NOSE

## 2024-05-23 ASSESSMENT — LOCATION SIMPLE DESCRIPTION DERM
LOCATION SIMPLE: UPPER BACK
LOCATION SIMPLE: RIGHT FOREARM
LOCATION SIMPLE: LEFT NOSE
LOCATION SIMPLE: LEFT HAND
LOCATION SIMPLE: CHEST
LOCATION SIMPLE: LEFT UPPER BACK
LOCATION SIMPLE: RIGHT HAND
LOCATION SIMPLE: INFERIOR FOREHEAD
LOCATION SIMPLE: LEFT FOREARM
LOCATION SIMPLE: RIGHT UPPER ARM
LOCATION SIMPLE: RIGHT SHOULDER
LOCATION SIMPLE: RIGHT UPPER BACK
LOCATION SIMPLE: ABDOMEN

## 2024-05-23 ASSESSMENT — LOCATION ZONE DERM
LOCATION ZONE: TRUNK
LOCATION ZONE: ARM
LOCATION ZONE: FACE
LOCATION ZONE: NOSE
LOCATION ZONE: HAND

## 2024-05-23 NOTE — PROCEDURE: SUNSCREEN RECOMMENDATIONS
Products Recommended: Anshul Arteaga \\nElta md UV clear
General Sunscreen Counseling: I recommended a broad spectrum sunscreen with a SPF of 30 or higher.  I explained that SPF 30 sunscreens block approximately 97 percent of the sun's harmful rays.  Sunscreens should be applied at least 15 minutes prior to expected sun exposure and then every 2 hours after that as long as sun exposure continues. If swimming or exercising sunscreen should be reapplied every 45 minutes to an hour after getting wet or sweating.  One ounce, or the equivalent of a shot glass full of sunscreen, is adequate to protect the skin not covered by a bathing suit. I also recommended a lip balm with a sunscreen as well. Sun protective clothing can be used in lieu of sunscreen but must be worn the entire time you are exposed to the sun's rays.
Detail Level: Zone

## 2024-08-17 DIAGNOSIS — Z78.0 POSTMENOPAUSAL STATUS: ICD-10-CM

## 2024-08-23 ENCOUNTER — HOSPITAL ENCOUNTER (OUTPATIENT)
Dept: RADIOLOGY | Facility: MEDICAL CENTER | Age: 69
End: 2024-08-23
Attending: FAMILY MEDICINE
Payer: MEDICARE

## 2024-08-23 DIAGNOSIS — Z78.0 POSTMENOPAUSAL STATUS: ICD-10-CM

## 2024-08-23 PROCEDURE — 77080 DXA BONE DENSITY AXIAL: CPT

## 2024-10-19 DIAGNOSIS — E03.9 PRIMARY HYPOTHYROIDISM: ICD-10-CM

## 2024-10-21 RX ORDER — LEVOTHYROXINE SODIUM 50 UG/1
50 TABLET ORAL
Qty: 90 TABLET | Refills: 3 | Status: SHIPPED | OUTPATIENT
Start: 2024-10-21

## 2024-11-19 ENCOUNTER — APPOINTMENT (RX ONLY)
Dept: URBAN - METROPOLITAN AREA CLINIC 22 | Facility: CLINIC | Age: 69
Setting detail: DERMATOLOGY
End: 2024-11-19

## 2024-11-19 DIAGNOSIS — D18.0 HEMANGIOMA: ICD-10-CM

## 2024-11-19 DIAGNOSIS — D22 MELANOCYTIC NEVI: ICD-10-CM

## 2024-11-19 DIAGNOSIS — L70.8 OTHER ACNE: ICD-10-CM

## 2024-11-19 DIAGNOSIS — L81.4 OTHER MELANIN HYPERPIGMENTATION: ICD-10-CM

## 2024-11-19 DIAGNOSIS — L82.1 OTHER SEBORRHEIC KERATOSIS: ICD-10-CM

## 2024-11-19 DIAGNOSIS — Z85.828 PERSONAL HISTORY OF OTHER MALIGNANT NEOPLASM OF SKIN: ICD-10-CM

## 2024-11-19 DIAGNOSIS — Z85.820 PERSONAL HISTORY OF MALIGNANT MELANOMA OF SKIN: ICD-10-CM

## 2024-11-19 PROBLEM — D22.5 MELANOCYTIC NEVI OF TRUNK: Status: ACTIVE | Noted: 2024-11-19

## 2024-11-19 PROBLEM — D18.01 HEMANGIOMA OF SKIN AND SUBCUTANEOUS TISSUE: Status: ACTIVE | Noted: 2024-11-19

## 2024-11-19 PROCEDURE — 99213 OFFICE O/P EST LOW 20 MIN: CPT

## 2024-11-19 PROCEDURE — ? COUNSELING

## 2024-11-19 PROCEDURE — ? SUNSCREEN RECOMMENDATIONS

## 2024-11-19 PROCEDURE — ? EXTRACTIONS

## 2024-11-19 ASSESSMENT — LOCATION DETAILED DESCRIPTION DERM
LOCATION DETAILED: LEFT SUPERIOR FLANK
LOCATION DETAILED: INFERIOR THORACIC SPINE
LOCATION DETAILED: LEFT CENTRAL ZYGOMA
LOCATION DETAILED: RIGHT LATERAL PROXIMAL UPPER ARM
LOCATION DETAILED: RIGHT FOREHEAD
LOCATION DETAILED: PERIUMBILICAL SKIN
LOCATION DETAILED: RIGHT LATERAL TEMPLE
LOCATION DETAILED: LEFT SOFT TRIANGLE OF THE NOSE
LOCATION DETAILED: RIGHT SUPERIOR CENTRAL MALAR CHEEK
LOCATION DETAILED: INFERIOR MID FOREHEAD
LOCATION DETAILED: LEFT VENTRAL PROXIMAL FOREARM
LOCATION DETAILED: LOWER STERNUM
LOCATION DETAILED: RIGHT RADIAL DORSAL HAND
LOCATION DETAILED: RIGHT VENTRAL PROXIMAL FOREARM
LOCATION DETAILED: LEFT MID-UPPER BACK
LOCATION DETAILED: UPPER STERNUM
LOCATION DETAILED: RIGHT SUPERIOR MEDIAL UPPER BACK
LOCATION DETAILED: RIGHT INFERIOR CENTRAL MALAR CHEEK
LOCATION DETAILED: LEFT RADIAL DORSAL HAND
LOCATION DETAILED: EPIGASTRIC SKIN

## 2024-11-19 ASSESSMENT — LOCATION SIMPLE DESCRIPTION DERM
LOCATION SIMPLE: RIGHT CHEEK
LOCATION SIMPLE: LEFT ZYGOMA
LOCATION SIMPLE: UPPER BACK
LOCATION SIMPLE: CHEST
LOCATION SIMPLE: RIGHT FOREARM
LOCATION SIMPLE: RIGHT TEMPLE
LOCATION SIMPLE: RIGHT UPPER ARM
LOCATION SIMPLE: LEFT HAND
LOCATION SIMPLE: LEFT NOSE
LOCATION SIMPLE: ABDOMEN
LOCATION SIMPLE: LEFT FOREARM
LOCATION SIMPLE: RIGHT UPPER BACK
LOCATION SIMPLE: RIGHT HAND
LOCATION SIMPLE: RIGHT FOREHEAD
LOCATION SIMPLE: LEFT UPPER BACK
LOCATION SIMPLE: INFERIOR FOREHEAD

## 2024-11-19 ASSESSMENT — LOCATION ZONE DERM
LOCATION ZONE: ARM
LOCATION ZONE: FACE
LOCATION ZONE: NOSE
LOCATION ZONE: HAND
LOCATION ZONE: TRUNK

## 2024-11-19 NOTE — PROCEDURE: EXTRACTIONS
Acne Type: Comedonal Lesions
Post-Care Instructions: I reviewed with the patient in detail post-care instructions. Patient is to keep the treatment areas dry overnight, and then apply bacitracin twice daily until healed. Patient may apply hydrogen peroxide soaks to remove any crusting.
Extraction Method: 11 blade and q-tip
Render Number Of Lesions Treated: yes
Hemostasis: Pressure
Render Post-Care Instructions In Note?: no
Consent was obtained and risks were reviewed including but not limited to scarring, infection, bleeding, scabbing, incomplete removal, and allergy to anesthesia.
Prep Text (Optional): Prior to removal the treatment areas were prepped in the usual fashion with alcohol cleanser
Detail Level: Detailed

## 2024-12-24 ENCOUNTER — HOSPITAL ENCOUNTER (OUTPATIENT)
Dept: LAB | Facility: MEDICAL CENTER | Age: 69
End: 2024-12-24
Attending: INTERNAL MEDICINE
Payer: MEDICARE

## 2024-12-24 DIAGNOSIS — R79.89 MACROPROLACTINEMIA: ICD-10-CM

## 2024-12-24 DIAGNOSIS — G47.09 OTHER INSOMNIA: ICD-10-CM

## 2024-12-24 DIAGNOSIS — E55.9 VITAMIN D DEFICIENCY: ICD-10-CM

## 2024-12-24 DIAGNOSIS — E03.9 PRIMARY HYPOTHYROIDISM: ICD-10-CM

## 2024-12-24 DIAGNOSIS — E22.1 HYPERPROLACTINEMIA (HCC): ICD-10-CM

## 2024-12-24 LAB
25(OH)D3 SERPL-MCNC: 32 NG/ML (ref 30–100)
ALBUMIN SERPL BCP-MCNC: 4.3 G/DL (ref 3.2–4.9)
ALBUMIN/GLOB SERPL: 1.9 G/DL
ALP SERPL-CCNC: 67 U/L (ref 30–99)
ALT SERPL-CCNC: 17 U/L (ref 2–50)
ANION GAP SERPL CALC-SCNC: 12 MMOL/L (ref 7–16)
AST SERPL-CCNC: 15 U/L (ref 12–45)
BILIRUB SERPL-MCNC: 0.4 MG/DL (ref 0.1–1.5)
BUN SERPL-MCNC: 16 MG/DL (ref 8–22)
CALCIUM ALBUM COR SERPL-MCNC: 9.4 MG/DL (ref 8.5–10.5)
CALCIUM SERPL-MCNC: 9.6 MG/DL (ref 8.5–10.5)
CHLORIDE SERPL-SCNC: 106 MMOL/L (ref 96–112)
CO2 SERPL-SCNC: 22 MMOL/L (ref 20–33)
CREAT SERPL-MCNC: 0.77 MG/DL (ref 0.5–1.4)
GFR SERPLBLD CREATININE-BSD FMLA CKD-EPI: 83 ML/MIN/1.73 M 2
GLOBULIN SER CALC-MCNC: 2.3 G/DL (ref 1.9–3.5)
GLUCOSE SERPL-MCNC: 99 MG/DL (ref 65–99)
POTASSIUM SERPL-SCNC: 4.2 MMOL/L (ref 3.6–5.5)
PROLACTIN SERPL-MCNC: 21.8 NG/ML (ref 2.8–26)
PROT SERPL-MCNC: 6.6 G/DL (ref 6–8.2)
SODIUM SERPL-SCNC: 140 MMOL/L (ref 135–145)
T4 FREE SERPL-MCNC: 1.02 NG/DL (ref 0.93–1.7)
TSH SERPL-ACNC: 5.33 UIU/ML (ref 0.35–5.5)

## 2024-12-24 PROCEDURE — 84146 ASSAY OF PROLACTIN: CPT

## 2024-12-24 PROCEDURE — 36415 COLL VENOUS BLD VENIPUNCTURE: CPT

## 2024-12-24 PROCEDURE — 84439 ASSAY OF FREE THYROXINE: CPT

## 2024-12-24 PROCEDURE — 80053 COMPREHEN METABOLIC PANEL: CPT

## 2024-12-24 PROCEDURE — 82306 VITAMIN D 25 HYDROXY: CPT

## 2024-12-24 PROCEDURE — 84443 ASSAY THYROID STIM HORMONE: CPT

## 2025-01-06 ENCOUNTER — OFFICE VISIT (OUTPATIENT)
Dept: ENDOCRINOLOGY | Facility: MEDICAL CENTER | Age: 70
End: 2025-01-06
Attending: INTERNAL MEDICINE
Payer: MEDICARE

## 2025-01-06 VITALS
HEART RATE: 70 BPM | DIASTOLIC BLOOD PRESSURE: 62 MMHG | HEIGHT: 64 IN | BODY MASS INDEX: 28.17 KG/M2 | OXYGEN SATURATION: 93 % | SYSTOLIC BLOOD PRESSURE: 104 MMHG | WEIGHT: 165 LBS

## 2025-01-06 DIAGNOSIS — E03.9 PRIMARY HYPOTHYROIDISM: ICD-10-CM

## 2025-01-06 DIAGNOSIS — E22.1 HYPERPROLACTINEMIA (HCC): ICD-10-CM

## 2025-01-06 DIAGNOSIS — E55.9 VITAMIN D DEFICIENCY: ICD-10-CM

## 2025-01-06 PROCEDURE — 99214 OFFICE O/P EST MOD 30 MIN: CPT | Performed by: INTERNAL MEDICINE

## 2025-01-06 PROCEDURE — 3074F SYST BP LT 130 MM HG: CPT | Performed by: INTERNAL MEDICINE

## 2025-01-06 PROCEDURE — 99211 OFF/OP EST MAY X REQ PHY/QHP: CPT | Performed by: INTERNAL MEDICINE

## 2025-01-06 PROCEDURE — 3078F DIAST BP <80 MM HG: CPT | Performed by: INTERNAL MEDICINE

## 2025-01-06 RX ORDER — LEVOTHYROXINE SODIUM 75 UG/1
75 TABLET ORAL
Qty: 90 TABLET | Refills: 1 | Status: SHIPPED | OUTPATIENT
Start: 2025-01-06

## 2025-01-06 ASSESSMENT — FIBROSIS 4 INDEX: FIB4 SCORE: 1.25

## 2025-01-06 NOTE — PROGRESS NOTES
Chief Complaint: Follow up for Hyperprolactinemia due to a Macroprolactinoma,  resolved.   Hypothyroidism.     HPI:     Gerri Castelan is a 69 y.o. female here for follow up of the above medical issue.  She was initially discovered to have a pituitary macroadenoma producing prolactin after she had some memory problems.  Initial MRI showed a 26 mm macroadenoma and her baseline prolactin was 889 on October 2018.    Interestingly she was started on bromocriptine by another endocrinologist instead of cabergoline but she responded to the medication and has tolerated without side effects.    After I saw her I placed on cabergoline to replace bromocriptine as it is superior and more effective/  Her pituitary MRI  on 9/2022 showed resolution of the pituitary tumor.  A trial of withdrawal of cabergoline in Nov 2022 was successful as her prolactin stayed normal.       Her last prolactin is stable at 21 on December 2024      She has primary hypothyroidism that was diagnosed over 10 years ago and this predates the diagnosis of her prior hyperprolactinemia.    She has remained on levothyroxine 50 MCG daily which has been her dose for at least 6 mos.  Previously TSH was 0.170 on 5/2023 while on Levothyroxine 75 mcg  She denies palpitations and anxiety  She still reports problems falling and staying asleep  .  TSH is 5.3 on December 2024 (50mcg)  Her TSH is 3.1 on 12/2023 (50 mcg)  Her TSH was 0.170 on 5/2023  (75 mcg)  Her TSH was 0.5 on July 2022        Patient's medications, allergies, and social histories were reviewed and updated as appropriate.      ROS:     CONS:     No fever, no chills   EYES:     No diplopia, no blurry vision   CV:           No chest pain, no palpitations   PULM:     No SOB, no cough, no hemoptysis.   GI:            No nausea, no vomiting, no diarrhea, no constipation   ENDO:     No polyuria, no polydipsia, no heat intolerance, no cold intolerance       Past Medical History:  Problem  List:  2023-08: Cerebral atrophy, mild (HCC)  2023-02: History of basal cell carcinoma (BCC) of skin  2023-02: History of pituitary adenoma  2023-02: Vasomotor symptoms due to menopause  2020-03: History of malignant melanoma  2020-03: History of left breast cancer  2020-03: History of bilateral mastectomy  2020-03: Grade II hemorrhoids  2020-03: Menopausal symptom  2019-05: Acute infective otitis externa, right  2018-10: Incidentally-noted pituitary tumor  2018-09: Pituitary adenoma with extrasellar extension (HCC)  2018-09: Pituitary macroadenoma (HCC)  2018-09: Amnesia  2018-09: Hypothyroidism  2016-12: Elevated fasting glucose  2016-11: Abnormal echocardiogram  2016-10: Family history of early CAD  2016-10: History of TIA (transient ischemic attack)  2016-09: Dyslipidemia  2016-09: Primary hypothyroidism  2016-09: Altered mental status  2016-09: Chronic right shoulder pain  2016-09: Frequent loose stools  2016-03: Macroprolactinemia  2012-12: Enlarged gallbladder  2012-11: Gallbladder polyp  2011-02: Melanoma (HCC)  2010-12: Family history of breast cancer in mother  2009-05: GERD (gastroesophageal reflux disease)  Personal history of breast cancer  Chronic migraine without aura without status migrainosus, not   intractable  Prolactinoma (HCC)  Hyperprolactinemia (HCC)      Past Surgical History:  Past Surgical History:   Procedure Laterality Date    ANAIS BY LAPAROSCOPY  12/6/2012    Performed by Yessica Ocampo M.D. at SURGERY [facility] ORS    NIPPLE RECONSTRUCTION  12/1/2011    Performed by PATEL DALTON at [facility] ORS    FLAP GRAFT  12/1/2011    Performed by PATEL DALTON at [facility] ORS    BREAST RECONSTRUCTION  7/15/2011    Performed by PATEL DALTON at [facility] ORS    TISSUE EXPANDER PLACE/REMOVE  7/15/2011    Performed by PATEL DALTON at [facility] ORS    BREAST IMPLANT REVISION  7/15/2011    Performed by PATEL DALTON at [facility]  "[facility] ORS    CAPSULECTOMY  7/15/2011    Performed by PATEL DALTON at SURGERY [facility] ORS    TISSUE TRANSFER/REARRANGE  7/15/2011    Performed by PATEL DALTON at SURGERY [facility] ORS    MASTECTOMY  3/29/2011    Performed by HANNAH LEVIN at SURGERY [facility] ORS    BREAST RECONSTRUCTION  3/29/2011    Performed by PATEL DALTON at SURGERY [facility] ORS    TISSUE EXPANDER PLACE/REMOVE  3/29/2011    Performed by PATEL DALTON at SURGERY [facility] ORS    BREAST BIOPSY  2/24/2011    Performed by HANNAH LEVIN at SURGERY [facility] ORS    LUMPECTOMY  1/19/2011    Performed by HANNAH LEVIN at SURGERY SAME DAY [facility] ORS    NODE BIOPSY SENTINEL  1/19/2011    Performed by HANNAH LEVIN at SURGERY [facility] DAY [facility] ORS    COLONOSCOPY  2/2005    normal, due 2015    OTHER ORTHOPEDIC SURGERY  1997    knee surgery ACL repair    TUBAL LIGATION  1987    OTHER  1983    malignant melanoma skin cancer upper back        Allergies:  Amoxicillin and Arimidex [anastrozole]     Social History:  Social History     Tobacco Use    Smoking status: Never    Smokeless tobacco: Never   Vaping Use    Vaping status: Never Used   Substance Use Topics    Alcohol use: Not Currently     Comment: occasional - wine or clara    Drug use: No        Family History:   family history includes Cancer (age of onset: 60) in her mother; Dementia in her mother; Diabetes (age of onset: 54) in her brother; Heart Attack in her brother, maternal grandfather, and sister; Heart Disease (age of onset: 52) in her sister; Hyperlipidemia in her mother.      PHYSICAL EXAM:   Vital signs: /62 (BP Location: Left arm, Patient Position: Sitting, BP Cuff Size: Adult long)   Pulse 70   Ht 1.626 m (5' 4\")   Wt 74.8 kg (165 lb)   LMP 10/30/2004   SpO2 93%   BMI 28.32 kg/m²   GENERAL: Well-developed, well-nourished in no apparent distress.   EYE:  No ocular asymmetry, PERRLA  HENT: Pink, moist mucous membranes.    NECK: No " thyromegaly.   CARDIOVASCULAR:  No murmurs  LUNGS: Clear breath sounds  ABDOMEN: Soft, nontender   EXTREMITIES: No clubbing, cyanosis, or edema.   NEUROLOGICAL: No gross focal motor abnormalities   LYMPH: No cervical adenopathy seen  SKIN: No rashes, lesions.       Labs:  Lab Results   Component Value Date/Time    SODIUM 140 2024 07:46 AM    POTASSIUM 4.2 2024 07:46 AM    CHLORIDE 106 2024 07:46 AM    CO2 22 2024 07:46 AM    ANION 12.0 2024 07:46 AM    GLUCOSE 99 2024 07:46 AM    BUN 16 2024 07:46 AM    CREATININE 0.77 2024 07:46 AM    CALCIUM 9.6 2024 07:46 AM    ASTSGOT 15 2024 07:46 AM    ALTSGPT 17 2024 07:46 AM    TBILIRUBIN 0.4 2024 07:46 AM    ALBUMIN 4.3 2024 07:46 AM    TOTPROTEIN 6.6 2024 07:46 AM    GLOBULIN 2.3 2024 07:46 AM    AGRATIO 1.9 2024 07:46 AM       Lab Results   Component Value Date/Time    SODIUM 140 2022 0710    POTASSIUM 4.6 2022 0710    CHLORIDE 106 2022 0710    CO2 24 2022 0710    GLUCOSE 105 (H) 2022 0710    BUN 20 2022 0710    CREATININE 0.80 2022 0710    CALCIUM 9.8 2022 0710    ANION 10.0 2022 0710       Lab Results   Component Value Date/Time    CHOLSTRLTOT 174 2021 0714    TRIGLYCERIDE 193 (H) 2021 0714    HDL 55 2021 0714    LDL 80 2021 0714       Lab Results   Component Value Date/Time    TSHULTRASEN 0.500 2022 0710     Lab Results   Component Value Date/Time    FREET4 1.36 2022 0710     Lab Results   Component Value Date/Time    FREET3 2.68 2022 0710     No results found for: THYSTIMIG    Lab Results   Component Value Date/Time    MICROSOMALA 0.5 2019 1433         Imagin/4/2022 4:49 PM     HISTORY/REASON FOR EXAM:  Pituitary adenoma, known or suspected        TECHNIQUE/EXAM DESCRIPTION:  MRI of the brain without and with contrast, pituitary protocol.     T1 3D TFE sagittal, T2  Drive Turbo spin-echo axial, and 3D FLAIR coronal images were obtained of the whole brain. Thin-section images of the sella were obtained including: T2 BARRETT coronal, T1 or T1-weighted 3D BARRETT precontrast coronal, T1 or T1-weighted 3D   BARRETT postcontrast coronal, T1 or T1-weighted 3DTSE precontrast sagittal and T1 or T1-weighted 3D BARRETT postcontrast sagittal images. Optional T1-weighted dynamic contrast-infused coronal BARRETT images may also be obtained.     The study was performed on a [employer] 3.0 Morelia MRI scanner.     15 mL ProHance contrast was administered intravenously.     COMPARISON:  5/12/2021     FINDINGS:  The thin-section images of the sella show the pituitary to be of normal size and configuration. There is uniform enhancement. The pituitary stalk is in the midline. There are no suprasellar or parasellar mass lesions. The cavernous sinuses show normal   enhancement and configuration. Vascular flow voids in the juxtasellar carotid arteries are unremarkable.     A few nonspecific T2 hyperintensities in the subcortical and periventricular white matter. Mild cerebral volume loss is seen. There is no intra-axial space-occupying lesion. The ventricles, cortical sulci and the basal cisterns are prominent consistent   with mild cerebral volume loss. The visualized flow voids of the cerebral vasculature are unremarkable.  There is no large lesion identified in the expected course of the intracranial portions of the cranial nerves.     The skull bones are unremarkable.        IMPRESSION:     1.  There is no evidence of pituitary mass. There is been no significant interval change.  2.  Mild cerebral volume loss.  3.  Mild chronic microvascular ischemic disease.        ASSESSMENT/PLAN:     1. Primary hypothyroidism  Uncontrolled  TSH is suboptimal at 5  Adjust levothyroxine to 75 mcg daily  Reviewed proper administration of thyroid hormone  Patient should take thyroid hormone 30-60 minutes before breakfast on an empty  stomach plain water and not take it together with food, iron, calcium, and antacids.  Iron, calcium, and antacids should be taken at least 4 hours apart from thyroid hormone.  Repeat TSH levels in 3 to 6 months        2. Hyperprolactinemia (HCC)  This is resolved.  There is evidence of durability of cure with dopamine agonist therapy and success of withdrawal therapy  Her last MRI showed no visible pituitary tumor  Will continue monitor prolactin levels annually      4. Vitamin D deficiency  Stable   Vitamin D labs were reviewed with patient  Continue current supplements  Continue monitoring levels         Return in about 6 months (around 7/6/2025).      Thank you kindly for allowing me to participate in the thyroid care plan for this patient.    Jesus Smith MD, FACE, [facility]      CC:   Davidson Murillo M.D.

## 2025-01-20 DIAGNOSIS — E78.5 DYSLIPIDEMIA: ICD-10-CM

## 2025-01-21 RX ORDER — ATORVASTATIN CALCIUM 20 MG/1
20 TABLET, FILM COATED ORAL
Qty: 90 TABLET | Refills: 0 | Status: SHIPPED | OUTPATIENT
Start: 2025-01-21

## 2025-01-21 NOTE — TELEPHONE ENCOUNTER
Received request via: Pharmacy    Was the patient seen in the last year in this department? Yes    Does the patient have an active prescription (recently filled or refills available) for medication(s) requested? No    Pharmacy Name:   To be filled at: [facility] [address]              Does the patient have USP Plus and need 100-day supply? (This applies to ALL medications) Patient does not have SCP

## 2025-02-19 ENCOUNTER — OFFICE VISIT (OUTPATIENT)
Dept: MEDICAL GROUP | Facility: MEDICAL CENTER | Age: 70
End: 2025-02-19
Payer: MEDICARE

## 2025-02-19 VITALS
TEMPERATURE: 97.7 F | HEART RATE: 78 BPM | DIASTOLIC BLOOD PRESSURE: 46 MMHG | SYSTOLIC BLOOD PRESSURE: 112 MMHG | OXYGEN SATURATION: 96 % | HEIGHT: 64 IN | BODY MASS INDEX: 28.17 KG/M2 | WEIGHT: 165 LBS

## 2025-02-19 DIAGNOSIS — G31.9 CEREBRAL ATROPHY, MILD (HCC): ICD-10-CM

## 2025-02-19 DIAGNOSIS — R73.01 ELEVATED FASTING GLUCOSE: ICD-10-CM

## 2025-02-19 DIAGNOSIS — E03.9 PRIMARY HYPOTHYROIDISM: ICD-10-CM

## 2025-02-19 DIAGNOSIS — Z12.11 COLON CANCER SCREENING: ICD-10-CM

## 2025-02-19 DIAGNOSIS — K21.9 GASTROESOPHAGEAL REFLUX DISEASE WITHOUT ESOPHAGITIS: ICD-10-CM

## 2025-02-19 DIAGNOSIS — Z86.16 HISTORY OF 2019 NOVEL CORONAVIRUS DISEASE (COVID-19): ICD-10-CM

## 2025-02-19 DIAGNOSIS — M25.561 RIGHT ANTERIOR KNEE PAIN: ICD-10-CM

## 2025-02-19 DIAGNOSIS — E78.5 DYSLIPIDEMIA: ICD-10-CM

## 2025-02-19 PROCEDURE — 99214 OFFICE O/P EST MOD 30 MIN: CPT | Performed by: FAMILY MEDICINE

## 2025-02-19 PROCEDURE — 3074F SYST BP LT 130 MM HG: CPT | Performed by: FAMILY MEDICINE

## 2025-02-19 PROCEDURE — 3078F DIAST BP <80 MM HG: CPT | Performed by: FAMILY MEDICINE

## 2025-02-19 RX ORDER — ATORVASTATIN CALCIUM 20 MG/1
20 TABLET, FILM COATED ORAL
Qty: 90 TABLET | Refills: 3 | Status: SHIPPED | OUTPATIENT
Start: 2025-02-19

## 2025-02-19 RX ORDER — ATORVASTATIN CALCIUM 20 MG/1
20 TABLET, FILM COATED ORAL
Qty: 90 TABLET | Refills: 3 | Status: SHIPPED | OUTPATIENT
Start: 2025-02-19 | End: 2025-02-19 | Stop reason: SDUPTHER

## 2025-02-19 RX ORDER — LEVOTHYROXINE SODIUM 75 UG/1
75 TABLET ORAL
Qty: 90 TABLET | Refills: 1 | Status: SHIPPED | OUTPATIENT
Start: 2025-02-19

## 2025-02-19 ASSESSMENT — PATIENT HEALTH QUESTIONNAIRE - PHQ9: CLINICAL INTERPRETATION OF PHQ2 SCORE: 0

## 2025-02-19 NOTE — PROGRESS NOTES
Chief Complaint   Patient presents with    Knee Pain     Right side     Dyslipidemia    Elevated Glucose       Subjective:     HPI:   Gerri Castelan presents today with the followin. Right anterior knee pain  She is having increasing pain in the right anterior and lateral knee.  She has had minimal swelling.  What she notes most is giving way and instability of the knee.  Knee x-ray order discussed and placed.  However, may be a cartilage issue.    2. Dyslipidemia  Patient denies chest pain, chest pressure, palpitations or exertional shortness of breath. Patient denies muscle aches or muscle weakness from the atorvastatin medication. Patient is a never smoker. Patient takes no aspirin daily. Patient has no history of myocardial infarction, stroke or PVD.  Lab orders discussed and placed.    3. Primary hypothyroidism  Followed by endocrinology, dose of levothyroxine recently increased.    4. Cerebral atrophy, mild (HCC)  Seen incidentally, age congruent.  Not clinically problematic.    5. History of 2019 novel coronavirus disease (COVID-19)  Became ill this last July.  Did well.  Will consider COVID booster.    6. Colon cancer screening  Normal colonoscopy in the past.  No family history of colon cancer.  Denies change in bowel pattern or blood in the stool.  Cologuard order placed.    7. Elevated fasting glucose  Has been mild.  No thirst or frequent urination.  Follow up orders discussed and placed.  - HEMOGLOBIN A1C; Future    8. Gastroesophageal reflux disease without esophagitis  The patient feels the current medication regimen of famotidine is controlling the gastroesophageal reflux symptoms well. Denies dysphagia, reflux symptoms, acidity, abdominal pain or visible blood or mucus in the stool. Denies vomiting or hematemesis. Denies burping or abdominal bloating. Patient avoids nonsteroidal anti-inflammatory drugs. Avoids heavy meals or eating within 2 hours of bedtime.  Lab orders placed.  - CBC  "WITHOUT DIFFERENTIAL; Future      Patient Active Problem List    Diagnosis Date Noted    History of 2019 novel coronavirus disease (COVID-19) 02/19/2025    Right anterior knee pain 02/19/2025    Cerebral atrophy, mild (HCC) 08/18/2023    History of basal cell carcinoma (BCC) of skin 02/10/2023    History of pituitary adenoma 02/10/2023    Vasomotor symptoms due to menopause 02/10/2023    History of malignant melanoma 03/09/2020    History of left breast cancer 03/09/2020    History of bilateral mastectomy 03/09/2020    Grade II hemorrhoids 03/09/2020    Menopausal symptom 03/09/2020    Family history of early CAD 10/17/2016    Dyslipidemia 09/26/2016    Primary hypothyroidism 09/26/2016    Chronic right shoulder pain 09/22/2016    Macroprolactinemia 03/22/2016    Personal history of breast cancer     Family history of breast cancer in mother 12/01/2010    GERD (gastroesophageal reflux disease) 05/19/2009       Current medicines (including changes today)  Current Outpatient Medications   Medication Sig Dispense Refill    atorvastatin (LIPITOR) 20 MG Tab Take 1 Tablet by mouth at bedtime. 90 Tablet 3    levothyroxine (SYNTHROID) 75 MCG Tab Take 1 Tablet by mouth every morning on an empty stomach. 90 Tablet 1    famotidine (PEPCID) 40 MG Tab       Cyanocobalamin (VITAMIN B-12) 5000 MCG SL Tab Place  under tongue.      VITAMIN D PO Take 5,000 Units by mouth every day.       No current facility-administered medications for this visit.       Allergies   Allergen Reactions    Amoxicillin      Headache      Arimidex [Anastrozole] Swelling       ROS: As per HPI       Objective:     /46   Pulse 78   Temp 36.5 °C (97.7 °F) (Temporal)   Ht 1.626 m (5' 4\")   Wt 74.8 kg (165 lb)   SpO2 96%  Body mass index is 28.32 kg/m².    Physical Exam:  Constitutional: Well-developed and well-nourished. Not diaphoretic. No distress. Lucid and fluent.  Skin: Skin is warm and dry. No rash noted.  Head: Atraumatic without " lesions.  Eyes: Conjunctivae and extraocular motions are normal. Pupils are equal, round, and reactive to light. No scleral icterus.   Ears:  External ears unremarkable.   Neck: Supple, trachea midline. No thyromegaly present. No cervical or supraclavicular lymphadenopathy. No JVD or carotid bruits appreciated  Cardiovascular: Regular rate and rhythm.  Normal S1, S2 without murmur appreciated.  Chest: Effort normal. Clear to auscultation throughout. No adventitious sounds.   Abdomen: Soft, non tender, and without distention. Active bowel sounds in all four quadrants. No rebound, guarding, masses or hepatosplenomegaly.  Extremities: No cyanosis, clubbing, erythema, nor edema.  No swelling.  No instability to examination today.  Neurological: Alert and oriented x 3. No tremor appreciated.    Psychiatric:  Behavior, mood, and affect are appropriate.       Assessment and Plan:     69 y.o. female with the following issues:    1. Right anterior knee pain  DX-KNEE COMPLETE 4+ RIGHT      2. Dyslipidemia  atorvastatin (LIPITOR) 20 MG Tab    Lipid Profile    DISCONTINUED: atorvastatin (LIPITOR) 20 MG Tab      3. Primary hypothyroidism  levothyroxine (SYNTHROID) 75 MCG Tab      4. Cerebral atrophy, mild (HCC)        5. History of 2019 novel coronavirus disease (COVID-19)        6. Colon cancer screening  Cologuard® colon cancer screening      7. Elevated fasting glucose  HEMOGLOBIN A1C      8. Gastroesophageal reflux disease without esophagitis  CBC WITHOUT DIFFERENTIAL            Followup: Return in about 6 months (around 8/19/2025), or if symptoms worsen or fail to improve.

## 2025-03-05 ENCOUNTER — HOSPITAL ENCOUNTER (OUTPATIENT)
Dept: RADIOLOGY | Facility: MEDICAL CENTER | Age: 70
End: 2025-03-05
Attending: FAMILY MEDICINE
Payer: MEDICARE

## 2025-03-05 DIAGNOSIS — M25.561 RIGHT ANTERIOR KNEE PAIN: ICD-10-CM

## 2025-03-05 PROCEDURE — 73564 X-RAY EXAM KNEE 4 OR MORE: CPT | Mod: RT

## 2025-03-10 ENCOUNTER — RESULTS FOLLOW-UP (OUTPATIENT)
Dept: MEDICAL GROUP | Facility: MEDICAL CENTER | Age: 70
End: 2025-03-10
Payer: MEDICARE

## 2025-03-14 DIAGNOSIS — M17.11 TRICOMPARTMENT OSTEOARTHRITIS OF RIGHT KNEE: ICD-10-CM

## 2025-03-14 LAB — NONINV COLON CA DNA+OCC BLD SCRN STL QL: NEGATIVE

## 2025-03-17 ENCOUNTER — HOSPITAL ENCOUNTER (OUTPATIENT)
Dept: LAB | Facility: MEDICAL CENTER | Age: 70
End: 2025-03-17
Attending: FAMILY MEDICINE
Payer: MEDICARE

## 2025-03-17 DIAGNOSIS — K21.9 GASTROESOPHAGEAL REFLUX DISEASE WITHOUT ESOPHAGITIS: ICD-10-CM

## 2025-03-17 DIAGNOSIS — E78.5 DYSLIPIDEMIA: ICD-10-CM

## 2025-03-17 DIAGNOSIS — R73.01 ELEVATED FASTING GLUCOSE: ICD-10-CM

## 2025-03-17 LAB
CHOLEST SERPL-MCNC: 165 MG/DL (ref 100–199)
ERYTHROCYTE [DISTWIDTH] IN BLOOD BY AUTOMATED COUNT: 45.1 FL (ref 35.9–50)
EST. AVERAGE GLUCOSE BLD GHB EST-MCNC: 123 MG/DL
FASTING STATUS PATIENT QL REPORTED: NORMAL
HBA1C MFR BLD: 5.9 % (ref 4–5.6)
HCT VFR BLD AUTO: 45.6 % (ref 37–47)
HDLC SERPL-MCNC: 57 MG/DL
HGB BLD-MCNC: 14.6 G/DL (ref 12–16)
LDLC SERPL CALC-MCNC: 78 MG/DL
MCH RBC QN AUTO: 30.8 PG (ref 27–33)
MCHC RBC AUTO-ENTMCNC: 32 G/DL (ref 32.2–35.5)
MCV RBC AUTO: 96.2 FL (ref 81.4–97.8)
PLATELET # BLD AUTO: 201 K/UL (ref 164–446)
PMV BLD AUTO: 11.4 FL (ref 9–12.9)
RBC # BLD AUTO: 4.74 M/UL (ref 4.2–5.4)
TRIGL SERPL-MCNC: 152 MG/DL (ref 0–149)
WBC # BLD AUTO: 5.6 K/UL (ref 4.8–10.8)

## 2025-03-17 PROCEDURE — 36415 COLL VENOUS BLD VENIPUNCTURE: CPT | Mod: GA

## 2025-03-17 PROCEDURE — 85027 COMPLETE CBC AUTOMATED: CPT

## 2025-03-17 PROCEDURE — 83036 HEMOGLOBIN GLYCOSYLATED A1C: CPT | Mod: GA

## 2025-03-17 PROCEDURE — 80061 LIPID PANEL: CPT

## 2025-03-20 ENCOUNTER — RESULTS FOLLOW-UP (OUTPATIENT)
Dept: MEDICAL GROUP | Facility: MEDICAL CENTER | Age: 70
End: 2025-03-20

## 2025-03-20 NOTE — Clinical Note
REFERRAL APPROVAL NOTICE         Sent on March 19, 2025                   Gerri Castelan  [address] [place] [address]                   Dear Ms. Castelan,    After a careful review of the medical information and benefit coverage, [facility] has processed your referral. See below for additional details.    If applicable, you must be actively enrolled with your insurance for coverage of the authorized service. If you have any questions regarding your coverage, please contact your insurance directly.    REFERRAL INFORMATION   Referral #:  [id number]  Referred-To Department    Referred-By Provider:  Physical Therapy    Davidson Murillo M.D.   [facility]      [address] [facility] [address] [place] [id number]  [phone] [address] [facility] [address]  [phone]    Referral Start Date:  03/14/2025  Referral End Date:   03/14/2026             SCHEDULING  If you do not already have an appointment, please call [phone] to make an appointment.     MORE INFORMATION  If you do not already have a [facility] account, sign up at: [email or web address]  You can access your medical information, make appointments, see lab results, billing information, and more.  If you have questions regarding this referral, please contact  the [facility] Referrals department at:             [phone]. Monday - Friday 8:00AM - 5:00PM.     Sincerely,    [facility]

## 2025-05-12 ENCOUNTER — OFFICE VISIT (OUTPATIENT)
Dept: MEDICAL GROUP | Facility: PHYSICIAN GROUP | Age: 70
End: 2025-05-12
Payer: MEDICARE

## 2025-05-12 VITALS
BODY MASS INDEX: 27.38 KG/M2 | DIASTOLIC BLOOD PRESSURE: 58 MMHG | WEIGHT: 160.38 LBS | HEART RATE: 62 BPM | TEMPERATURE: 99.2 F | SYSTOLIC BLOOD PRESSURE: 102 MMHG | HEIGHT: 64 IN | OXYGEN SATURATION: 96 % | RESPIRATION RATE: 20 BRPM

## 2025-05-12 DIAGNOSIS — J06.9 URI WITH COUGH AND CONGESTION: ICD-10-CM

## 2025-05-12 PROCEDURE — 3074F SYST BP LT 130 MM HG: CPT | Performed by: PHYSICIAN ASSISTANT

## 2025-05-12 PROCEDURE — 99213 OFFICE O/P EST LOW 20 MIN: CPT | Performed by: PHYSICIAN ASSISTANT

## 2025-05-12 PROCEDURE — 3078F DIAST BP <80 MM HG: CPT | Performed by: PHYSICIAN ASSISTANT

## 2025-05-12 RX ORDER — BENZONATATE 200 MG/1
200 CAPSULE ORAL 3 TIMES DAILY PRN
Qty: 60 CAPSULE | Refills: 0 | Status: SHIPPED | OUTPATIENT
Start: 2025-05-12

## 2025-05-12 ASSESSMENT — ENCOUNTER SYMPTOMS
COUGH: 1
FEVER: 0
CHILLS: 0
SHORTNESS OF BREATH: 0

## 2025-05-12 ASSESSMENT — FIBROSIS 4 INDEX: FIB4 SCORE: 1.27

## 2025-05-12 NOTE — ASSESSMENT & PLAN NOTE
Acute, uncontrolled.  Symptom onset, 1 week ago  + Cough, sneezing, runny nose, coughing up phlegm  Clear drainage with a some green  Seems clearer later in the day  Denies fever, chills, body aches, SOB  Coughing at night, making sleep difficult

## 2025-05-12 NOTE — PROGRESS NOTES
"SUBJECTIVE:     CC:     HPI:   Gerri, patient of Dr. Natacha Murillo, presents today for an acute visit with the following:    ASSESSMENT & PLAN by Problem:     Problem   URI With Cough and Congestion    Acute, uncontrolled but stable.  Vitals and exam are unremarkable.  Low suspicion for pneumonia.  Discussed likely viral etiology  Recommend chlorpheniramine tablets, fluticasone nasal spray, lots of fluids.  Tessalon Perles as needed for cough.  Follow-up for worsening symptoms.         Return if symptoms worsen or fail to improve.         HPI:     Problem List Items Addressed This Visit       URI with cough and congestion    Acute, uncontrolled.  Symptom onset, 1 week ago  + Cough, sneezing, runny nose, coughing up phlegm  Clear drainage with a some green  Seems clearer later in the day  Denies fever, chills, body aches, SOB  Coughing at night, making sleep difficult         Relevant Medications    benzonatate (TESSALON) 200 MG capsule              ROS:  Review of Systems   Constitutional:  Negative for chills and fever.   Respiratory:  Positive for cough. Negative for shortness of breath.    Cardiovascular:  Negative for chest pain.       OBJECTIVE:     Exam:  /58 (BP Location: Right arm, Patient Position: Sitting, BP Cuff Size: Adult)   Pulse 62   Temp 37.3 °C (99.2 °F) (Temporal)   Resp 20   Ht 1.626 m (5' 4\")   Wt 72.7 kg (160 lb 6 oz)   LMP 10/30/2004   SpO2 96%   Breastfeeding No   BMI 27.53 kg/m²  Body mass index is 27.53 kg/m².    Physical Exam  Constitutional:       General: She is not in acute distress.     Appearance: Normal appearance. She is not toxic-appearing.   HENT:      Right Ear: Tympanic membrane, ear canal and external ear normal.      Left Ear: Tympanic membrane, ear canal and external ear normal.      Mouth/Throat:      Mouth: Mucous membranes are moist.      Pharynx: Oropharynx is clear. No oropharyngeal exudate or posterior oropharyngeal erythema.   Eyes:      " Conjunctiva/sclera: Conjunctivae normal.   Cardiovascular:      Rate and Rhythm: Normal rate and regular rhythm.      Heart sounds: No murmur heard.  Pulmonary:      Effort: Pulmonary effort is normal. No respiratory distress.      Breath sounds: No wheezing, rhonchi or rales.   Skin:     General: Skin is warm.   Neurological:      General: No focal deficit present.      Mental Status: She is alert.           Please note that this dictation was created using voice recognition software. I have made every reasonable attempt to correct obvious errors, but I expect that there are errors of grammar and possibly content that I did not discover before finalizing the note.

## 2025-05-26 DIAGNOSIS — J01.00 ACUTE NON-RECURRENT MAXILLARY SINUSITIS: Primary | ICD-10-CM

## 2025-05-26 RX ORDER — AZITHROMYCIN 250 MG/1
TABLET, FILM COATED ORAL
Qty: 6 TABLET | Refills: 0 | Status: SHIPPED | OUTPATIENT
Start: 2025-05-26

## 2025-07-25 ENCOUNTER — PATIENT MESSAGE (OUTPATIENT)
Dept: MEDICAL GROUP | Facility: MEDICAL CENTER | Age: 70
End: 2025-07-25
Payer: MEDICARE

## 2025-07-25 DIAGNOSIS — B00.1 COLD SORE: ICD-10-CM

## 2025-07-25 RX ORDER — VALACYCLOVIR HYDROCHLORIDE 1 G/1
1000 TABLET, FILM COATED ORAL 2 TIMES DAILY
Qty: 20 TABLET | Refills: 3 | Status: SHIPPED | OUTPATIENT
Start: 2025-07-25

## 2025-07-30 ENCOUNTER — APPOINTMENT (OUTPATIENT)
Dept: URBAN - METROPOLITAN AREA CLINIC 22 | Facility: CLINIC | Age: 70
Setting detail: DERMATOLOGY
End: 2025-07-30

## 2025-07-30 DIAGNOSIS — L81.4 OTHER MELANIN HYPERPIGMENTATION: ICD-10-CM

## 2025-07-30 DIAGNOSIS — D22 MELANOCYTIC NEVI: ICD-10-CM

## 2025-07-30 DIAGNOSIS — Z85.820 PERSONAL HISTORY OF MALIGNANT MELANOMA OF SKIN: ICD-10-CM

## 2025-07-30 DIAGNOSIS — L82.0 INFLAMED SEBORRHEIC KERATOSIS: ICD-10-CM

## 2025-07-30 DIAGNOSIS — L82.1 OTHER SEBORRHEIC KERATOSIS: ICD-10-CM

## 2025-07-30 DIAGNOSIS — Z85.828 PERSONAL HISTORY OF OTHER MALIGNANT NEOPLASM OF SKIN: ICD-10-CM

## 2025-07-30 DIAGNOSIS — D18.0 HEMANGIOMA: ICD-10-CM

## 2025-07-30 PROBLEM — D48.5 NEOPLASM OF UNCERTAIN BEHAVIOR OF SKIN: Status: ACTIVE | Noted: 2025-07-30

## 2025-07-30 PROBLEM — D22.5 MELANOCYTIC NEVI OF TRUNK: Status: ACTIVE | Noted: 2025-07-30

## 2025-07-30 PROBLEM — D18.01 HEMANGIOMA OF SKIN AND SUBCUTANEOUS TISSUE: Status: ACTIVE | Noted: 2025-07-30

## 2025-07-30 PROCEDURE — ? BIOPSY BY SHAVE METHOD

## 2025-07-30 PROCEDURE — ? SUNSCREEN RECOMMENDATIONS

## 2025-07-30 PROCEDURE — ? COUNSELING

## 2025-07-30 ASSESSMENT — LOCATION SIMPLE DESCRIPTION DERM
LOCATION SIMPLE: LEFT HAND
LOCATION SIMPLE: CHEST
LOCATION SIMPLE: ABDOMEN
LOCATION SIMPLE: RIGHT UPPER BACK
LOCATION SIMPLE: RIGHT FOREARM
LOCATION SIMPLE: LEFT FOREARM
LOCATION SIMPLE: LEFT NOSE
LOCATION SIMPLE: INFERIOR FOREHEAD
LOCATION SIMPLE: UPPER BACK
LOCATION SIMPLE: RIGHT UPPER ARM
LOCATION SIMPLE: LEFT SHOULDER
LOCATION SIMPLE: LOWER BACK
LOCATION SIMPLE: RIGHT HAND
LOCATION SIMPLE: RIGHT SHOULDER

## 2025-07-30 ASSESSMENT — LOCATION DETAILED DESCRIPTION DERM
LOCATION DETAILED: RIGHT POSTERIOR SHOULDER
LOCATION DETAILED: LEFT VENTRAL PROXIMAL FOREARM
LOCATION DETAILED: RIGHT RADIAL DORSAL HAND
LOCATION DETAILED: INFERIOR THORACIC SPINE
LOCATION DETAILED: SUPERIOR LUMBAR SPINE
LOCATION DETAILED: LEFT RADIAL DORSAL HAND
LOCATION DETAILED: LOWER STERNUM
LOCATION DETAILED: RIGHT VENTRAL PROXIMAL FOREARM
LOCATION DETAILED: UPPER STERNUM
LOCATION DETAILED: RIGHT MEDIAL SUPERIOR CHEST
LOCATION DETAILED: SUPERIOR THORACIC SPINE
LOCATION DETAILED: LEFT POSTERIOR SHOULDER
LOCATION DETAILED: LEFT NARIS
LOCATION DETAILED: INFERIOR MID FOREHEAD
LOCATION DETAILED: EPIGASTRIC SKIN
LOCATION DETAILED: LEFT SUPERIOR FLANK
LOCATION DETAILED: RIGHT LATERAL PROXIMAL UPPER ARM
LOCATION DETAILED: RIGHT SUPERIOR MEDIAL UPPER BACK
LOCATION DETAILED: RIGHT MEDIAL UPPER BACK
LOCATION DETAILED: PERIUMBILICAL SKIN

## 2025-07-30 ASSESSMENT — LOCATION ZONE DERM
LOCATION ZONE: HAND
LOCATION ZONE: ARM
LOCATION ZONE: NOSE
LOCATION ZONE: FACE
LOCATION ZONE: TRUNK

## 2025-08-29 DIAGNOSIS — E03.9 PRIMARY HYPOTHYROIDISM: ICD-10-CM

## 2025-08-29 RX ORDER — LEVOTHYROXINE SODIUM 75 MCG
75 TABLET ORAL
Qty: 90 TABLET | Refills: 2 | Status: SHIPPED | OUTPATIENT
Start: 2025-08-29